# Patient Record
Sex: MALE
[De-identification: names, ages, dates, MRNs, and addresses within clinical notes are randomized per-mention and may not be internally consistent; named-entity substitution may affect disease eponyms.]

---

## 2018-02-22 ENCOUNTER — HOSPITAL ENCOUNTER (OUTPATIENT)
Dept: HOSPITAL 90 - WHH | Age: 58
Discharge: HOME | End: 2018-02-22
Attending: FAMILY MEDICINE
Payer: COMMERCIAL

## 2018-02-22 VITALS — DIASTOLIC BLOOD PRESSURE: 95 MMHG | SYSTOLIC BLOOD PRESSURE: 152 MMHG

## 2018-02-22 DIAGNOSIS — E11.621: Primary | ICD-10-CM

## 2018-02-22 DIAGNOSIS — L97.521: ICD-10-CM

## 2018-02-22 DIAGNOSIS — E11.51: ICD-10-CM

## 2018-02-22 DIAGNOSIS — E78.2: ICD-10-CM

## 2018-02-22 PROCEDURE — 99211 OFF/OP EST MAY X REQ PHY/QHP: CPT

## 2018-02-22 PROCEDURE — 93923 UPR/LXTR ART STDY 3+ LVLS: CPT

## 2018-02-26 ENCOUNTER — HOSPITAL ENCOUNTER (OUTPATIENT)
Dept: HOSPITAL 90 - WHH | Age: 58
Discharge: HOME | End: 2018-02-26
Attending: FAMILY MEDICINE
Payer: COMMERCIAL

## 2018-02-26 ENCOUNTER — HOSPITAL ENCOUNTER (OUTPATIENT)
Dept: HOSPITAL 90 - RAH | Age: 58
Discharge: HOME | End: 2018-02-26
Attending: FAMILY MEDICINE
Payer: COMMERCIAL

## 2018-02-26 VITALS — SYSTOLIC BLOOD PRESSURE: 161 MMHG | DIASTOLIC BLOOD PRESSURE: 105 MMHG

## 2018-02-26 DIAGNOSIS — L97.529: ICD-10-CM

## 2018-02-26 DIAGNOSIS — I73.9: ICD-10-CM

## 2018-02-26 DIAGNOSIS — E11.51: ICD-10-CM

## 2018-02-26 DIAGNOSIS — E11.621: Primary | ICD-10-CM

## 2018-02-26 DIAGNOSIS — L97.521: ICD-10-CM

## 2018-02-26 DIAGNOSIS — E78.2: ICD-10-CM

## 2018-02-26 PROCEDURE — 99211 OFF/OP EST MAY X REQ PHY/QHP: CPT

## 2018-02-26 PROCEDURE — 78315 BONE IMAGING 3 PHASE: CPT

## 2018-02-26 PROCEDURE — 93922 UPR/L XTREMITY ART 2 LEVELS: CPT

## 2018-02-27 ENCOUNTER — HOSPITAL ENCOUNTER (OUTPATIENT)
Dept: HOSPITAL 90 - WHH | Age: 58
Discharge: HOME | End: 2018-02-27
Attending: FAMILY MEDICINE
Payer: COMMERCIAL

## 2018-02-27 ENCOUNTER — HOSPITAL ENCOUNTER (OUTPATIENT)
Dept: HOSPITAL 90 - RAH | Age: 58
Discharge: HOME | End: 2018-02-27
Attending: FAMILY MEDICINE
Payer: COMMERCIAL

## 2018-02-27 VITALS — SYSTOLIC BLOOD PRESSURE: 156 MMHG | DIASTOLIC BLOOD PRESSURE: 80 MMHG

## 2018-02-27 DIAGNOSIS — Z86.73: ICD-10-CM

## 2018-02-27 DIAGNOSIS — I10: ICD-10-CM

## 2018-02-27 DIAGNOSIS — Z95.1: ICD-10-CM

## 2018-02-27 DIAGNOSIS — L97.529: ICD-10-CM

## 2018-02-27 DIAGNOSIS — E11.69: ICD-10-CM

## 2018-02-27 DIAGNOSIS — E78.2: ICD-10-CM

## 2018-02-27 DIAGNOSIS — Z85.89: ICD-10-CM

## 2018-02-27 DIAGNOSIS — E11.621: Primary | ICD-10-CM

## 2018-02-27 DIAGNOSIS — I25.10: ICD-10-CM

## 2018-02-27 DIAGNOSIS — M79.89: ICD-10-CM

## 2018-02-27 DIAGNOSIS — F41.9: ICD-10-CM

## 2018-02-27 DIAGNOSIS — E11.40: ICD-10-CM

## 2018-02-27 DIAGNOSIS — E11.51: ICD-10-CM

## 2018-02-27 DIAGNOSIS — M86.8X7: ICD-10-CM

## 2018-02-27 PROCEDURE — 11042 DBRDMT SUBQ TIS 1ST 20SQCM/<: CPT

## 2018-02-27 PROCEDURE — 73630 X-RAY EXAM OF FOOT: CPT

## 2018-03-01 ENCOUNTER — HOSPITAL ENCOUNTER (OUTPATIENT)
Dept: HOSPITAL 90 - WHH | Age: 58
Discharge: HOME | End: 2018-03-01
Attending: FAMILY MEDICINE
Payer: COMMERCIAL

## 2018-03-01 DIAGNOSIS — E11.621: Primary | ICD-10-CM

## 2018-03-01 DIAGNOSIS — Z85.89: ICD-10-CM

## 2018-03-01 DIAGNOSIS — L97.529: ICD-10-CM

## 2018-03-01 DIAGNOSIS — I25.10: ICD-10-CM

## 2018-03-01 DIAGNOSIS — I10: ICD-10-CM

## 2018-03-01 DIAGNOSIS — E11.40: ICD-10-CM

## 2018-03-01 DIAGNOSIS — M79.89: ICD-10-CM

## 2018-03-01 DIAGNOSIS — E78.2: ICD-10-CM

## 2018-03-01 DIAGNOSIS — Z95.1: ICD-10-CM

## 2018-03-01 DIAGNOSIS — F41.9: ICD-10-CM

## 2018-03-01 DIAGNOSIS — M86.8X7: ICD-10-CM

## 2018-03-01 DIAGNOSIS — E11.51: ICD-10-CM

## 2018-03-01 DIAGNOSIS — Z86.73: ICD-10-CM

## 2018-03-01 DIAGNOSIS — E11.69: ICD-10-CM

## 2018-03-01 LAB
BUN SERPL-MCNC: 22 MG/DL (ref 7–18)
CHLORIDE SERPL-SCNC: 103 MMOL/L (ref 101–111)
CO2 SERPL-SCNC: 31 MMOL/L (ref 21–32)
CREAT SERPL-MCNC: 1.1 MG/DL (ref 0.5–1.5)
GFR SERPL CREATININE-BSD FRML MDRD: 74 ML/MIN (ref 60–?)
GLUCOSE SERPL-MCNC: 265 MG/DL (ref 70–105)
POTASSIUM SERPL-SCNC: 5 MMOL/L (ref 3.5–5.1)
SODIUM SERPL-SCNC: 140 MMOL/L (ref 136–145)

## 2018-03-01 PROCEDURE — 80048 BASIC METABOLIC PNL TOTAL CA: CPT

## 2018-03-01 PROCEDURE — 99211 OFF/OP EST MAY X REQ PHY/QHP: CPT

## 2018-03-01 PROCEDURE — 36415 COLL VENOUS BLD VENIPUNCTURE: CPT

## 2018-03-01 PROCEDURE — 93923 UPR/LXTR ART STDY 3+ LVLS: CPT

## 2018-03-06 ENCOUNTER — HOSPITAL ENCOUNTER (OUTPATIENT)
Dept: HOSPITAL 90 - WHH | Age: 58
Discharge: HOME | End: 2018-03-06
Attending: FAMILY MEDICINE
Payer: COMMERCIAL

## 2018-03-06 VITALS — DIASTOLIC BLOOD PRESSURE: 89 MMHG | SYSTOLIC BLOOD PRESSURE: 157 MMHG

## 2018-03-06 DIAGNOSIS — Z86.73: ICD-10-CM

## 2018-03-06 DIAGNOSIS — E11.621: Primary | ICD-10-CM

## 2018-03-06 DIAGNOSIS — E11.69: ICD-10-CM

## 2018-03-06 DIAGNOSIS — Z95.1: ICD-10-CM

## 2018-03-06 DIAGNOSIS — M79.89: ICD-10-CM

## 2018-03-06 DIAGNOSIS — Z85.89: ICD-10-CM

## 2018-03-06 DIAGNOSIS — I10: ICD-10-CM

## 2018-03-06 DIAGNOSIS — E11.51: ICD-10-CM

## 2018-03-06 DIAGNOSIS — I25.10: ICD-10-CM

## 2018-03-06 DIAGNOSIS — E11.40: ICD-10-CM

## 2018-03-06 DIAGNOSIS — L97.521: ICD-10-CM

## 2018-03-06 DIAGNOSIS — E78.2: ICD-10-CM

## 2018-03-06 DIAGNOSIS — F41.9: ICD-10-CM

## 2018-03-06 DIAGNOSIS — M86.8X7: ICD-10-CM

## 2018-03-06 LAB
BUN SERPL-MCNC: 28 MG/DL (ref 7–18)
CHLORIDE SERPL-SCNC: 103 MMOL/L (ref 101–111)
CO2 SERPL-SCNC: 29 MMOL/L (ref 21–32)
CREAT SERPL-MCNC: 1.2 MG/DL (ref 0.5–1.5)
GFR SERPL CREATININE-BSD FRML MDRD: 67 ML/MIN (ref 60–?)
GLUCOSE SERPL-MCNC: 248 MG/DL (ref 70–105)
POTASSIUM SERPL-SCNC: 4.7 MMOL/L (ref 3.5–5.1)
SODIUM SERPL-SCNC: 138 MMOL/L (ref 136–145)

## 2018-03-06 PROCEDURE — 80048 BASIC METABOLIC PNL TOTAL CA: CPT

## 2018-03-06 PROCEDURE — 11042 DBRDMT SUBQ TIS 1ST 20SQCM/<: CPT

## 2018-03-06 PROCEDURE — 36415 COLL VENOUS BLD VENIPUNCTURE: CPT

## 2018-03-06 PROCEDURE — 82948 REAGENT STRIP/BLOOD GLUCOSE: CPT

## 2018-03-12 ENCOUNTER — HOSPITAL ENCOUNTER (OUTPATIENT)
Dept: HOSPITAL 90 - WHH | Age: 58
Discharge: HOME | End: 2018-03-12
Attending: FAMILY MEDICINE
Payer: COMMERCIAL

## 2018-03-12 VITALS — SYSTOLIC BLOOD PRESSURE: 170 MMHG | DIASTOLIC BLOOD PRESSURE: 97 MMHG

## 2018-03-12 DIAGNOSIS — E11.621: Primary | ICD-10-CM

## 2018-03-12 DIAGNOSIS — L97.521: ICD-10-CM

## 2018-03-12 DIAGNOSIS — E11.51: ICD-10-CM

## 2018-03-12 DIAGNOSIS — I10: ICD-10-CM

## 2018-03-12 DIAGNOSIS — E11.40: ICD-10-CM

## 2018-03-12 DIAGNOSIS — M86.672: ICD-10-CM

## 2018-03-12 DIAGNOSIS — F41.9: ICD-10-CM

## 2018-03-12 DIAGNOSIS — Z86.73: ICD-10-CM

## 2018-03-12 DIAGNOSIS — I25.10: ICD-10-CM

## 2018-03-12 DIAGNOSIS — E78.2: ICD-10-CM

## 2018-03-12 DIAGNOSIS — Z95.1: ICD-10-CM

## 2018-03-12 DIAGNOSIS — E11.69: ICD-10-CM

## 2018-03-12 PROCEDURE — 82948 REAGENT STRIP/BLOOD GLUCOSE: CPT

## 2018-03-13 ENCOUNTER — HOSPITAL ENCOUNTER (OUTPATIENT)
Dept: HOSPITAL 90 - WHH | Age: 58
Discharge: HOME | End: 2018-03-13
Attending: FAMILY MEDICINE
Payer: COMMERCIAL

## 2018-03-13 VITALS — SYSTOLIC BLOOD PRESSURE: 141 MMHG | DIASTOLIC BLOOD PRESSURE: 85 MMHG

## 2018-03-13 VITALS — SYSTOLIC BLOOD PRESSURE: 152 MMHG | DIASTOLIC BLOOD PRESSURE: 91 MMHG

## 2018-03-13 DIAGNOSIS — I10: ICD-10-CM

## 2018-03-13 DIAGNOSIS — E11.51: ICD-10-CM

## 2018-03-13 DIAGNOSIS — F41.9: ICD-10-CM

## 2018-03-13 DIAGNOSIS — Z86.73: ICD-10-CM

## 2018-03-13 DIAGNOSIS — E11.621: Primary | ICD-10-CM

## 2018-03-13 DIAGNOSIS — L97.521: ICD-10-CM

## 2018-03-13 DIAGNOSIS — Z95.1: ICD-10-CM

## 2018-03-13 DIAGNOSIS — E11.69: ICD-10-CM

## 2018-03-13 DIAGNOSIS — E11.40: ICD-10-CM

## 2018-03-13 DIAGNOSIS — I25.10: ICD-10-CM

## 2018-03-13 DIAGNOSIS — M86.672: ICD-10-CM

## 2018-03-13 DIAGNOSIS — E78.2: ICD-10-CM

## 2018-03-13 LAB
BUN SERPL-MCNC: 30 MG/DL (ref 7–18)
CHLORIDE SERPL-SCNC: 103 MMOL/L (ref 101–111)
CO2 SERPL-SCNC: 31 MMOL/L (ref 21–32)
CREAT SERPL-MCNC: 1.5 MG/DL (ref 0.5–1.5)
GFR SERPL CREATININE-BSD FRML MDRD: 51 ML/MIN (ref 60–?)
GLUCOSE SERPL-MCNC: 228 MG/DL (ref 70–105)
POTASSIUM SERPL-SCNC: 5.7 MMOL/L (ref 3.5–5.1)
SODIUM SERPL-SCNC: 140 MMOL/L (ref 136–145)

## 2018-03-13 PROCEDURE — 11042 DBRDMT SUBQ TIS 1ST 20SQCM/<: CPT

## 2018-03-13 PROCEDURE — 36415 COLL VENOUS BLD VENIPUNCTURE: CPT

## 2018-03-13 PROCEDURE — 82948 REAGENT STRIP/BLOOD GLUCOSE: CPT

## 2018-03-13 PROCEDURE — 80048 BASIC METABOLIC PNL TOTAL CA: CPT

## 2018-03-14 ENCOUNTER — HOSPITAL ENCOUNTER (OUTPATIENT)
Dept: HOSPITAL 90 - WHH | Age: 58
Discharge: HOME | End: 2018-03-14
Attending: FAMILY MEDICINE
Payer: COMMERCIAL

## 2018-03-14 VITALS — DIASTOLIC BLOOD PRESSURE: 87 MMHG | SYSTOLIC BLOOD PRESSURE: 153 MMHG

## 2018-03-14 DIAGNOSIS — M86.8X7: ICD-10-CM

## 2018-03-14 DIAGNOSIS — E11.621: Primary | ICD-10-CM

## 2018-03-14 DIAGNOSIS — E11.40: ICD-10-CM

## 2018-03-14 DIAGNOSIS — E11.51: ICD-10-CM

## 2018-03-14 DIAGNOSIS — M86.672: ICD-10-CM

## 2018-03-14 DIAGNOSIS — L97.521: ICD-10-CM

## 2018-03-14 DIAGNOSIS — Z95.1: ICD-10-CM

## 2018-03-14 DIAGNOSIS — I25.10: ICD-10-CM

## 2018-03-14 DIAGNOSIS — F41.9: ICD-10-CM

## 2018-03-14 DIAGNOSIS — E11.69: ICD-10-CM

## 2018-03-14 DIAGNOSIS — E78.2: ICD-10-CM

## 2018-03-14 DIAGNOSIS — Z86.73: ICD-10-CM

## 2018-03-14 PROCEDURE — 82948 REAGENT STRIP/BLOOD GLUCOSE: CPT

## 2018-03-15 ENCOUNTER — HOSPITAL ENCOUNTER (OUTPATIENT)
Dept: HOSPITAL 90 - WHH | Age: 58
Discharge: HOME | End: 2018-03-15
Attending: FAMILY MEDICINE
Payer: COMMERCIAL

## 2018-03-15 VITALS — DIASTOLIC BLOOD PRESSURE: 77 MMHG | SYSTOLIC BLOOD PRESSURE: 132 MMHG

## 2018-03-15 VITALS — SYSTOLIC BLOOD PRESSURE: 137 MMHG | DIASTOLIC BLOOD PRESSURE: 77 MMHG

## 2018-03-15 DIAGNOSIS — Z95.1: ICD-10-CM

## 2018-03-15 DIAGNOSIS — E11.621: Primary | ICD-10-CM

## 2018-03-15 DIAGNOSIS — E11.40: ICD-10-CM

## 2018-03-15 DIAGNOSIS — Z86.73: ICD-10-CM

## 2018-03-15 DIAGNOSIS — L97.521: ICD-10-CM

## 2018-03-15 DIAGNOSIS — E78.2: ICD-10-CM

## 2018-03-15 DIAGNOSIS — F41.9: ICD-10-CM

## 2018-03-15 DIAGNOSIS — E11.69: ICD-10-CM

## 2018-03-15 DIAGNOSIS — I25.10: ICD-10-CM

## 2018-03-15 DIAGNOSIS — M86.672: ICD-10-CM

## 2018-03-15 DIAGNOSIS — E11.51: ICD-10-CM

## 2018-03-15 DIAGNOSIS — I10: ICD-10-CM

## 2018-03-15 PROCEDURE — 82948 REAGENT STRIP/BLOOD GLUCOSE: CPT

## 2018-03-20 ENCOUNTER — HOSPITAL ENCOUNTER (OUTPATIENT)
Dept: HOSPITAL 90 - WHH | Age: 58
Discharge: HOME | End: 2018-03-20
Attending: FAMILY MEDICINE
Payer: COMMERCIAL

## 2018-03-20 VITALS — SYSTOLIC BLOOD PRESSURE: 136 MMHG | DIASTOLIC BLOOD PRESSURE: 77 MMHG

## 2018-03-20 VITALS — SYSTOLIC BLOOD PRESSURE: 90 MMHG | DIASTOLIC BLOOD PRESSURE: 52 MMHG

## 2018-03-20 DIAGNOSIS — I25.10: ICD-10-CM

## 2018-03-20 DIAGNOSIS — F41.9: ICD-10-CM

## 2018-03-20 DIAGNOSIS — E11.69: ICD-10-CM

## 2018-03-20 DIAGNOSIS — I10: ICD-10-CM

## 2018-03-20 DIAGNOSIS — Z95.1: ICD-10-CM

## 2018-03-20 DIAGNOSIS — E11.40: ICD-10-CM

## 2018-03-20 DIAGNOSIS — Z86.73: ICD-10-CM

## 2018-03-20 DIAGNOSIS — E78.2: ICD-10-CM

## 2018-03-20 DIAGNOSIS — M86.672: ICD-10-CM

## 2018-03-20 DIAGNOSIS — L97.521: ICD-10-CM

## 2018-03-20 DIAGNOSIS — E11.51: ICD-10-CM

## 2018-03-20 DIAGNOSIS — E11.621: Primary | ICD-10-CM

## 2018-03-20 LAB
BUN SERPL-MCNC: 27 MG/DL (ref 7–18)
CHLORIDE SERPL-SCNC: 103 MMOL/L (ref 101–111)
CO2 SERPL-SCNC: 31 MMOL/L (ref 21–32)
CREAT SERPL-MCNC: 1.8 MG/DL (ref 0.5–1.5)
GFR SERPL CREATININE-BSD FRML MDRD: 42 ML/MIN (ref 60–?)
GLUCOSE SERPL-MCNC: 152 MG/DL (ref 70–105)
POTASSIUM SERPL-SCNC: 4.3 MMOL/L (ref 3.5–5.1)
SODIUM SERPL-SCNC: 138 MMOL/L (ref 136–145)

## 2018-03-20 PROCEDURE — 36415 COLL VENOUS BLD VENIPUNCTURE: CPT

## 2018-03-20 PROCEDURE — 11042 DBRDMT SUBQ TIS 1ST 20SQCM/<: CPT

## 2018-03-20 PROCEDURE — 80048 BASIC METABOLIC PNL TOTAL CA: CPT

## 2018-03-20 PROCEDURE — 82948 REAGENT STRIP/BLOOD GLUCOSE: CPT

## 2018-03-21 ENCOUNTER — HOSPITAL ENCOUNTER (OUTPATIENT)
Dept: HOSPITAL 90 - WHH | Age: 58
Discharge: HOME | End: 2018-03-21
Attending: FAMILY MEDICINE
Payer: COMMERCIAL

## 2018-03-21 VITALS — DIASTOLIC BLOOD PRESSURE: 88 MMHG | SYSTOLIC BLOOD PRESSURE: 140 MMHG

## 2018-03-21 VITALS — SYSTOLIC BLOOD PRESSURE: 141 MMHG | DIASTOLIC BLOOD PRESSURE: 84 MMHG

## 2018-03-21 DIAGNOSIS — Z86.73: ICD-10-CM

## 2018-03-21 DIAGNOSIS — E78.2: ICD-10-CM

## 2018-03-21 DIAGNOSIS — Z85.89: ICD-10-CM

## 2018-03-21 DIAGNOSIS — Z95.1: ICD-10-CM

## 2018-03-21 DIAGNOSIS — E11.51: ICD-10-CM

## 2018-03-21 DIAGNOSIS — F41.9: ICD-10-CM

## 2018-03-21 DIAGNOSIS — E11.40: ICD-10-CM

## 2018-03-21 DIAGNOSIS — L97.521: ICD-10-CM

## 2018-03-21 DIAGNOSIS — I25.10: ICD-10-CM

## 2018-03-21 DIAGNOSIS — E11.69: ICD-10-CM

## 2018-03-21 DIAGNOSIS — E11.621: Primary | ICD-10-CM

## 2018-03-21 DIAGNOSIS — M86.672: ICD-10-CM

## 2018-03-21 DIAGNOSIS — I10: ICD-10-CM

## 2018-03-21 PROCEDURE — 82948 REAGENT STRIP/BLOOD GLUCOSE: CPT

## 2018-03-22 ENCOUNTER — HOSPITAL ENCOUNTER (OUTPATIENT)
Dept: HOSPITAL 90 - WHH | Age: 58
Discharge: HOME | End: 2018-03-22
Attending: FAMILY MEDICINE
Payer: COMMERCIAL

## 2018-03-22 VITALS — SYSTOLIC BLOOD PRESSURE: 142 MMHG | DIASTOLIC BLOOD PRESSURE: 91 MMHG

## 2018-03-22 VITALS — DIASTOLIC BLOOD PRESSURE: 87 MMHG | SYSTOLIC BLOOD PRESSURE: 159 MMHG

## 2018-03-22 DIAGNOSIS — L97.521: ICD-10-CM

## 2018-03-22 DIAGNOSIS — Z86.73: ICD-10-CM

## 2018-03-22 DIAGNOSIS — I10: ICD-10-CM

## 2018-03-22 DIAGNOSIS — F41.9: ICD-10-CM

## 2018-03-22 DIAGNOSIS — I25.10: ICD-10-CM

## 2018-03-22 DIAGNOSIS — E11.621: Primary | ICD-10-CM

## 2018-03-22 DIAGNOSIS — Z95.1: ICD-10-CM

## 2018-03-22 DIAGNOSIS — E11.69: ICD-10-CM

## 2018-03-22 DIAGNOSIS — E11.40: ICD-10-CM

## 2018-03-22 DIAGNOSIS — E11.51: ICD-10-CM

## 2018-03-22 DIAGNOSIS — E78.2: ICD-10-CM

## 2018-03-22 DIAGNOSIS — M86.672: ICD-10-CM

## 2018-03-22 DIAGNOSIS — Z85.89: ICD-10-CM

## 2018-03-22 PROCEDURE — 82948 REAGENT STRIP/BLOOD GLUCOSE: CPT

## 2018-03-23 ENCOUNTER — HOSPITAL ENCOUNTER (OUTPATIENT)
Dept: HOSPITAL 90 - WHH | Age: 58
Discharge: HOME | End: 2018-03-23
Attending: FAMILY MEDICINE
Payer: COMMERCIAL

## 2018-03-23 VITALS — SYSTOLIC BLOOD PRESSURE: 133 MMHG | DIASTOLIC BLOOD PRESSURE: 82 MMHG

## 2018-03-23 VITALS — SYSTOLIC BLOOD PRESSURE: 156 MMHG | DIASTOLIC BLOOD PRESSURE: 92 MMHG

## 2018-03-23 DIAGNOSIS — M86.672: ICD-10-CM

## 2018-03-23 DIAGNOSIS — I10: ICD-10-CM

## 2018-03-23 DIAGNOSIS — E11.40: ICD-10-CM

## 2018-03-23 DIAGNOSIS — I25.10: ICD-10-CM

## 2018-03-23 DIAGNOSIS — Z85.89: ICD-10-CM

## 2018-03-23 DIAGNOSIS — E78.2: ICD-10-CM

## 2018-03-23 DIAGNOSIS — E11.51: ICD-10-CM

## 2018-03-23 DIAGNOSIS — F41.9: ICD-10-CM

## 2018-03-23 DIAGNOSIS — Z95.1: ICD-10-CM

## 2018-03-23 DIAGNOSIS — E11.621: Primary | ICD-10-CM

## 2018-03-23 DIAGNOSIS — L97.521: ICD-10-CM

## 2018-03-23 DIAGNOSIS — E11.69: ICD-10-CM

## 2018-03-23 DIAGNOSIS — Z86.73: ICD-10-CM

## 2018-03-23 PROCEDURE — 82948 REAGENT STRIP/BLOOD GLUCOSE: CPT

## 2018-03-26 ENCOUNTER — HOSPITAL ENCOUNTER (OUTPATIENT)
Dept: HOSPITAL 90 - WHH | Age: 58
Discharge: HOME | End: 2018-03-26
Attending: FAMILY MEDICINE
Payer: COMMERCIAL

## 2018-03-26 VITALS — DIASTOLIC BLOOD PRESSURE: 99 MMHG | SYSTOLIC BLOOD PRESSURE: 158 MMHG

## 2018-03-26 VITALS — DIASTOLIC BLOOD PRESSURE: 83 MMHG | SYSTOLIC BLOOD PRESSURE: 153 MMHG

## 2018-03-26 DIAGNOSIS — I10: ICD-10-CM

## 2018-03-26 DIAGNOSIS — Z86.73: ICD-10-CM

## 2018-03-26 DIAGNOSIS — E11.69: ICD-10-CM

## 2018-03-26 DIAGNOSIS — L97.521: ICD-10-CM

## 2018-03-26 DIAGNOSIS — I25.10: ICD-10-CM

## 2018-03-26 DIAGNOSIS — M86.672: ICD-10-CM

## 2018-03-26 DIAGNOSIS — E11.621: Primary | ICD-10-CM

## 2018-03-26 DIAGNOSIS — Z85.89: ICD-10-CM

## 2018-03-26 DIAGNOSIS — Z95.1: ICD-10-CM

## 2018-03-26 DIAGNOSIS — E11.51: ICD-10-CM

## 2018-03-26 DIAGNOSIS — E78.2: ICD-10-CM

## 2018-03-26 DIAGNOSIS — F41.9: ICD-10-CM

## 2018-03-26 DIAGNOSIS — E11.40: ICD-10-CM

## 2018-03-26 PROCEDURE — 82948 REAGENT STRIP/BLOOD GLUCOSE: CPT

## 2018-03-27 ENCOUNTER — HOSPITAL ENCOUNTER (OUTPATIENT)
Dept: HOSPITAL 90 - WHH | Age: 58
Discharge: HOME | End: 2018-03-27
Attending: FAMILY MEDICINE
Payer: COMMERCIAL

## 2018-03-27 VITALS — SYSTOLIC BLOOD PRESSURE: 142 MMHG | DIASTOLIC BLOOD PRESSURE: 84 MMHG

## 2018-03-27 VITALS — SYSTOLIC BLOOD PRESSURE: 131 MMHG | DIASTOLIC BLOOD PRESSURE: 69 MMHG

## 2018-03-27 DIAGNOSIS — I25.10: ICD-10-CM

## 2018-03-27 DIAGNOSIS — E11.40: ICD-10-CM

## 2018-03-27 DIAGNOSIS — L97.521: ICD-10-CM

## 2018-03-27 DIAGNOSIS — E11.621: Primary | ICD-10-CM

## 2018-03-27 DIAGNOSIS — Z85.89: ICD-10-CM

## 2018-03-27 DIAGNOSIS — Z86.73: ICD-10-CM

## 2018-03-27 DIAGNOSIS — E11.69: ICD-10-CM

## 2018-03-27 DIAGNOSIS — E11.51: ICD-10-CM

## 2018-03-27 DIAGNOSIS — M86.672: ICD-10-CM

## 2018-03-27 DIAGNOSIS — F41.9: ICD-10-CM

## 2018-03-27 DIAGNOSIS — Z95.1: ICD-10-CM

## 2018-03-27 DIAGNOSIS — L84: ICD-10-CM

## 2018-03-27 DIAGNOSIS — I10: ICD-10-CM

## 2018-03-27 DIAGNOSIS — E78.2: ICD-10-CM

## 2018-03-27 LAB
BUN SERPL-MCNC: 28 MG/DL (ref 7–18)
CHLORIDE SERPL-SCNC: 100 MMOL/L (ref 101–111)
CO2 SERPL-SCNC: 30 MMOL/L (ref 21–32)
CREAT SERPL-MCNC: 1.4 MG/DL (ref 0.5–1.5)
GFR SERPL CREATININE-BSD FRML MDRD: 56 ML/MIN (ref 60–?)
GLUCOSE SERPL-MCNC: 309 MG/DL (ref 70–105)
POTASSIUM SERPL-SCNC: 4.6 MMOL/L (ref 3.5–5.1)
SODIUM SERPL-SCNC: 136 MMOL/L (ref 136–145)

## 2018-03-27 PROCEDURE — 80048 BASIC METABOLIC PNL TOTAL CA: CPT

## 2018-03-27 PROCEDURE — 36415 COLL VENOUS BLD VENIPUNCTURE: CPT

## 2018-03-27 PROCEDURE — 82948 REAGENT STRIP/BLOOD GLUCOSE: CPT

## 2018-03-27 PROCEDURE — 11055 PARING/CUTG B9 HYPRKER LES 1: CPT

## 2018-03-28 ENCOUNTER — HOSPITAL ENCOUNTER (OUTPATIENT)
Dept: HOSPITAL 90 - WHH | Age: 58
Discharge: HOME | End: 2018-03-28
Attending: FAMILY MEDICINE
Payer: COMMERCIAL

## 2018-03-28 VITALS — DIASTOLIC BLOOD PRESSURE: 75 MMHG | SYSTOLIC BLOOD PRESSURE: 130 MMHG

## 2018-03-28 VITALS — DIASTOLIC BLOOD PRESSURE: 62 MMHG | SYSTOLIC BLOOD PRESSURE: 116 MMHG

## 2018-03-28 DIAGNOSIS — E11.51: ICD-10-CM

## 2018-03-28 DIAGNOSIS — F41.9: ICD-10-CM

## 2018-03-28 DIAGNOSIS — E78.2: ICD-10-CM

## 2018-03-28 DIAGNOSIS — I10: ICD-10-CM

## 2018-03-28 DIAGNOSIS — L97.521: ICD-10-CM

## 2018-03-28 DIAGNOSIS — Z95.1: ICD-10-CM

## 2018-03-28 DIAGNOSIS — Z85.89: ICD-10-CM

## 2018-03-28 DIAGNOSIS — Z86.73: ICD-10-CM

## 2018-03-28 DIAGNOSIS — E11.40: ICD-10-CM

## 2018-03-28 DIAGNOSIS — E11.69: ICD-10-CM

## 2018-03-28 DIAGNOSIS — E11.621: Primary | ICD-10-CM

## 2018-03-28 DIAGNOSIS — I25.10: ICD-10-CM

## 2018-03-28 DIAGNOSIS — M86.672: ICD-10-CM

## 2018-03-28 PROCEDURE — 82948 REAGENT STRIP/BLOOD GLUCOSE: CPT

## 2018-03-29 ENCOUNTER — HOSPITAL ENCOUNTER (OUTPATIENT)
Dept: HOSPITAL 90 - WHH | Age: 58
Discharge: HOME | End: 2018-03-29
Attending: FAMILY MEDICINE
Payer: COMMERCIAL

## 2018-03-29 VITALS — DIASTOLIC BLOOD PRESSURE: 80 MMHG | SYSTOLIC BLOOD PRESSURE: 157 MMHG

## 2018-03-29 DIAGNOSIS — E11.40: ICD-10-CM

## 2018-03-29 DIAGNOSIS — E11.621: Primary | ICD-10-CM

## 2018-03-29 DIAGNOSIS — Z85.89: ICD-10-CM

## 2018-03-29 DIAGNOSIS — I10: ICD-10-CM

## 2018-03-29 DIAGNOSIS — I25.10: ICD-10-CM

## 2018-03-29 DIAGNOSIS — Z95.1: ICD-10-CM

## 2018-03-29 DIAGNOSIS — L97.521: ICD-10-CM

## 2018-03-29 DIAGNOSIS — E11.69: ICD-10-CM

## 2018-03-29 DIAGNOSIS — E11.51: ICD-10-CM

## 2018-03-29 DIAGNOSIS — Z86.73: ICD-10-CM

## 2018-03-29 DIAGNOSIS — M86.672: ICD-10-CM

## 2018-03-29 DIAGNOSIS — E78.2: ICD-10-CM

## 2018-03-29 DIAGNOSIS — F41.9: ICD-10-CM

## 2018-03-29 PROCEDURE — 82948 REAGENT STRIP/BLOOD GLUCOSE: CPT

## 2018-04-04 ENCOUNTER — HOSPITAL ENCOUNTER (OUTPATIENT)
Dept: HOSPITAL 90 - WHH | Age: 58
Discharge: HOME | End: 2018-04-04
Attending: FAMILY MEDICINE
Payer: COMMERCIAL

## 2018-04-04 VITALS — DIASTOLIC BLOOD PRESSURE: 78 MMHG | SYSTOLIC BLOOD PRESSURE: 137 MMHG

## 2018-04-04 VITALS — SYSTOLIC BLOOD PRESSURE: 136 MMHG | DIASTOLIC BLOOD PRESSURE: 68 MMHG

## 2018-04-04 DIAGNOSIS — I25.10: ICD-10-CM

## 2018-04-04 DIAGNOSIS — Z95.1: ICD-10-CM

## 2018-04-04 DIAGNOSIS — E11.69: ICD-10-CM

## 2018-04-04 DIAGNOSIS — F41.9: ICD-10-CM

## 2018-04-04 DIAGNOSIS — M86.672: ICD-10-CM

## 2018-04-04 DIAGNOSIS — E11.621: Primary | ICD-10-CM

## 2018-04-04 DIAGNOSIS — E78.2: ICD-10-CM

## 2018-04-04 DIAGNOSIS — E11.40: ICD-10-CM

## 2018-04-04 DIAGNOSIS — L97.521: ICD-10-CM

## 2018-04-04 DIAGNOSIS — Z86.73: ICD-10-CM

## 2018-04-04 DIAGNOSIS — I10: ICD-10-CM

## 2018-04-04 DIAGNOSIS — E11.51: ICD-10-CM

## 2018-04-04 PROCEDURE — 82948 REAGENT STRIP/BLOOD GLUCOSE: CPT

## 2018-04-05 ENCOUNTER — HOSPITAL ENCOUNTER (OUTPATIENT)
Dept: HOSPITAL 90 - WHH | Age: 58
Discharge: HOME | End: 2018-04-05
Attending: FAMILY MEDICINE
Payer: COMMERCIAL

## 2018-04-05 VITALS — SYSTOLIC BLOOD PRESSURE: 122 MMHG | DIASTOLIC BLOOD PRESSURE: 68 MMHG

## 2018-04-05 VITALS — SYSTOLIC BLOOD PRESSURE: 117 MMHG | DIASTOLIC BLOOD PRESSURE: 78 MMHG

## 2018-04-05 DIAGNOSIS — E78.2: ICD-10-CM

## 2018-04-05 DIAGNOSIS — E11.51: ICD-10-CM

## 2018-04-05 DIAGNOSIS — Z95.1: ICD-10-CM

## 2018-04-05 DIAGNOSIS — Z86.73: ICD-10-CM

## 2018-04-05 DIAGNOSIS — E11.69: ICD-10-CM

## 2018-04-05 DIAGNOSIS — E11.40: ICD-10-CM

## 2018-04-05 DIAGNOSIS — I10: ICD-10-CM

## 2018-04-05 DIAGNOSIS — L97.521: ICD-10-CM

## 2018-04-05 DIAGNOSIS — I25.10: ICD-10-CM

## 2018-04-05 DIAGNOSIS — F41.9: ICD-10-CM

## 2018-04-05 DIAGNOSIS — M86.672: ICD-10-CM

## 2018-04-05 DIAGNOSIS — E11.621: Primary | ICD-10-CM

## 2018-04-05 PROCEDURE — 82948 REAGENT STRIP/BLOOD GLUCOSE: CPT

## 2018-04-06 ENCOUNTER — HOSPITAL ENCOUNTER (OUTPATIENT)
Dept: HOSPITAL 90 - WHH | Age: 58
Discharge: HOME | End: 2018-04-06
Attending: FAMILY MEDICINE
Payer: COMMERCIAL

## 2018-04-06 VITALS — DIASTOLIC BLOOD PRESSURE: 69 MMHG | SYSTOLIC BLOOD PRESSURE: 137 MMHG

## 2018-04-06 VITALS — DIASTOLIC BLOOD PRESSURE: 86 MMHG | SYSTOLIC BLOOD PRESSURE: 163 MMHG

## 2018-04-06 DIAGNOSIS — E78.2: ICD-10-CM

## 2018-04-06 DIAGNOSIS — E11.51: ICD-10-CM

## 2018-04-06 DIAGNOSIS — Z86.73: ICD-10-CM

## 2018-04-06 DIAGNOSIS — M86.672: ICD-10-CM

## 2018-04-06 DIAGNOSIS — E11.621: Primary | ICD-10-CM

## 2018-04-06 DIAGNOSIS — E11.40: ICD-10-CM

## 2018-04-06 DIAGNOSIS — L84: ICD-10-CM

## 2018-04-06 DIAGNOSIS — I10: ICD-10-CM

## 2018-04-06 DIAGNOSIS — I25.10: ICD-10-CM

## 2018-04-06 DIAGNOSIS — Z85.89: ICD-10-CM

## 2018-04-06 DIAGNOSIS — E11.69: ICD-10-CM

## 2018-04-06 DIAGNOSIS — F41.9: ICD-10-CM

## 2018-04-06 DIAGNOSIS — Z95.1: ICD-10-CM

## 2018-04-06 DIAGNOSIS — L97.521: ICD-10-CM

## 2018-04-06 PROCEDURE — 82948 REAGENT STRIP/BLOOD GLUCOSE: CPT

## 2018-04-09 ENCOUNTER — HOSPITAL ENCOUNTER (OUTPATIENT)
Dept: HOSPITAL 90 - WHH | Age: 58
Discharge: HOME | End: 2018-04-09
Attending: FAMILY MEDICINE
Payer: COMMERCIAL

## 2018-04-09 VITALS — SYSTOLIC BLOOD PRESSURE: 112 MMHG | DIASTOLIC BLOOD PRESSURE: 67 MMHG

## 2018-04-09 VITALS — DIASTOLIC BLOOD PRESSURE: 70 MMHG | SYSTOLIC BLOOD PRESSURE: 131 MMHG

## 2018-04-09 DIAGNOSIS — E78.2: ICD-10-CM

## 2018-04-09 DIAGNOSIS — I10: ICD-10-CM

## 2018-04-09 DIAGNOSIS — L97.521: ICD-10-CM

## 2018-04-09 DIAGNOSIS — E11.69: ICD-10-CM

## 2018-04-09 DIAGNOSIS — Z95.1: ICD-10-CM

## 2018-04-09 DIAGNOSIS — E11.621: Primary | ICD-10-CM

## 2018-04-09 DIAGNOSIS — I25.10: ICD-10-CM

## 2018-04-09 DIAGNOSIS — E11.40: ICD-10-CM

## 2018-04-09 DIAGNOSIS — Z85.89: ICD-10-CM

## 2018-04-09 DIAGNOSIS — Z86.73: ICD-10-CM

## 2018-04-09 DIAGNOSIS — F41.9: ICD-10-CM

## 2018-04-09 DIAGNOSIS — M86.672: ICD-10-CM

## 2018-04-09 DIAGNOSIS — L84: ICD-10-CM

## 2018-04-09 DIAGNOSIS — E11.51: ICD-10-CM

## 2018-04-09 PROCEDURE — 82948 REAGENT STRIP/BLOOD GLUCOSE: CPT

## 2018-04-10 ENCOUNTER — HOSPITAL ENCOUNTER (OUTPATIENT)
Dept: HOSPITAL 90 - WHH | Age: 58
Discharge: HOME | End: 2018-04-10
Attending: FAMILY MEDICINE
Payer: COMMERCIAL

## 2018-04-10 VITALS — DIASTOLIC BLOOD PRESSURE: 81 MMHG | SYSTOLIC BLOOD PRESSURE: 137 MMHG

## 2018-04-10 VITALS — DIASTOLIC BLOOD PRESSURE: 78 MMHG | SYSTOLIC BLOOD PRESSURE: 124 MMHG

## 2018-04-10 DIAGNOSIS — I10: ICD-10-CM

## 2018-04-10 DIAGNOSIS — L97.521: ICD-10-CM

## 2018-04-10 DIAGNOSIS — E11.51: ICD-10-CM

## 2018-04-10 DIAGNOSIS — E78.2: ICD-10-CM

## 2018-04-10 DIAGNOSIS — E11.621: Primary | ICD-10-CM

## 2018-04-10 DIAGNOSIS — Z85.89: ICD-10-CM

## 2018-04-10 DIAGNOSIS — I25.10: ICD-10-CM

## 2018-04-10 DIAGNOSIS — Z86.73: ICD-10-CM

## 2018-04-10 DIAGNOSIS — Z95.1: ICD-10-CM

## 2018-04-10 DIAGNOSIS — M86.672: ICD-10-CM

## 2018-04-10 DIAGNOSIS — F41.9: ICD-10-CM

## 2018-04-10 DIAGNOSIS — L84: ICD-10-CM

## 2018-04-10 DIAGNOSIS — E11.40: ICD-10-CM

## 2018-04-10 DIAGNOSIS — E11.69: ICD-10-CM

## 2018-04-10 PROCEDURE — 11042 DBRDMT SUBQ TIS 1ST 20SQCM/<: CPT

## 2018-04-10 PROCEDURE — 82948 REAGENT STRIP/BLOOD GLUCOSE: CPT

## 2018-04-11 ENCOUNTER — HOSPITAL ENCOUNTER (OUTPATIENT)
Dept: HOSPITAL 90 - WHH | Age: 58
Discharge: HOME | End: 2018-04-11
Attending: FAMILY MEDICINE
Payer: COMMERCIAL

## 2018-04-11 VITALS — DIASTOLIC BLOOD PRESSURE: 78 MMHG | SYSTOLIC BLOOD PRESSURE: 128 MMHG

## 2018-04-11 VITALS — SYSTOLIC BLOOD PRESSURE: 135 MMHG | DIASTOLIC BLOOD PRESSURE: 84 MMHG

## 2018-04-11 DIAGNOSIS — E78.2: ICD-10-CM

## 2018-04-11 DIAGNOSIS — I10: ICD-10-CM

## 2018-04-11 DIAGNOSIS — F41.9: ICD-10-CM

## 2018-04-11 DIAGNOSIS — Z86.73: ICD-10-CM

## 2018-04-11 DIAGNOSIS — Z95.1: ICD-10-CM

## 2018-04-11 DIAGNOSIS — Z85.89: ICD-10-CM

## 2018-04-11 DIAGNOSIS — I25.10: ICD-10-CM

## 2018-04-11 DIAGNOSIS — E11.40: ICD-10-CM

## 2018-04-11 DIAGNOSIS — L97.521: ICD-10-CM

## 2018-04-11 DIAGNOSIS — L84: ICD-10-CM

## 2018-04-11 DIAGNOSIS — M86.672: ICD-10-CM

## 2018-04-11 DIAGNOSIS — E11.621: Primary | ICD-10-CM

## 2018-04-11 DIAGNOSIS — E11.51: ICD-10-CM

## 2018-04-11 DIAGNOSIS — E11.69: ICD-10-CM

## 2018-04-11 PROCEDURE — 82948 REAGENT STRIP/BLOOD GLUCOSE: CPT

## 2018-04-12 ENCOUNTER — HOSPITAL ENCOUNTER (OUTPATIENT)
Dept: HOSPITAL 90 - WHH | Age: 58
Discharge: HOME | End: 2018-04-12
Attending: FAMILY MEDICINE
Payer: COMMERCIAL

## 2018-04-12 VITALS — SYSTOLIC BLOOD PRESSURE: 138 MMHG | DIASTOLIC BLOOD PRESSURE: 85 MMHG

## 2018-04-12 VITALS — SYSTOLIC BLOOD PRESSURE: 139 MMHG | DIASTOLIC BLOOD PRESSURE: 91 MMHG

## 2018-04-12 DIAGNOSIS — E78.2: ICD-10-CM

## 2018-04-12 DIAGNOSIS — M86.672: ICD-10-CM

## 2018-04-12 DIAGNOSIS — I10: ICD-10-CM

## 2018-04-12 DIAGNOSIS — I25.10: ICD-10-CM

## 2018-04-12 DIAGNOSIS — Z95.1: ICD-10-CM

## 2018-04-12 DIAGNOSIS — E11.40: ICD-10-CM

## 2018-04-12 DIAGNOSIS — E11.69: ICD-10-CM

## 2018-04-12 DIAGNOSIS — L97.521: ICD-10-CM

## 2018-04-12 DIAGNOSIS — E11.51: ICD-10-CM

## 2018-04-12 DIAGNOSIS — Z86.73: ICD-10-CM

## 2018-04-12 DIAGNOSIS — E11.621: Primary | ICD-10-CM

## 2018-04-12 DIAGNOSIS — F41.9: ICD-10-CM

## 2018-04-12 PROCEDURE — 82948 REAGENT STRIP/BLOOD GLUCOSE: CPT

## 2018-04-16 ENCOUNTER — HOSPITAL ENCOUNTER (OUTPATIENT)
Dept: HOSPITAL 90 - WHH | Age: 58
Discharge: HOME | End: 2018-04-16
Attending: FAMILY MEDICINE
Payer: COMMERCIAL

## 2018-04-16 VITALS — DIASTOLIC BLOOD PRESSURE: 80 MMHG | SYSTOLIC BLOOD PRESSURE: 141 MMHG

## 2018-04-16 VITALS — SYSTOLIC BLOOD PRESSURE: 133 MMHG | DIASTOLIC BLOOD PRESSURE: 73 MMHG

## 2018-04-16 DIAGNOSIS — L97.521: ICD-10-CM

## 2018-04-16 DIAGNOSIS — Z95.1: ICD-10-CM

## 2018-04-16 DIAGNOSIS — I10: ICD-10-CM

## 2018-04-16 DIAGNOSIS — E11.69: ICD-10-CM

## 2018-04-16 DIAGNOSIS — M86.672: ICD-10-CM

## 2018-04-16 DIAGNOSIS — E78.2: ICD-10-CM

## 2018-04-16 DIAGNOSIS — I25.10: ICD-10-CM

## 2018-04-16 DIAGNOSIS — E11.40: ICD-10-CM

## 2018-04-16 DIAGNOSIS — E11.621: Primary | ICD-10-CM

## 2018-04-16 DIAGNOSIS — E11.51: ICD-10-CM

## 2018-04-16 DIAGNOSIS — F41.9: ICD-10-CM

## 2018-04-16 DIAGNOSIS — Z86.73: ICD-10-CM

## 2018-04-16 DIAGNOSIS — L84: ICD-10-CM

## 2018-04-16 DIAGNOSIS — Z85.89: ICD-10-CM

## 2018-04-16 PROCEDURE — 82948 REAGENT STRIP/BLOOD GLUCOSE: CPT

## 2018-04-17 ENCOUNTER — HOSPITAL ENCOUNTER (OUTPATIENT)
Dept: HOSPITAL 90 - WHH | Age: 58
Discharge: HOME | End: 2018-04-17
Attending: FAMILY MEDICINE
Payer: COMMERCIAL

## 2018-04-17 VITALS — DIASTOLIC BLOOD PRESSURE: 75 MMHG | SYSTOLIC BLOOD PRESSURE: 131 MMHG

## 2018-04-17 VITALS — DIASTOLIC BLOOD PRESSURE: 82 MMHG | SYSTOLIC BLOOD PRESSURE: 123 MMHG

## 2018-04-17 DIAGNOSIS — F41.9: ICD-10-CM

## 2018-04-17 DIAGNOSIS — I10: ICD-10-CM

## 2018-04-17 DIAGNOSIS — E78.2: ICD-10-CM

## 2018-04-17 DIAGNOSIS — E11.40: ICD-10-CM

## 2018-04-17 DIAGNOSIS — M86.672: ICD-10-CM

## 2018-04-17 DIAGNOSIS — L84: ICD-10-CM

## 2018-04-17 DIAGNOSIS — I25.10: ICD-10-CM

## 2018-04-17 DIAGNOSIS — E11.51: ICD-10-CM

## 2018-04-17 DIAGNOSIS — Z86.73: ICD-10-CM

## 2018-04-17 DIAGNOSIS — Z95.1: ICD-10-CM

## 2018-04-17 DIAGNOSIS — L97.521: ICD-10-CM

## 2018-04-17 DIAGNOSIS — Z85.89: ICD-10-CM

## 2018-04-17 DIAGNOSIS — E11.621: Primary | ICD-10-CM

## 2018-04-17 DIAGNOSIS — E11.69: ICD-10-CM

## 2018-04-17 PROCEDURE — 82948 REAGENT STRIP/BLOOD GLUCOSE: CPT

## 2018-04-18 ENCOUNTER — HOSPITAL ENCOUNTER (OUTPATIENT)
Dept: HOSPITAL 90 - WHH | Age: 58
Discharge: HOME | End: 2018-04-18
Attending: FAMILY MEDICINE
Payer: COMMERCIAL

## 2018-04-18 VITALS — SYSTOLIC BLOOD PRESSURE: 135 MMHG | DIASTOLIC BLOOD PRESSURE: 78 MMHG

## 2018-04-18 VITALS — SYSTOLIC BLOOD PRESSURE: 162 MMHG | DIASTOLIC BLOOD PRESSURE: 83 MMHG

## 2018-04-18 DIAGNOSIS — L97.521: ICD-10-CM

## 2018-04-18 DIAGNOSIS — I10: ICD-10-CM

## 2018-04-18 DIAGNOSIS — E11.621: Primary | ICD-10-CM

## 2018-04-18 DIAGNOSIS — E11.40: ICD-10-CM

## 2018-04-18 DIAGNOSIS — L84: ICD-10-CM

## 2018-04-18 DIAGNOSIS — M86.672: ICD-10-CM

## 2018-04-18 DIAGNOSIS — Z95.1: ICD-10-CM

## 2018-04-18 DIAGNOSIS — E11.69: ICD-10-CM

## 2018-04-18 DIAGNOSIS — F41.9: ICD-10-CM

## 2018-04-18 DIAGNOSIS — E11.51: ICD-10-CM

## 2018-04-18 DIAGNOSIS — I25.10: ICD-10-CM

## 2018-04-18 DIAGNOSIS — E78.2: ICD-10-CM

## 2018-04-18 DIAGNOSIS — Z86.73: ICD-10-CM

## 2018-04-18 DIAGNOSIS — Z85.89: ICD-10-CM

## 2018-04-18 PROCEDURE — 82948 REAGENT STRIP/BLOOD GLUCOSE: CPT

## 2018-04-19 ENCOUNTER — HOSPITAL ENCOUNTER (OUTPATIENT)
Dept: HOSPITAL 90 - WHH | Age: 58
Discharge: HOME | End: 2018-04-19
Attending: FAMILY MEDICINE
Payer: COMMERCIAL

## 2018-04-19 VITALS — SYSTOLIC BLOOD PRESSURE: 130 MMHG | DIASTOLIC BLOOD PRESSURE: 77 MMHG

## 2018-04-19 VITALS — SYSTOLIC BLOOD PRESSURE: 156 MMHG | DIASTOLIC BLOOD PRESSURE: 82 MMHG

## 2018-04-19 DIAGNOSIS — E11.621: Primary | ICD-10-CM

## 2018-04-19 DIAGNOSIS — E11.51: ICD-10-CM

## 2018-04-19 DIAGNOSIS — E11.40: ICD-10-CM

## 2018-04-19 DIAGNOSIS — E78.2: ICD-10-CM

## 2018-04-19 DIAGNOSIS — M86.672: ICD-10-CM

## 2018-04-19 DIAGNOSIS — L97.521: ICD-10-CM

## 2018-04-19 DIAGNOSIS — Z85.89: ICD-10-CM

## 2018-04-19 DIAGNOSIS — F41.9: ICD-10-CM

## 2018-04-19 DIAGNOSIS — Z86.73: ICD-10-CM

## 2018-04-19 DIAGNOSIS — Z95.1: ICD-10-CM

## 2018-04-19 DIAGNOSIS — I10: ICD-10-CM

## 2018-04-19 DIAGNOSIS — L84: ICD-10-CM

## 2018-04-19 DIAGNOSIS — I25.10: ICD-10-CM

## 2018-04-19 DIAGNOSIS — E11.69: ICD-10-CM

## 2018-04-19 PROCEDURE — 82948 REAGENT STRIP/BLOOD GLUCOSE: CPT

## 2018-04-20 ENCOUNTER — HOSPITAL ENCOUNTER (OUTPATIENT)
Dept: HOSPITAL 90 - WHH | Age: 58
Discharge: HOME | End: 2018-04-20
Attending: FAMILY MEDICINE
Payer: COMMERCIAL

## 2018-04-20 VITALS — SYSTOLIC BLOOD PRESSURE: 142 MMHG | DIASTOLIC BLOOD PRESSURE: 74 MMHG

## 2018-04-20 VITALS — SYSTOLIC BLOOD PRESSURE: 154 MMHG | DIASTOLIC BLOOD PRESSURE: 94 MMHG

## 2018-04-20 DIAGNOSIS — F41.9: ICD-10-CM

## 2018-04-20 DIAGNOSIS — E11.621: Primary | ICD-10-CM

## 2018-04-20 DIAGNOSIS — M86.672: ICD-10-CM

## 2018-04-20 DIAGNOSIS — E11.51: ICD-10-CM

## 2018-04-20 DIAGNOSIS — Z95.1: ICD-10-CM

## 2018-04-20 DIAGNOSIS — E78.2: ICD-10-CM

## 2018-04-20 DIAGNOSIS — I25.10: ICD-10-CM

## 2018-04-20 DIAGNOSIS — E11.40: ICD-10-CM

## 2018-04-20 DIAGNOSIS — L84: ICD-10-CM

## 2018-04-20 DIAGNOSIS — I10: ICD-10-CM

## 2018-04-20 DIAGNOSIS — L97.521: ICD-10-CM

## 2018-04-20 DIAGNOSIS — Z85.89: ICD-10-CM

## 2018-04-20 DIAGNOSIS — Z86.73: ICD-10-CM

## 2018-04-20 PROCEDURE — 82948 REAGENT STRIP/BLOOD GLUCOSE: CPT

## 2018-04-23 ENCOUNTER — HOSPITAL ENCOUNTER (OUTPATIENT)
Dept: HOSPITAL 90 - WHH | Age: 58
Discharge: HOME | End: 2018-04-23
Attending: FAMILY MEDICINE
Payer: COMMERCIAL

## 2018-04-23 VITALS — DIASTOLIC BLOOD PRESSURE: 72 MMHG | SYSTOLIC BLOOD PRESSURE: 108 MMHG

## 2018-04-23 VITALS — SYSTOLIC BLOOD PRESSURE: 122 MMHG | DIASTOLIC BLOOD PRESSURE: 69 MMHG

## 2018-04-23 DIAGNOSIS — E11.40: ICD-10-CM

## 2018-04-23 DIAGNOSIS — E11.621: Primary | ICD-10-CM

## 2018-04-23 DIAGNOSIS — M86.672: ICD-10-CM

## 2018-04-23 DIAGNOSIS — I25.10: ICD-10-CM

## 2018-04-23 DIAGNOSIS — Z95.1: ICD-10-CM

## 2018-04-23 DIAGNOSIS — Z85.89: ICD-10-CM

## 2018-04-23 DIAGNOSIS — F41.9: ICD-10-CM

## 2018-04-23 DIAGNOSIS — I10: ICD-10-CM

## 2018-04-23 DIAGNOSIS — E11.51: ICD-10-CM

## 2018-04-23 DIAGNOSIS — L84: ICD-10-CM

## 2018-04-23 DIAGNOSIS — L97.521: ICD-10-CM

## 2018-04-23 DIAGNOSIS — Z86.73: ICD-10-CM

## 2018-04-23 DIAGNOSIS — E78.2: ICD-10-CM

## 2018-04-23 PROCEDURE — 82948 REAGENT STRIP/BLOOD GLUCOSE: CPT

## 2018-04-24 ENCOUNTER — HOSPITAL ENCOUNTER (OUTPATIENT)
Dept: HOSPITAL 90 - WHH | Age: 58
Discharge: HOME | End: 2018-04-24
Attending: FAMILY MEDICINE
Payer: COMMERCIAL

## 2018-04-24 VITALS — SYSTOLIC BLOOD PRESSURE: 140 MMHG | DIASTOLIC BLOOD PRESSURE: 91 MMHG

## 2018-04-24 DIAGNOSIS — M86.672: ICD-10-CM

## 2018-04-24 DIAGNOSIS — E11.40: ICD-10-CM

## 2018-04-24 DIAGNOSIS — E11.621: Primary | ICD-10-CM

## 2018-04-24 DIAGNOSIS — F41.9: ICD-10-CM

## 2018-04-24 DIAGNOSIS — Z95.1: ICD-10-CM

## 2018-04-24 DIAGNOSIS — I25.10: ICD-10-CM

## 2018-04-24 DIAGNOSIS — L97.521: ICD-10-CM

## 2018-04-24 DIAGNOSIS — Z86.73: ICD-10-CM

## 2018-04-24 DIAGNOSIS — I10: ICD-10-CM

## 2018-04-24 DIAGNOSIS — E11.69: ICD-10-CM

## 2018-04-24 DIAGNOSIS — E11.51: ICD-10-CM

## 2018-04-24 DIAGNOSIS — E78.2: ICD-10-CM

## 2018-04-24 LAB
BUN SERPL-MCNC: 24 MG/DL (ref 7–18)
CHLORIDE SERPL-SCNC: 102 MMOL/L (ref 101–111)
CO2 SERPL-SCNC: 29 MMOL/L (ref 21–32)
CREAT SERPL-MCNC: 1.2 MG/DL (ref 0.5–1.5)
CRP SERPL HS-MCNC: < 2 MG/L (ref 0–9)
GFR SERPL CREATININE-BSD FRML MDRD: 67 ML/MIN (ref 60–?)
GLUCOSE SERPL-MCNC: 278 MG/DL (ref 70–105)
POTASSIUM SERPL-SCNC: 4.8 MMOL/L (ref 3.5–5.1)
SODIUM SERPL-SCNC: 137 MMOL/L (ref 136–145)

## 2018-04-24 PROCEDURE — 82948 REAGENT STRIP/BLOOD GLUCOSE: CPT

## 2018-04-24 PROCEDURE — 36415 COLL VENOUS BLD VENIPUNCTURE: CPT

## 2018-04-24 PROCEDURE — 80048 BASIC METABOLIC PNL TOTAL CA: CPT

## 2018-04-24 PROCEDURE — 85651 RBC SED RATE NONAUTOMATED: CPT

## 2018-04-24 PROCEDURE — 86140 C-REACTIVE PROTEIN: CPT

## 2018-04-25 ENCOUNTER — HOSPITAL ENCOUNTER (OUTPATIENT)
Dept: HOSPITAL 90 - WHH | Age: 58
Discharge: HOME | End: 2018-04-25
Attending: FAMILY MEDICINE
Payer: COMMERCIAL

## 2018-04-25 VITALS — DIASTOLIC BLOOD PRESSURE: 82 MMHG | SYSTOLIC BLOOD PRESSURE: 148 MMHG

## 2018-04-25 VITALS — SYSTOLIC BLOOD PRESSURE: 145 MMHG | DIASTOLIC BLOOD PRESSURE: 85 MMHG

## 2018-04-25 DIAGNOSIS — L97.521: ICD-10-CM

## 2018-04-25 DIAGNOSIS — Z95.1: ICD-10-CM

## 2018-04-25 DIAGNOSIS — E11.69: ICD-10-CM

## 2018-04-25 DIAGNOSIS — E78.2: ICD-10-CM

## 2018-04-25 DIAGNOSIS — I10: ICD-10-CM

## 2018-04-25 DIAGNOSIS — E11.51: ICD-10-CM

## 2018-04-25 DIAGNOSIS — F41.9: ICD-10-CM

## 2018-04-25 DIAGNOSIS — E11.40: ICD-10-CM

## 2018-04-25 DIAGNOSIS — M86.672: ICD-10-CM

## 2018-04-25 DIAGNOSIS — Z86.73: ICD-10-CM

## 2018-04-25 DIAGNOSIS — I25.10: ICD-10-CM

## 2018-04-25 DIAGNOSIS — E11.621: Primary | ICD-10-CM

## 2018-04-25 PROCEDURE — 82948 REAGENT STRIP/BLOOD GLUCOSE: CPT

## 2018-04-26 ENCOUNTER — HOSPITAL ENCOUNTER (OUTPATIENT)
Dept: HOSPITAL 90 - WHH | Age: 58
Discharge: HOME | End: 2018-04-26
Attending: FAMILY MEDICINE
Payer: COMMERCIAL

## 2018-04-26 VITALS — SYSTOLIC BLOOD PRESSURE: 150 MMHG | DIASTOLIC BLOOD PRESSURE: 82 MMHG

## 2018-04-26 DIAGNOSIS — E11.69: ICD-10-CM

## 2018-04-26 DIAGNOSIS — I25.10: ICD-10-CM

## 2018-04-26 DIAGNOSIS — Z86.73: ICD-10-CM

## 2018-04-26 DIAGNOSIS — E78.2: ICD-10-CM

## 2018-04-26 DIAGNOSIS — E11.621: Primary | ICD-10-CM

## 2018-04-26 DIAGNOSIS — M86.672: ICD-10-CM

## 2018-04-26 DIAGNOSIS — F41.9: ICD-10-CM

## 2018-04-26 DIAGNOSIS — E11.40: ICD-10-CM

## 2018-04-26 DIAGNOSIS — I10: ICD-10-CM

## 2018-04-26 DIAGNOSIS — L97.521: ICD-10-CM

## 2018-04-26 DIAGNOSIS — E11.51: ICD-10-CM

## 2018-04-26 DIAGNOSIS — Z95.1: ICD-10-CM

## 2018-04-26 PROCEDURE — 82948 REAGENT STRIP/BLOOD GLUCOSE: CPT

## 2018-04-27 ENCOUNTER — HOSPITAL ENCOUNTER (OUTPATIENT)
Dept: HOSPITAL 90 - WHH | Age: 58
Discharge: HOME | End: 2018-04-27
Attending: FAMILY MEDICINE
Payer: COMMERCIAL

## 2018-04-27 VITALS — DIASTOLIC BLOOD PRESSURE: 89 MMHG | SYSTOLIC BLOOD PRESSURE: 165 MMHG

## 2018-04-27 VITALS — SYSTOLIC BLOOD PRESSURE: 121 MMHG | DIASTOLIC BLOOD PRESSURE: 80 MMHG

## 2018-04-27 DIAGNOSIS — Z95.1: ICD-10-CM

## 2018-04-27 DIAGNOSIS — E11.40: ICD-10-CM

## 2018-04-27 DIAGNOSIS — E78.2: ICD-10-CM

## 2018-04-27 DIAGNOSIS — L97.521: ICD-10-CM

## 2018-04-27 DIAGNOSIS — I10: ICD-10-CM

## 2018-04-27 DIAGNOSIS — M86.672: ICD-10-CM

## 2018-04-27 DIAGNOSIS — I25.10: ICD-10-CM

## 2018-04-27 DIAGNOSIS — E11.621: Primary | ICD-10-CM

## 2018-04-27 DIAGNOSIS — E11.51: ICD-10-CM

## 2018-04-27 DIAGNOSIS — Z86.73: ICD-10-CM

## 2018-04-27 DIAGNOSIS — F41.9: ICD-10-CM

## 2018-04-27 DIAGNOSIS — E11.69: ICD-10-CM

## 2018-04-27 PROCEDURE — 82948 REAGENT STRIP/BLOOD GLUCOSE: CPT

## 2018-04-30 ENCOUNTER — HOSPITAL ENCOUNTER (OUTPATIENT)
Dept: HOSPITAL 90 - WHH | Age: 58
Discharge: HOME | End: 2018-04-30
Attending: FAMILY MEDICINE
Payer: COMMERCIAL

## 2018-04-30 VITALS — DIASTOLIC BLOOD PRESSURE: 85 MMHG | SYSTOLIC BLOOD PRESSURE: 137 MMHG

## 2018-04-30 VITALS — DIASTOLIC BLOOD PRESSURE: 76 MMHG | SYSTOLIC BLOOD PRESSURE: 149 MMHG

## 2018-04-30 DIAGNOSIS — F41.9: ICD-10-CM

## 2018-04-30 DIAGNOSIS — E78.2: ICD-10-CM

## 2018-04-30 DIAGNOSIS — M86.672: ICD-10-CM

## 2018-04-30 DIAGNOSIS — E11.69: ICD-10-CM

## 2018-04-30 DIAGNOSIS — Z95.1: ICD-10-CM

## 2018-04-30 DIAGNOSIS — E11.621: Primary | ICD-10-CM

## 2018-04-30 DIAGNOSIS — E11.40: ICD-10-CM

## 2018-04-30 DIAGNOSIS — L97.521: ICD-10-CM

## 2018-04-30 DIAGNOSIS — E11.51: ICD-10-CM

## 2018-04-30 DIAGNOSIS — I10: ICD-10-CM

## 2018-04-30 DIAGNOSIS — Z86.73: ICD-10-CM

## 2018-04-30 DIAGNOSIS — I25.10: ICD-10-CM

## 2018-04-30 PROCEDURE — 82948 REAGENT STRIP/BLOOD GLUCOSE: CPT

## 2018-05-01 ENCOUNTER — HOSPITAL ENCOUNTER (OUTPATIENT)
Dept: HOSPITAL 90 - WHH | Age: 58
Discharge: HOME | End: 2018-05-01
Attending: FAMILY MEDICINE
Payer: COMMERCIAL

## 2018-05-01 VITALS — SYSTOLIC BLOOD PRESSURE: 148 MMHG | DIASTOLIC BLOOD PRESSURE: 84 MMHG

## 2018-05-01 VITALS — SYSTOLIC BLOOD PRESSURE: 127 MMHG | DIASTOLIC BLOOD PRESSURE: 69 MMHG

## 2018-05-01 DIAGNOSIS — I25.10: ICD-10-CM

## 2018-05-01 DIAGNOSIS — M86.672: ICD-10-CM

## 2018-05-01 DIAGNOSIS — Z95.1: ICD-10-CM

## 2018-05-01 DIAGNOSIS — Z86.73: ICD-10-CM

## 2018-05-01 DIAGNOSIS — L97.521: ICD-10-CM

## 2018-05-01 DIAGNOSIS — F41.9: ICD-10-CM

## 2018-05-01 DIAGNOSIS — E78.2: ICD-10-CM

## 2018-05-01 DIAGNOSIS — E11.40: ICD-10-CM

## 2018-05-01 DIAGNOSIS — E11.69: ICD-10-CM

## 2018-05-01 DIAGNOSIS — E11.621: Primary | ICD-10-CM

## 2018-05-01 DIAGNOSIS — L84: ICD-10-CM

## 2018-05-01 DIAGNOSIS — E11.51: ICD-10-CM

## 2018-05-01 DIAGNOSIS — I10: ICD-10-CM

## 2018-05-01 PROCEDURE — 11055 PARING/CUTG B9 HYPRKER LES 1: CPT

## 2018-05-01 PROCEDURE — 82948 REAGENT STRIP/BLOOD GLUCOSE: CPT

## 2018-05-02 ENCOUNTER — HOSPITAL ENCOUNTER (OUTPATIENT)
Dept: HOSPITAL 90 - WHH | Age: 58
Discharge: HOME | End: 2018-05-02
Attending: FAMILY MEDICINE
Payer: COMMERCIAL

## 2018-05-02 VITALS — SYSTOLIC BLOOD PRESSURE: 150 MMHG | DIASTOLIC BLOOD PRESSURE: 86 MMHG

## 2018-05-02 VITALS — SYSTOLIC BLOOD PRESSURE: 143 MMHG | DIASTOLIC BLOOD PRESSURE: 88 MMHG

## 2018-05-02 DIAGNOSIS — E11.40: ICD-10-CM

## 2018-05-02 DIAGNOSIS — Z95.1: ICD-10-CM

## 2018-05-02 DIAGNOSIS — Z86.73: ICD-10-CM

## 2018-05-02 DIAGNOSIS — E11.69: ICD-10-CM

## 2018-05-02 DIAGNOSIS — E11.621: Primary | ICD-10-CM

## 2018-05-02 DIAGNOSIS — I10: ICD-10-CM

## 2018-05-02 DIAGNOSIS — E11.51: ICD-10-CM

## 2018-05-02 DIAGNOSIS — L84: ICD-10-CM

## 2018-05-02 DIAGNOSIS — F41.9: ICD-10-CM

## 2018-05-02 DIAGNOSIS — I25.10: ICD-10-CM

## 2018-05-02 DIAGNOSIS — L97.521: ICD-10-CM

## 2018-05-02 DIAGNOSIS — M86.672: ICD-10-CM

## 2018-05-02 DIAGNOSIS — E78.2: ICD-10-CM

## 2018-05-02 PROCEDURE — 82948 REAGENT STRIP/BLOOD GLUCOSE: CPT

## 2018-05-03 ENCOUNTER — HOSPITAL ENCOUNTER (OUTPATIENT)
Dept: HOSPITAL 90 - WHH | Age: 58
Discharge: HOME | End: 2018-05-03
Attending: FAMILY MEDICINE
Payer: COMMERCIAL

## 2018-05-03 VITALS — SYSTOLIC BLOOD PRESSURE: 125 MMHG | DIASTOLIC BLOOD PRESSURE: 73 MMHG

## 2018-05-03 VITALS — DIASTOLIC BLOOD PRESSURE: 69 MMHG | SYSTOLIC BLOOD PRESSURE: 122 MMHG

## 2018-05-03 DIAGNOSIS — M86.672: ICD-10-CM

## 2018-05-03 DIAGNOSIS — E11.40: ICD-10-CM

## 2018-05-03 DIAGNOSIS — E11.22: ICD-10-CM

## 2018-05-03 DIAGNOSIS — E11.621: Primary | ICD-10-CM

## 2018-05-03 DIAGNOSIS — E11.51: ICD-10-CM

## 2018-05-03 DIAGNOSIS — Z86.73: ICD-10-CM

## 2018-05-03 DIAGNOSIS — F41.9: ICD-10-CM

## 2018-05-03 DIAGNOSIS — E11.69: ICD-10-CM

## 2018-05-03 DIAGNOSIS — E78.2: ICD-10-CM

## 2018-05-03 DIAGNOSIS — Z95.1: ICD-10-CM

## 2018-05-03 DIAGNOSIS — N18.9: ICD-10-CM

## 2018-05-03 DIAGNOSIS — I25.10: ICD-10-CM

## 2018-05-03 DIAGNOSIS — I12.9: ICD-10-CM

## 2018-05-03 DIAGNOSIS — L97.521: ICD-10-CM

## 2018-05-03 PROCEDURE — 82948 REAGENT STRIP/BLOOD GLUCOSE: CPT

## 2018-05-04 ENCOUNTER — HOSPITAL ENCOUNTER (OUTPATIENT)
Dept: HOSPITAL 90 - WHH | Age: 58
Discharge: HOME | End: 2018-05-04
Attending: FAMILY MEDICINE
Payer: COMMERCIAL

## 2018-05-04 VITALS — SYSTOLIC BLOOD PRESSURE: 124 MMHG | DIASTOLIC BLOOD PRESSURE: 78 MMHG

## 2018-05-04 DIAGNOSIS — E11.51: ICD-10-CM

## 2018-05-04 DIAGNOSIS — E78.2: ICD-10-CM

## 2018-05-04 DIAGNOSIS — Z95.1: ICD-10-CM

## 2018-05-04 DIAGNOSIS — E11.621: Primary | ICD-10-CM

## 2018-05-04 DIAGNOSIS — Z85.89: ICD-10-CM

## 2018-05-04 DIAGNOSIS — Z86.73: ICD-10-CM

## 2018-05-04 DIAGNOSIS — M86.672: ICD-10-CM

## 2018-05-04 DIAGNOSIS — N18.9: ICD-10-CM

## 2018-05-04 DIAGNOSIS — I25.10: ICD-10-CM

## 2018-05-04 DIAGNOSIS — E11.40: ICD-10-CM

## 2018-05-04 DIAGNOSIS — L97.521: ICD-10-CM

## 2018-05-04 DIAGNOSIS — E11.69: ICD-10-CM

## 2018-05-04 DIAGNOSIS — E11.22: ICD-10-CM

## 2018-05-04 DIAGNOSIS — I12.9: ICD-10-CM

## 2018-05-04 DIAGNOSIS — F41.9: ICD-10-CM

## 2018-05-04 PROCEDURE — 82948 REAGENT STRIP/BLOOD GLUCOSE: CPT

## 2018-05-07 ENCOUNTER — HOSPITAL ENCOUNTER (OUTPATIENT)
Dept: HOSPITAL 90 - WHH | Age: 58
Discharge: HOME | End: 2018-05-07
Attending: FAMILY MEDICINE
Payer: COMMERCIAL

## 2018-05-07 VITALS — SYSTOLIC BLOOD PRESSURE: 153 MMHG | DIASTOLIC BLOOD PRESSURE: 84 MMHG

## 2018-05-07 VITALS — SYSTOLIC BLOOD PRESSURE: 161 MMHG | DIASTOLIC BLOOD PRESSURE: 87 MMHG

## 2018-05-07 DIAGNOSIS — Z95.1: ICD-10-CM

## 2018-05-07 DIAGNOSIS — E11.51: ICD-10-CM

## 2018-05-07 DIAGNOSIS — I10: ICD-10-CM

## 2018-05-07 DIAGNOSIS — Z86.73: ICD-10-CM

## 2018-05-07 DIAGNOSIS — M86.672: ICD-10-CM

## 2018-05-07 DIAGNOSIS — E78.2: ICD-10-CM

## 2018-05-07 DIAGNOSIS — I25.10: ICD-10-CM

## 2018-05-07 DIAGNOSIS — L97.521: ICD-10-CM

## 2018-05-07 DIAGNOSIS — E11.69: ICD-10-CM

## 2018-05-07 DIAGNOSIS — E11.621: Primary | ICD-10-CM

## 2018-05-07 DIAGNOSIS — L84: ICD-10-CM

## 2018-05-07 DIAGNOSIS — E11.40: ICD-10-CM

## 2018-05-07 DIAGNOSIS — F41.9: ICD-10-CM

## 2018-05-07 PROCEDURE — 82948 REAGENT STRIP/BLOOD GLUCOSE: CPT

## 2018-05-08 ENCOUNTER — HOSPITAL ENCOUNTER (OUTPATIENT)
Dept: HOSPITAL 90 - WHH | Age: 58
Discharge: HOME | End: 2018-05-08
Attending: FAMILY MEDICINE
Payer: COMMERCIAL

## 2018-05-08 VITALS — SYSTOLIC BLOOD PRESSURE: 150 MMHG | DIASTOLIC BLOOD PRESSURE: 81 MMHG

## 2018-05-08 VITALS — DIASTOLIC BLOOD PRESSURE: 84 MMHG | SYSTOLIC BLOOD PRESSURE: 146 MMHG

## 2018-05-08 DIAGNOSIS — E78.2: ICD-10-CM

## 2018-05-08 DIAGNOSIS — E11.22: ICD-10-CM

## 2018-05-08 DIAGNOSIS — M86.672: ICD-10-CM

## 2018-05-08 DIAGNOSIS — E11.621: Primary | ICD-10-CM

## 2018-05-08 DIAGNOSIS — I25.10: ICD-10-CM

## 2018-05-08 DIAGNOSIS — E11.40: ICD-10-CM

## 2018-05-08 DIAGNOSIS — N18.9: ICD-10-CM

## 2018-05-08 DIAGNOSIS — I12.9: ICD-10-CM

## 2018-05-08 DIAGNOSIS — E11.51: ICD-10-CM

## 2018-05-08 DIAGNOSIS — L84: ICD-10-CM

## 2018-05-08 DIAGNOSIS — Z86.73: ICD-10-CM

## 2018-05-08 DIAGNOSIS — E11.69: ICD-10-CM

## 2018-05-08 DIAGNOSIS — F41.9: ICD-10-CM

## 2018-05-08 DIAGNOSIS — L97.521: ICD-10-CM

## 2018-05-08 DIAGNOSIS — Z95.1: ICD-10-CM

## 2018-05-08 PROCEDURE — 82948 REAGENT STRIP/BLOOD GLUCOSE: CPT

## 2018-05-09 ENCOUNTER — HOSPITAL ENCOUNTER (OUTPATIENT)
Dept: HOSPITAL 90 - WHH | Age: 58
Discharge: HOME | End: 2018-05-09
Attending: FAMILY MEDICINE
Payer: COMMERCIAL

## 2018-05-09 VITALS — SYSTOLIC BLOOD PRESSURE: 141 MMHG | DIASTOLIC BLOOD PRESSURE: 78 MMHG

## 2018-05-09 VITALS — DIASTOLIC BLOOD PRESSURE: 86 MMHG | SYSTOLIC BLOOD PRESSURE: 148 MMHG

## 2018-05-09 DIAGNOSIS — I12.9: ICD-10-CM

## 2018-05-09 DIAGNOSIS — M86.672: ICD-10-CM

## 2018-05-09 DIAGNOSIS — L84: ICD-10-CM

## 2018-05-09 DIAGNOSIS — L97.521: ICD-10-CM

## 2018-05-09 DIAGNOSIS — E11.51: ICD-10-CM

## 2018-05-09 DIAGNOSIS — E11.69: ICD-10-CM

## 2018-05-09 DIAGNOSIS — E11.22: ICD-10-CM

## 2018-05-09 DIAGNOSIS — E11.40: ICD-10-CM

## 2018-05-09 DIAGNOSIS — E78.2: ICD-10-CM

## 2018-05-09 DIAGNOSIS — I25.10: ICD-10-CM

## 2018-05-09 DIAGNOSIS — E11.621: Primary | ICD-10-CM

## 2018-05-09 DIAGNOSIS — N18.9: ICD-10-CM

## 2018-05-09 DIAGNOSIS — F41.9: ICD-10-CM

## 2018-05-09 DIAGNOSIS — Z95.1: ICD-10-CM

## 2018-05-09 DIAGNOSIS — Z86.73: ICD-10-CM

## 2018-05-09 PROCEDURE — 82948 REAGENT STRIP/BLOOD GLUCOSE: CPT

## 2018-05-10 ENCOUNTER — HOSPITAL ENCOUNTER (OUTPATIENT)
Dept: HOSPITAL 90 - WHH | Age: 58
Discharge: HOME | End: 2018-05-10
Attending: FAMILY MEDICINE
Payer: COMMERCIAL

## 2018-05-10 VITALS — DIASTOLIC BLOOD PRESSURE: 69 MMHG | SYSTOLIC BLOOD PRESSURE: 122 MMHG

## 2018-05-10 VITALS — SYSTOLIC BLOOD PRESSURE: 140 MMHG | DIASTOLIC BLOOD PRESSURE: 72 MMHG

## 2018-05-10 DIAGNOSIS — I25.10: ICD-10-CM

## 2018-05-10 DIAGNOSIS — F41.9: ICD-10-CM

## 2018-05-10 DIAGNOSIS — Z86.73: ICD-10-CM

## 2018-05-10 DIAGNOSIS — Z95.1: ICD-10-CM

## 2018-05-10 DIAGNOSIS — L84: ICD-10-CM

## 2018-05-10 DIAGNOSIS — L97.521: ICD-10-CM

## 2018-05-10 DIAGNOSIS — E78.2: ICD-10-CM

## 2018-05-10 DIAGNOSIS — I12.9: ICD-10-CM

## 2018-05-10 DIAGNOSIS — E11.51: ICD-10-CM

## 2018-05-10 DIAGNOSIS — E11.621: Primary | ICD-10-CM

## 2018-05-10 DIAGNOSIS — E11.40: ICD-10-CM

## 2018-05-10 DIAGNOSIS — E11.69: ICD-10-CM

## 2018-05-10 DIAGNOSIS — N18.9: ICD-10-CM

## 2018-05-10 DIAGNOSIS — E11.22: ICD-10-CM

## 2018-05-10 DIAGNOSIS — M86.672: ICD-10-CM

## 2018-05-10 PROCEDURE — 82948 REAGENT STRIP/BLOOD GLUCOSE: CPT

## 2018-05-11 ENCOUNTER — HOSPITAL ENCOUNTER (OUTPATIENT)
Dept: HOSPITAL 90 - WHH | Age: 58
Discharge: HOME | End: 2018-05-11
Attending: FAMILY MEDICINE
Payer: COMMERCIAL

## 2018-05-11 VITALS — SYSTOLIC BLOOD PRESSURE: 141 MMHG | DIASTOLIC BLOOD PRESSURE: 88 MMHG

## 2018-05-11 VITALS — SYSTOLIC BLOOD PRESSURE: 132 MMHG | DIASTOLIC BLOOD PRESSURE: 79 MMHG

## 2018-05-11 DIAGNOSIS — F41.9: ICD-10-CM

## 2018-05-11 DIAGNOSIS — Z86.73: ICD-10-CM

## 2018-05-11 DIAGNOSIS — E78.2: ICD-10-CM

## 2018-05-11 DIAGNOSIS — E11.22: ICD-10-CM

## 2018-05-11 DIAGNOSIS — E11.621: Primary | ICD-10-CM

## 2018-05-11 DIAGNOSIS — M86.672: ICD-10-CM

## 2018-05-11 DIAGNOSIS — E11.69: ICD-10-CM

## 2018-05-11 DIAGNOSIS — E11.51: ICD-10-CM

## 2018-05-11 DIAGNOSIS — Z85.89: ICD-10-CM

## 2018-05-11 DIAGNOSIS — N18.9: ICD-10-CM

## 2018-05-11 DIAGNOSIS — Z95.1: ICD-10-CM

## 2018-05-11 DIAGNOSIS — I25.10: ICD-10-CM

## 2018-05-11 DIAGNOSIS — I12.9: ICD-10-CM

## 2018-05-11 DIAGNOSIS — L97.521: ICD-10-CM

## 2018-05-11 DIAGNOSIS — E11.40: ICD-10-CM

## 2018-05-11 PROCEDURE — 82948 REAGENT STRIP/BLOOD GLUCOSE: CPT

## 2018-05-14 ENCOUNTER — HOSPITAL ENCOUNTER (OUTPATIENT)
Dept: HOSPITAL 90 - WHH | Age: 58
Discharge: HOME | End: 2018-05-14
Attending: FAMILY MEDICINE
Payer: COMMERCIAL

## 2018-05-14 VITALS — SYSTOLIC BLOOD PRESSURE: 166 MMHG | DIASTOLIC BLOOD PRESSURE: 88 MMHG

## 2018-05-14 VITALS — DIASTOLIC BLOOD PRESSURE: 80 MMHG | SYSTOLIC BLOOD PRESSURE: 146 MMHG

## 2018-05-14 DIAGNOSIS — E11.621: Primary | ICD-10-CM

## 2018-05-14 DIAGNOSIS — E78.2: ICD-10-CM

## 2018-05-14 DIAGNOSIS — Z95.1: ICD-10-CM

## 2018-05-14 DIAGNOSIS — F41.9: ICD-10-CM

## 2018-05-14 DIAGNOSIS — I25.10: ICD-10-CM

## 2018-05-14 DIAGNOSIS — I12.9: ICD-10-CM

## 2018-05-14 DIAGNOSIS — E11.40: ICD-10-CM

## 2018-05-14 DIAGNOSIS — E11.22: ICD-10-CM

## 2018-05-14 DIAGNOSIS — E11.69: ICD-10-CM

## 2018-05-14 DIAGNOSIS — Z85.89: ICD-10-CM

## 2018-05-14 DIAGNOSIS — N18.9: ICD-10-CM

## 2018-05-14 DIAGNOSIS — Z86.73: ICD-10-CM

## 2018-05-14 DIAGNOSIS — M86.672: ICD-10-CM

## 2018-05-14 DIAGNOSIS — E11.51: ICD-10-CM

## 2018-05-14 PROCEDURE — 82948 REAGENT STRIP/BLOOD GLUCOSE: CPT

## 2018-05-15 ENCOUNTER — HOSPITAL ENCOUNTER (OUTPATIENT)
Dept: HOSPITAL 90 - WHH | Age: 58
Discharge: HOME | End: 2018-05-15
Attending: FAMILY MEDICINE
Payer: COMMERCIAL

## 2018-05-15 VITALS — SYSTOLIC BLOOD PRESSURE: 153 MMHG | DIASTOLIC BLOOD PRESSURE: 91 MMHG

## 2018-05-15 DIAGNOSIS — E11.621: Primary | ICD-10-CM

## 2018-05-15 DIAGNOSIS — N18.9: ICD-10-CM

## 2018-05-15 DIAGNOSIS — Z85.89: ICD-10-CM

## 2018-05-15 DIAGNOSIS — I12.9: ICD-10-CM

## 2018-05-15 DIAGNOSIS — F41.9: ICD-10-CM

## 2018-05-15 DIAGNOSIS — L97.521: ICD-10-CM

## 2018-05-15 DIAGNOSIS — E11.51: ICD-10-CM

## 2018-05-15 DIAGNOSIS — M86.672: ICD-10-CM

## 2018-05-15 DIAGNOSIS — Z95.1: ICD-10-CM

## 2018-05-15 DIAGNOSIS — I25.10: ICD-10-CM

## 2018-05-15 DIAGNOSIS — E11.69: ICD-10-CM

## 2018-05-15 DIAGNOSIS — E78.2: ICD-10-CM

## 2018-05-15 DIAGNOSIS — E11.40: ICD-10-CM

## 2018-05-15 DIAGNOSIS — E11.22: ICD-10-CM

## 2018-05-15 DIAGNOSIS — Z86.73: ICD-10-CM

## 2018-05-15 PROCEDURE — 99214 OFFICE O/P EST MOD 30 MIN: CPT

## 2018-05-17 ENCOUNTER — HOSPITAL ENCOUNTER (OUTPATIENT)
Dept: HOSPITAL 90 - LAB | Age: 58
Discharge: HOME | End: 2018-05-17
Attending: INTERNAL MEDICINE
Payer: COMMERCIAL

## 2018-05-17 DIAGNOSIS — E11.21: Primary | ICD-10-CM

## 2018-05-17 LAB — HBA1C MFR BLD: 8.6 % (ref 4–6)

## 2018-05-17 PROCEDURE — 83036 HEMOGLOBIN GLYCOSYLATED A1C: CPT

## 2018-05-22 ENCOUNTER — HOSPITAL ENCOUNTER (OUTPATIENT)
Dept: HOSPITAL 90 - WHH | Age: 58
Discharge: HOME | End: 2018-05-22
Attending: FAMILY MEDICINE
Payer: COMMERCIAL

## 2018-05-22 VITALS — SYSTOLIC BLOOD PRESSURE: 140 MMHG | DIASTOLIC BLOOD PRESSURE: 78 MMHG

## 2018-05-22 DIAGNOSIS — Z95.1: ICD-10-CM

## 2018-05-22 DIAGNOSIS — L97.521: ICD-10-CM

## 2018-05-22 DIAGNOSIS — E11.22: ICD-10-CM

## 2018-05-22 DIAGNOSIS — E78.2: ICD-10-CM

## 2018-05-22 DIAGNOSIS — E11.621: Primary | ICD-10-CM

## 2018-05-22 DIAGNOSIS — E11.51: ICD-10-CM

## 2018-05-22 DIAGNOSIS — E11.40: ICD-10-CM

## 2018-05-22 DIAGNOSIS — Z85.89: ICD-10-CM

## 2018-05-22 DIAGNOSIS — I12.9: ICD-10-CM

## 2018-05-22 DIAGNOSIS — M86.672: ICD-10-CM

## 2018-05-22 DIAGNOSIS — N18.9: ICD-10-CM

## 2018-05-22 DIAGNOSIS — F41.9: ICD-10-CM

## 2018-05-22 DIAGNOSIS — E11.69: ICD-10-CM

## 2018-05-22 DIAGNOSIS — Z86.73: ICD-10-CM

## 2018-05-22 DIAGNOSIS — I25.10: ICD-10-CM

## 2018-05-22 PROCEDURE — 11042 DBRDMT SUBQ TIS 1ST 20SQCM/<: CPT

## 2018-06-05 ENCOUNTER — HOSPITAL ENCOUNTER (OUTPATIENT)
Dept: HOSPITAL 90 - WHH | Age: 58
Discharge: HOME | End: 2018-06-05
Attending: FAMILY MEDICINE
Payer: COMMERCIAL

## 2018-06-05 VITALS — DIASTOLIC BLOOD PRESSURE: 87 MMHG | SYSTOLIC BLOOD PRESSURE: 138 MMHG

## 2018-06-05 DIAGNOSIS — I10: ICD-10-CM

## 2018-06-05 DIAGNOSIS — Z86.73: ICD-10-CM

## 2018-06-05 DIAGNOSIS — Z95.1: ICD-10-CM

## 2018-06-05 DIAGNOSIS — M86.672: ICD-10-CM

## 2018-06-05 DIAGNOSIS — Z85.89: ICD-10-CM

## 2018-06-05 DIAGNOSIS — E11.621: Primary | ICD-10-CM

## 2018-06-05 DIAGNOSIS — E11.40: ICD-10-CM

## 2018-06-05 DIAGNOSIS — F41.9: ICD-10-CM

## 2018-06-05 DIAGNOSIS — L97.521: ICD-10-CM

## 2018-06-05 DIAGNOSIS — E78.2: ICD-10-CM

## 2018-06-05 DIAGNOSIS — I25.10: ICD-10-CM

## 2018-06-05 DIAGNOSIS — E11.21: ICD-10-CM

## 2018-06-05 DIAGNOSIS — E11.51: ICD-10-CM

## 2018-06-05 DIAGNOSIS — E11.69: ICD-10-CM

## 2018-06-05 PROCEDURE — 11042 DBRDMT SUBQ TIS 1ST 20SQCM/<: CPT

## 2018-06-12 ENCOUNTER — HOSPITAL ENCOUNTER (OUTPATIENT)
Dept: HOSPITAL 90 - WHH | Age: 58
Discharge: HOME | End: 2018-06-12
Attending: FAMILY MEDICINE
Payer: COMMERCIAL

## 2018-06-12 VITALS — SYSTOLIC BLOOD PRESSURE: 172 MMHG | DIASTOLIC BLOOD PRESSURE: 90 MMHG

## 2018-06-12 DIAGNOSIS — E11.22: ICD-10-CM

## 2018-06-12 DIAGNOSIS — L97.521: ICD-10-CM

## 2018-06-12 DIAGNOSIS — I25.10: ICD-10-CM

## 2018-06-12 DIAGNOSIS — I10: ICD-10-CM

## 2018-06-12 DIAGNOSIS — M86.672: ICD-10-CM

## 2018-06-12 DIAGNOSIS — Z86.73: ICD-10-CM

## 2018-06-12 DIAGNOSIS — I12.9: ICD-10-CM

## 2018-06-12 DIAGNOSIS — E11.621: Primary | ICD-10-CM

## 2018-06-12 DIAGNOSIS — N18.9: ICD-10-CM

## 2018-06-12 DIAGNOSIS — F41.9: ICD-10-CM

## 2018-06-12 DIAGNOSIS — E11.69: ICD-10-CM

## 2018-06-12 DIAGNOSIS — E11.21: ICD-10-CM

## 2018-06-12 DIAGNOSIS — E11.51: ICD-10-CM

## 2018-06-12 DIAGNOSIS — E11.40: ICD-10-CM

## 2018-06-12 DIAGNOSIS — E78.2: ICD-10-CM

## 2018-06-12 DIAGNOSIS — Z95.1: ICD-10-CM

## 2018-06-12 PROCEDURE — 99214 OFFICE O/P EST MOD 30 MIN: CPT

## 2018-06-15 ENCOUNTER — HOSPITAL ENCOUNTER (OUTPATIENT)
Dept: HOSPITAL 90 - RAH | Age: 58
Discharge: HOME | End: 2018-06-15
Attending: FAMILY MEDICINE
Payer: COMMERCIAL

## 2018-06-15 DIAGNOSIS — E11.621: Primary | ICD-10-CM

## 2018-06-15 DIAGNOSIS — M79.89: ICD-10-CM

## 2018-06-15 DIAGNOSIS — L97.529: ICD-10-CM

## 2018-06-15 PROCEDURE — 73630 X-RAY EXAM OF FOOT: CPT

## 2018-06-19 ENCOUNTER — HOSPITAL ENCOUNTER (OUTPATIENT)
Dept: HOSPITAL 90 - WHH | Age: 58
Discharge: HOME | End: 2018-06-19
Attending: FAMILY MEDICINE
Payer: COMMERCIAL

## 2018-06-19 VITALS — SYSTOLIC BLOOD PRESSURE: 120 MMHG | DIASTOLIC BLOOD PRESSURE: 79 MMHG

## 2018-06-19 DIAGNOSIS — E11.40: ICD-10-CM

## 2018-06-19 DIAGNOSIS — M86.672: ICD-10-CM

## 2018-06-19 DIAGNOSIS — I12.9: ICD-10-CM

## 2018-06-19 DIAGNOSIS — E78.2: ICD-10-CM

## 2018-06-19 DIAGNOSIS — L97.521: ICD-10-CM

## 2018-06-19 DIAGNOSIS — N18.9: ICD-10-CM

## 2018-06-19 DIAGNOSIS — E11.69: ICD-10-CM

## 2018-06-19 DIAGNOSIS — Z86.73: ICD-10-CM

## 2018-06-19 DIAGNOSIS — I25.10: ICD-10-CM

## 2018-06-19 DIAGNOSIS — Z95.1: ICD-10-CM

## 2018-06-19 DIAGNOSIS — E11.51: ICD-10-CM

## 2018-06-19 DIAGNOSIS — F41.9: ICD-10-CM

## 2018-06-19 DIAGNOSIS — E11.22: ICD-10-CM

## 2018-06-19 DIAGNOSIS — E11.21: ICD-10-CM

## 2018-06-19 DIAGNOSIS — E11.621: Primary | ICD-10-CM

## 2018-06-19 PROCEDURE — 99214 OFFICE O/P EST MOD 30 MIN: CPT

## 2018-06-26 ENCOUNTER — HOSPITAL ENCOUNTER (OUTPATIENT)
Dept: HOSPITAL 90 - WHH | Age: 58
Discharge: HOME | End: 2018-06-26
Attending: FAMILY MEDICINE
Payer: COMMERCIAL

## 2018-06-26 VITALS — SYSTOLIC BLOOD PRESSURE: 167 MMHG | DIASTOLIC BLOOD PRESSURE: 91 MMHG

## 2018-06-26 DIAGNOSIS — N18.9: ICD-10-CM

## 2018-06-26 DIAGNOSIS — Z95.1: ICD-10-CM

## 2018-06-26 DIAGNOSIS — E11.21: ICD-10-CM

## 2018-06-26 DIAGNOSIS — E11.40: ICD-10-CM

## 2018-06-26 DIAGNOSIS — E11.69: ICD-10-CM

## 2018-06-26 DIAGNOSIS — I25.10: ICD-10-CM

## 2018-06-26 DIAGNOSIS — E11.22: ICD-10-CM

## 2018-06-26 DIAGNOSIS — I12.9: ICD-10-CM

## 2018-06-26 DIAGNOSIS — F41.9: ICD-10-CM

## 2018-06-26 DIAGNOSIS — Z86.73: ICD-10-CM

## 2018-06-26 DIAGNOSIS — E11.621: Primary | ICD-10-CM

## 2018-06-26 DIAGNOSIS — E78.2: ICD-10-CM

## 2018-06-26 DIAGNOSIS — L97.521: ICD-10-CM

## 2018-06-26 DIAGNOSIS — M86.672: ICD-10-CM

## 2018-06-26 DIAGNOSIS — E11.51: ICD-10-CM

## 2018-06-26 PROCEDURE — 82948 REAGENT STRIP/BLOOD GLUCOSE: CPT

## 2018-06-26 PROCEDURE — 11042 DBRDMT SUBQ TIS 1ST 20SQCM/<: CPT

## 2018-06-27 ENCOUNTER — HOSPITAL ENCOUNTER (OUTPATIENT)
Dept: HOSPITAL 90 - WHH | Age: 58
Discharge: HOME | End: 2018-06-27
Attending: PODIATRIST
Payer: COMMERCIAL

## 2018-06-27 VITALS — SYSTOLIC BLOOD PRESSURE: 128 MMHG | DIASTOLIC BLOOD PRESSURE: 76 MMHG

## 2018-06-27 DIAGNOSIS — I12.9: ICD-10-CM

## 2018-06-27 DIAGNOSIS — M86.672: ICD-10-CM

## 2018-06-27 DIAGNOSIS — F41.9: ICD-10-CM

## 2018-06-27 DIAGNOSIS — E11.21: ICD-10-CM

## 2018-06-27 DIAGNOSIS — Z86.73: ICD-10-CM

## 2018-06-27 DIAGNOSIS — E11.40: ICD-10-CM

## 2018-06-27 DIAGNOSIS — E11.51: ICD-10-CM

## 2018-06-27 DIAGNOSIS — E11.22: ICD-10-CM

## 2018-06-27 DIAGNOSIS — I25.10: ICD-10-CM

## 2018-06-27 DIAGNOSIS — L97.521: ICD-10-CM

## 2018-06-27 DIAGNOSIS — N18.9: ICD-10-CM

## 2018-06-27 DIAGNOSIS — E78.2: ICD-10-CM

## 2018-06-27 DIAGNOSIS — E11.621: Primary | ICD-10-CM

## 2018-06-27 DIAGNOSIS — E11.69: ICD-10-CM

## 2018-06-27 DIAGNOSIS — Z95.1: ICD-10-CM

## 2018-06-27 PROCEDURE — 99214 OFFICE O/P EST MOD 30 MIN: CPT

## 2018-07-03 ENCOUNTER — HOSPITAL ENCOUNTER (OUTPATIENT)
Dept: HOSPITAL 90 - WHH | Age: 58
Discharge: HOME | End: 2018-07-03
Attending: FAMILY MEDICINE
Payer: COMMERCIAL

## 2018-07-03 VITALS — SYSTOLIC BLOOD PRESSURE: 118 MMHG | DIASTOLIC BLOOD PRESSURE: 69 MMHG

## 2018-07-03 DIAGNOSIS — Z95.1: ICD-10-CM

## 2018-07-03 DIAGNOSIS — E11.51: ICD-10-CM

## 2018-07-03 DIAGNOSIS — E78.2: ICD-10-CM

## 2018-07-03 DIAGNOSIS — E11.22: ICD-10-CM

## 2018-07-03 DIAGNOSIS — N18.9: ICD-10-CM

## 2018-07-03 DIAGNOSIS — E11.69: ICD-10-CM

## 2018-07-03 DIAGNOSIS — E11.621: Primary | ICD-10-CM

## 2018-07-03 DIAGNOSIS — F41.9: ICD-10-CM

## 2018-07-03 DIAGNOSIS — I25.10: ICD-10-CM

## 2018-07-03 DIAGNOSIS — I12.9: ICD-10-CM

## 2018-07-03 DIAGNOSIS — L97.521: ICD-10-CM

## 2018-07-03 DIAGNOSIS — E11.40: ICD-10-CM

## 2018-07-03 DIAGNOSIS — M86.672: ICD-10-CM

## 2018-07-03 DIAGNOSIS — E11.21: ICD-10-CM

## 2018-07-03 DIAGNOSIS — Z86.73: ICD-10-CM

## 2018-07-03 PROCEDURE — 99214 OFFICE O/P EST MOD 30 MIN: CPT

## 2018-09-19 ENCOUNTER — HOSPITAL ENCOUNTER (OUTPATIENT)
Dept: HOSPITAL 90 - WHH | Age: 58
Discharge: HOME | End: 2018-09-19
Attending: PODIATRIST
Payer: COMMERCIAL

## 2018-09-19 VITALS — SYSTOLIC BLOOD PRESSURE: 123 MMHG | DIASTOLIC BLOOD PRESSURE: 84 MMHG

## 2018-09-19 DIAGNOSIS — Z79.82: ICD-10-CM

## 2018-09-19 DIAGNOSIS — E11.22: ICD-10-CM

## 2018-09-19 DIAGNOSIS — Z95.1: ICD-10-CM

## 2018-09-19 DIAGNOSIS — K21.9: ICD-10-CM

## 2018-09-19 DIAGNOSIS — M86.672: ICD-10-CM

## 2018-09-19 DIAGNOSIS — I12.9: ICD-10-CM

## 2018-09-19 DIAGNOSIS — Z85.89: ICD-10-CM

## 2018-09-19 DIAGNOSIS — E11.51: ICD-10-CM

## 2018-09-19 DIAGNOSIS — N18.2: ICD-10-CM

## 2018-09-19 DIAGNOSIS — Z88.6: ICD-10-CM

## 2018-09-19 DIAGNOSIS — Z85.828: ICD-10-CM

## 2018-09-19 DIAGNOSIS — E11.42: ICD-10-CM

## 2018-09-19 DIAGNOSIS — Z86.73: ICD-10-CM

## 2018-09-19 DIAGNOSIS — E11.319: ICD-10-CM

## 2018-09-19 DIAGNOSIS — E11.21: ICD-10-CM

## 2018-09-19 DIAGNOSIS — Z88.5: ICD-10-CM

## 2018-09-19 DIAGNOSIS — E78.2: ICD-10-CM

## 2018-09-19 DIAGNOSIS — F41.9: ICD-10-CM

## 2018-09-19 DIAGNOSIS — Z79.4: ICD-10-CM

## 2018-09-19 DIAGNOSIS — L97.521: ICD-10-CM

## 2018-09-19 DIAGNOSIS — E11.69: ICD-10-CM

## 2018-09-19 DIAGNOSIS — I25.2: ICD-10-CM

## 2018-09-19 DIAGNOSIS — I25.10: ICD-10-CM

## 2018-09-19 DIAGNOSIS — Z79.899: ICD-10-CM

## 2018-09-19 DIAGNOSIS — E11.621: Primary | ICD-10-CM

## 2018-09-19 PROCEDURE — 11042 DBRDMT SUBQ TIS 1ST 20SQCM/<: CPT

## 2018-10-03 ENCOUNTER — HOSPITAL ENCOUNTER (OUTPATIENT)
Dept: HOSPITAL 90 - WHH | Age: 58
Discharge: HOME | End: 2018-10-03
Attending: PODIATRIST
Payer: COMMERCIAL

## 2018-10-03 VITALS — SYSTOLIC BLOOD PRESSURE: 153 MMHG | DIASTOLIC BLOOD PRESSURE: 80 MMHG

## 2018-10-03 DIAGNOSIS — F41.9: ICD-10-CM

## 2018-10-03 DIAGNOSIS — E11.22: ICD-10-CM

## 2018-10-03 DIAGNOSIS — I25.10: ICD-10-CM

## 2018-10-03 DIAGNOSIS — M86.672: ICD-10-CM

## 2018-10-03 DIAGNOSIS — Z88.5: ICD-10-CM

## 2018-10-03 DIAGNOSIS — Z85.828: ICD-10-CM

## 2018-10-03 DIAGNOSIS — E11.69: ICD-10-CM

## 2018-10-03 DIAGNOSIS — E11.621: Primary | ICD-10-CM

## 2018-10-03 DIAGNOSIS — K21.9: ICD-10-CM

## 2018-10-03 DIAGNOSIS — E78.2: ICD-10-CM

## 2018-10-03 DIAGNOSIS — N18.2: ICD-10-CM

## 2018-10-03 DIAGNOSIS — E11.21: ICD-10-CM

## 2018-10-03 DIAGNOSIS — L97.521: ICD-10-CM

## 2018-10-03 DIAGNOSIS — Z79.4: ICD-10-CM

## 2018-10-03 DIAGNOSIS — Z85.89: ICD-10-CM

## 2018-10-03 DIAGNOSIS — E11.51: ICD-10-CM

## 2018-10-03 DIAGNOSIS — Z79.899: ICD-10-CM

## 2018-10-03 DIAGNOSIS — E11.42: ICD-10-CM

## 2018-10-03 DIAGNOSIS — Z88.6: ICD-10-CM

## 2018-10-03 DIAGNOSIS — E11.319: ICD-10-CM

## 2018-10-03 DIAGNOSIS — Z79.82: ICD-10-CM

## 2018-10-03 DIAGNOSIS — Z86.73: ICD-10-CM

## 2018-10-03 DIAGNOSIS — I25.2: ICD-10-CM

## 2018-10-03 DIAGNOSIS — Z95.1: ICD-10-CM

## 2018-10-03 DIAGNOSIS — I12.9: ICD-10-CM

## 2018-10-03 PROCEDURE — 99214 OFFICE O/P EST MOD 30 MIN: CPT

## 2018-10-03 PROCEDURE — 96372 THER/PROPH/DIAG INJ SC/IM: CPT

## 2018-10-10 ENCOUNTER — HOSPITAL ENCOUNTER (OUTPATIENT)
Dept: HOSPITAL 90 - WHH | Age: 58
Discharge: HOME | End: 2018-10-10
Attending: PODIATRIST
Payer: COMMERCIAL

## 2018-10-10 VITALS — DIASTOLIC BLOOD PRESSURE: 78 MMHG | SYSTOLIC BLOOD PRESSURE: 124 MMHG

## 2018-10-10 DIAGNOSIS — E11.69: ICD-10-CM

## 2018-10-10 DIAGNOSIS — I25.10: ICD-10-CM

## 2018-10-10 DIAGNOSIS — Z79.82: ICD-10-CM

## 2018-10-10 DIAGNOSIS — Z86.73: ICD-10-CM

## 2018-10-10 DIAGNOSIS — E11.621: Primary | ICD-10-CM

## 2018-10-10 DIAGNOSIS — Z88.5: ICD-10-CM

## 2018-10-10 DIAGNOSIS — N18.2: ICD-10-CM

## 2018-10-10 DIAGNOSIS — Z79.899: ICD-10-CM

## 2018-10-10 DIAGNOSIS — E11.42: ICD-10-CM

## 2018-10-10 DIAGNOSIS — Z95.1: ICD-10-CM

## 2018-10-10 DIAGNOSIS — L60.1: ICD-10-CM

## 2018-10-10 DIAGNOSIS — L97.521: ICD-10-CM

## 2018-10-10 DIAGNOSIS — I25.2: ICD-10-CM

## 2018-10-10 DIAGNOSIS — Z88.6: ICD-10-CM

## 2018-10-10 DIAGNOSIS — E11.51: ICD-10-CM

## 2018-10-10 DIAGNOSIS — E11.319: ICD-10-CM

## 2018-10-10 DIAGNOSIS — K21.9: ICD-10-CM

## 2018-10-10 DIAGNOSIS — I12.9: ICD-10-CM

## 2018-10-10 DIAGNOSIS — Z79.4: ICD-10-CM

## 2018-10-10 DIAGNOSIS — E11.22: ICD-10-CM

## 2018-10-10 DIAGNOSIS — Z85.89: ICD-10-CM

## 2018-10-10 DIAGNOSIS — M86.672: ICD-10-CM

## 2018-10-10 DIAGNOSIS — E78.2: ICD-10-CM

## 2018-10-10 DIAGNOSIS — F41.9: ICD-10-CM

## 2018-10-10 DIAGNOSIS — E11.21: ICD-10-CM

## 2018-10-12 ENCOUNTER — HOSPITAL ENCOUNTER (OUTPATIENT)
Dept: HOSPITAL 90 - RAH | Age: 58
Discharge: HOME | End: 2018-10-12
Attending: PODIATRIST
Payer: COMMERCIAL

## 2018-10-12 DIAGNOSIS — M77.32: ICD-10-CM

## 2018-10-12 DIAGNOSIS — E11.621: Primary | ICD-10-CM

## 2018-10-12 DIAGNOSIS — M19.072: ICD-10-CM

## 2018-10-12 PROCEDURE — 73630 X-RAY EXAM OF FOOT: CPT

## 2018-10-17 ENCOUNTER — HOSPITAL ENCOUNTER (OUTPATIENT)
Dept: HOSPITAL 90 - WHH | Age: 58
Discharge: HOME | End: 2018-10-17
Attending: PODIATRIST
Payer: COMMERCIAL

## 2018-10-17 VITALS — SYSTOLIC BLOOD PRESSURE: 148 MMHG | DIASTOLIC BLOOD PRESSURE: 89 MMHG

## 2018-10-17 DIAGNOSIS — E11.51: ICD-10-CM

## 2018-10-17 DIAGNOSIS — Z79.899: ICD-10-CM

## 2018-10-17 DIAGNOSIS — F41.9: ICD-10-CM

## 2018-10-17 DIAGNOSIS — I25.10: ICD-10-CM

## 2018-10-17 DIAGNOSIS — E11.621: Primary | ICD-10-CM

## 2018-10-17 DIAGNOSIS — I12.9: ICD-10-CM

## 2018-10-17 DIAGNOSIS — E11.21: ICD-10-CM

## 2018-10-17 DIAGNOSIS — E11.22: ICD-10-CM

## 2018-10-17 DIAGNOSIS — Z85.89: ICD-10-CM

## 2018-10-17 DIAGNOSIS — Z86.73: ICD-10-CM

## 2018-10-17 DIAGNOSIS — Z88.6: ICD-10-CM

## 2018-10-17 DIAGNOSIS — E11.69: ICD-10-CM

## 2018-10-17 DIAGNOSIS — Z85.828: ICD-10-CM

## 2018-10-17 DIAGNOSIS — Z88.5: ICD-10-CM

## 2018-10-17 DIAGNOSIS — E11.319: ICD-10-CM

## 2018-10-17 DIAGNOSIS — I25.2: ICD-10-CM

## 2018-10-17 DIAGNOSIS — Z79.82: ICD-10-CM

## 2018-10-17 DIAGNOSIS — N18.2: ICD-10-CM

## 2018-10-17 DIAGNOSIS — K21.9: ICD-10-CM

## 2018-10-17 DIAGNOSIS — Z79.4: ICD-10-CM

## 2018-10-17 DIAGNOSIS — L97.528: ICD-10-CM

## 2018-10-17 DIAGNOSIS — Z95.1: ICD-10-CM

## 2018-10-17 DIAGNOSIS — E11.42: ICD-10-CM

## 2018-10-17 DIAGNOSIS — M86.672: ICD-10-CM

## 2018-10-17 DIAGNOSIS — E78.2: ICD-10-CM

## 2018-10-17 PROCEDURE — 99214 OFFICE O/P EST MOD 30 MIN: CPT

## 2018-10-30 ENCOUNTER — HOSPITAL ENCOUNTER (OUTPATIENT)
Dept: HOSPITAL 90 - WHH | Age: 58
Discharge: HOME | End: 2018-10-30
Attending: FAMILY MEDICINE
Payer: COMMERCIAL

## 2018-10-30 ENCOUNTER — HOSPITAL ENCOUNTER (INPATIENT)
Dept: HOSPITAL 90 - EDH | Age: 58
LOS: 25 days | Discharge: HOME | DRG: 622 | End: 2018-11-24
Attending: INTERNAL MEDICINE | Admitting: INTERNAL MEDICINE
Payer: COMMERCIAL

## 2018-10-30 VITALS — SYSTOLIC BLOOD PRESSURE: 106 MMHG | DIASTOLIC BLOOD PRESSURE: 67 MMHG

## 2018-10-30 VITALS — HEIGHT: 69 IN | WEIGHT: 174.4 LBS | BODY MASS INDEX: 25.83 KG/M2

## 2018-10-30 VITALS — DIASTOLIC BLOOD PRESSURE: 64 MMHG | SYSTOLIC BLOOD PRESSURE: 114 MMHG

## 2018-10-30 VITALS — DIASTOLIC BLOOD PRESSURE: 57 MMHG | SYSTOLIC BLOOD PRESSURE: 105 MMHG

## 2018-10-30 DIAGNOSIS — J84.10: ICD-10-CM

## 2018-10-30 DIAGNOSIS — E78.5: ICD-10-CM

## 2018-10-30 DIAGNOSIS — I25.10: ICD-10-CM

## 2018-10-30 DIAGNOSIS — I11.0: ICD-10-CM

## 2018-10-30 DIAGNOSIS — M19.072: ICD-10-CM

## 2018-10-30 DIAGNOSIS — E11.51: ICD-10-CM

## 2018-10-30 DIAGNOSIS — Z79.82: ICD-10-CM

## 2018-10-30 DIAGNOSIS — E11.65: ICD-10-CM

## 2018-10-30 DIAGNOSIS — K21.9: ICD-10-CM

## 2018-10-30 DIAGNOSIS — Z95.1: ICD-10-CM

## 2018-10-30 DIAGNOSIS — I25.2: ICD-10-CM

## 2018-10-30 DIAGNOSIS — Z79.4: ICD-10-CM

## 2018-10-30 DIAGNOSIS — Z88.5: ICD-10-CM

## 2018-10-30 DIAGNOSIS — E11.319: ICD-10-CM

## 2018-10-30 DIAGNOSIS — Z86.73: ICD-10-CM

## 2018-10-30 DIAGNOSIS — J47.9: ICD-10-CM

## 2018-10-30 DIAGNOSIS — E11.42: ICD-10-CM

## 2018-10-30 DIAGNOSIS — E11.69: ICD-10-CM

## 2018-10-30 DIAGNOSIS — E87.2: ICD-10-CM

## 2018-10-30 DIAGNOSIS — G89.29: ICD-10-CM

## 2018-10-30 DIAGNOSIS — L97.521: ICD-10-CM

## 2018-10-30 DIAGNOSIS — L02.612: ICD-10-CM

## 2018-10-30 DIAGNOSIS — Z98.49: ICD-10-CM

## 2018-10-30 DIAGNOSIS — I12.9: ICD-10-CM

## 2018-10-30 DIAGNOSIS — L97.529: ICD-10-CM

## 2018-10-30 DIAGNOSIS — Z93.0: ICD-10-CM

## 2018-10-30 DIAGNOSIS — L03.032: ICD-10-CM

## 2018-10-30 DIAGNOSIS — E78.2: ICD-10-CM

## 2018-10-30 DIAGNOSIS — Z88.8: ICD-10-CM

## 2018-10-30 DIAGNOSIS — Z85.828: ICD-10-CM

## 2018-10-30 DIAGNOSIS — Z83.3: ICD-10-CM

## 2018-10-30 DIAGNOSIS — A04.72: ICD-10-CM

## 2018-10-30 DIAGNOSIS — E66.9: ICD-10-CM

## 2018-10-30 DIAGNOSIS — L03.116: ICD-10-CM

## 2018-10-30 DIAGNOSIS — F41.9: ICD-10-CM

## 2018-10-30 DIAGNOSIS — Z85.89: ICD-10-CM

## 2018-10-30 DIAGNOSIS — E87.6: ICD-10-CM

## 2018-10-30 DIAGNOSIS — Z79.899: ICD-10-CM

## 2018-10-30 DIAGNOSIS — M86.8X7: ICD-10-CM

## 2018-10-30 DIAGNOSIS — Z88.6: ICD-10-CM

## 2018-10-30 DIAGNOSIS — Z82.5: ICD-10-CM

## 2018-10-30 DIAGNOSIS — N18.2: ICD-10-CM

## 2018-10-30 DIAGNOSIS — E11.21: ICD-10-CM

## 2018-10-30 DIAGNOSIS — E11.621: Primary | ICD-10-CM

## 2018-10-30 DIAGNOSIS — K80.20: ICD-10-CM

## 2018-10-30 DIAGNOSIS — Z82.49: ICD-10-CM

## 2018-10-30 DIAGNOSIS — M86.672: ICD-10-CM

## 2018-10-30 DIAGNOSIS — E11.22: ICD-10-CM

## 2018-10-30 DIAGNOSIS — I50.31: ICD-10-CM

## 2018-10-30 DIAGNOSIS — Z85.830: ICD-10-CM

## 2018-10-30 DIAGNOSIS — I35.0: ICD-10-CM

## 2018-10-30 LAB
ALBUMIN SERPL-MCNC: 3.2 G/DL (ref 3.5–5)
ALT SERPL-CCNC: 23 U/L (ref 12–78)
AST SERPL-CCNC: 15 U/L (ref 10–37)
BASOPHILS NFR BLD AUTO: 0.6 % (ref 0–5)
BILIRUB SERPL-MCNC: 0.7 MG/DL (ref 0.2–1)
BUN SERPL-MCNC: 23 MG/DL (ref 7–18)
CHLORIDE SERPL-SCNC: 99 MMOL/L (ref 101–111)
CO2 SERPL-SCNC: 27 MMOL/L (ref 21–32)
CREAT SERPL-MCNC: 1.1 MG/DL (ref 0.5–1.5)
EOSINOPHIL NFR BLD AUTO: 2 % (ref 0–8)
ERYTHROCYTE [DISTWIDTH] IN BLOOD BY AUTOMATED COUNT: 15.1 % (ref 11–15.5)
ERYTHROCYTE [SEDIMENTATION RATE] IN BLOOD BY WESTERGREN METHOD: 58 MM/HR (ref 0–20)
GFR SERPL CREATININE-BSD FRML MDRD: 73 ML/MIN (ref 60–?)
GLUCOSE SERPL-MCNC: 301 MG/DL (ref 70–105)
HBA1C MFR BLD: 9.4 % (ref 4–6)
HCT VFR BLD AUTO: 35.3 % (ref 42–54)
LYMPHOCYTES NFR SPEC AUTO: 24.3 % (ref 21–51)
MCH RBC QN AUTO: 32.4 PG (ref 27–33)
MCHC RBC AUTO-ENTMCNC: 33 G/DL (ref 32–36)
MCV RBC AUTO: 98.2 FL (ref 79–99)
MONOCYTES NFR BLD AUTO: 14.2 % (ref 3–13)
NEUTROPHILS NFR BLD AUTO: 58.9 % (ref 40–77)
NRBC BLD MANUAL-RTO: 0 % (ref 0–0.19)
PLATELET # BLD AUTO: 125 K/UL (ref 130–400)
POTASSIUM SERPL-SCNC: 4.6 MMOL/L (ref 3.5–5.1)
PROT SERPL-MCNC: 7.4 G/DL (ref 6–8.3)
RBC # BLD AUTO: 3.6 MIL/UL (ref 4.5–6.2)
SODIUM SERPL-SCNC: 137 MMOL/L (ref 136–145)
WBC # BLD AUTO: 4.7 K/UL (ref 4.8–10.8)

## 2018-10-30 PROCEDURE — 83880 ASSAY OF NATRIURETIC PEPTIDE: CPT

## 2018-10-30 PROCEDURE — 99156 MOD SED OTH PHYS/QHP 5/>YRS: CPT

## 2018-10-30 PROCEDURE — 85027 COMPLETE CBC AUTOMATED: CPT

## 2018-10-30 PROCEDURE — 80202 ASSAY OF VANCOMYCIN: CPT

## 2018-10-30 PROCEDURE — 73718 MRI LOWER EXTREMITY W/O DYE: CPT

## 2018-10-30 PROCEDURE — 71250 CT THORAX DX C-: CPT

## 2018-10-30 PROCEDURE — 87070 CULTURE OTHR SPECIMN AEROBIC: CPT

## 2018-10-30 PROCEDURE — 87040 BLOOD CULTURE FOR BACTERIA: CPT

## 2018-10-30 PROCEDURE — 83735 ASSAY OF MAGNESIUM: CPT

## 2018-10-30 PROCEDURE — 94640 AIRWAY INHALATION TREATMENT: CPT

## 2018-10-30 PROCEDURE — 84550 ASSAY OF BLOOD/URIC ACID: CPT

## 2018-10-30 PROCEDURE — 85025 COMPLETE CBC W/AUTO DIFF WBC: CPT

## 2018-10-30 PROCEDURE — 99157 MOD SED OTHER PHYS/QHP EA: CPT

## 2018-10-30 PROCEDURE — 84132 ASSAY OF SERUM POTASSIUM: CPT

## 2018-10-30 PROCEDURE — 75635 CT ANGIO ABDOMINAL ARTERIES: CPT

## 2018-10-30 PROCEDURE — 75710 ARTERY X-RAYS ARM/LEG: CPT

## 2018-10-30 PROCEDURE — 86140 C-REACTIVE PROTEIN: CPT

## 2018-10-30 PROCEDURE — 83036 HEMOGLOBIN GLYCOSYLATED A1C: CPT

## 2018-10-30 PROCEDURE — 80061 LIPID PANEL: CPT

## 2018-10-30 PROCEDURE — 99213 OFFICE O/P EST LOW 20 MIN: CPT

## 2018-10-30 PROCEDURE — 87076 CULTURE ANAEROBE IDENT EACH: CPT

## 2018-10-30 PROCEDURE — 83605 ASSAY OF LACTIC ACID: CPT

## 2018-10-30 PROCEDURE — 87493 C DIFF AMPLIFIED PROBE: CPT

## 2018-10-30 PROCEDURE — 36415 COLL VENOUS BLD VENIPUNCTURE: CPT

## 2018-10-30 PROCEDURE — 94664 DEMO&/EVAL PT USE INHALER: CPT

## 2018-10-30 PROCEDURE — 37230: CPT

## 2018-10-30 PROCEDURE — 85651 RBC SED RATE NONAUTOMATED: CPT

## 2018-10-30 PROCEDURE — 82948 REAGENT STRIP/BLOOD GLUCOSE: CPT

## 2018-10-30 PROCEDURE — 80053 COMPREHEN METABOLIC PANEL: CPT

## 2018-10-30 PROCEDURE — 80048 BASIC METABOLIC PNL TOTAL CA: CPT

## 2018-10-30 PROCEDURE — 87186 SC STD MICRODIL/AGAR DIL: CPT

## 2018-10-30 PROCEDURE — 93925 LOWER EXTREMITY STUDY: CPT

## 2018-10-30 PROCEDURE — 11042 DBRDMT SUBQ TIS 1ST 20SQCM/<: CPT

## 2018-10-30 PROCEDURE — 85730 THROMBOPLASTIN TIME PARTIAL: CPT

## 2018-10-30 PROCEDURE — 85610 PROTHROMBIN TIME: CPT

## 2018-10-30 PROCEDURE — 71045 X-RAY EXAM CHEST 1 VIEW: CPT

## 2018-10-30 PROCEDURE — 87077 CULTURE AEROBIC IDENTIFY: CPT

## 2018-10-30 RX ADMIN — PIPERACILLIN SODIUM AND TAZOBACTAM SODIUM SCH MLS/HR: .375; 3 INJECTION, POWDER, LYOPHILIZED, FOR SOLUTION INTRAVENOUS at 23:50

## 2018-10-30 RX ADMIN — SODIUM CHLORIDE SCH MLS/HR: 0.9 INJECTION, SOLUTION INTRAVENOUS at 19:10

## 2018-10-30 RX ADMIN — SODIUM CHLORIDE SCH UNIT: 9 INJECTION, SOLUTION INTRAVENOUS at 20:43

## 2018-10-31 VITALS — SYSTOLIC BLOOD PRESSURE: 145 MMHG | DIASTOLIC BLOOD PRESSURE: 84 MMHG

## 2018-10-31 VITALS — SYSTOLIC BLOOD PRESSURE: 118 MMHG | DIASTOLIC BLOOD PRESSURE: 77 MMHG

## 2018-10-31 VITALS — SYSTOLIC BLOOD PRESSURE: 119 MMHG | DIASTOLIC BLOOD PRESSURE: 68 MMHG

## 2018-10-31 VITALS — SYSTOLIC BLOOD PRESSURE: 156 MMHG | DIASTOLIC BLOOD PRESSURE: 82 MMHG

## 2018-10-31 VITALS — DIASTOLIC BLOOD PRESSURE: 77 MMHG | SYSTOLIC BLOOD PRESSURE: 120 MMHG

## 2018-10-31 LAB
ALBUMIN SERPL-MCNC: 2.9 G/DL (ref 3.5–5)
ALT SERPL-CCNC: 18 U/L (ref 12–78)
AST SERPL-CCNC: 15 U/L (ref 10–37)
BASOPHILS NFR BLD AUTO: 0.5 % (ref 0–5)
BILIRUB SERPL-MCNC: 0.5 MG/DL (ref 0.2–1)
BUN SERPL-MCNC: 19 MG/DL (ref 7–18)
CHLORIDE SERPL-SCNC: 104 MMOL/L (ref 101–111)
CO2 SERPL-SCNC: 27 MMOL/L (ref 21–32)
CREAT SERPL-MCNC: 0.9 MG/DL (ref 0.5–1.5)
EOSINOPHIL NFR BLD AUTO: 2.1 % (ref 0–8)
ERYTHROCYTE [DISTWIDTH] IN BLOOD BY AUTOMATED COUNT: 15.1 % (ref 11–15.5)
GFR SERPL CREATININE-BSD FRML MDRD: 92 ML/MIN (ref 60–?)
GLUCOSE SERPL-MCNC: 156 MG/DL (ref 70–105)
HCT VFR BLD AUTO: 31.9 % (ref 42–54)
LYMPHOCYTES NFR SPEC AUTO: 20.3 % (ref 21–51)
MCH RBC QN AUTO: 33.7 PG (ref 27–33)
MCHC RBC AUTO-ENTMCNC: 34.6 G/DL (ref 32–36)
MCV RBC AUTO: 97.2 FL (ref 79–99)
MONOCYTES NFR BLD AUTO: 12.7 % (ref 3–13)
NEUTROPHILS NFR BLD AUTO: 64.4 % (ref 40–77)
NRBC BLD MANUAL-RTO: 0 % (ref 0–0.19)
PLATELET # BLD AUTO: 129 K/UL (ref 130–400)
POTASSIUM SERPL-SCNC: 4.2 MMOL/L (ref 3.5–5.1)
PROT SERPL-MCNC: 6.7 G/DL (ref 6–8.3)
RBC # BLD AUTO: 3.29 MIL/UL (ref 4.5–6.2)
SODIUM SERPL-SCNC: 138 MMOL/L (ref 136–145)
WBC # BLD AUTO: 4.4 K/UL (ref 4.8–10.8)

## 2018-10-31 RX ADMIN — ATENOLOL SCH MG: 25 TABLET ORAL at 21:05

## 2018-10-31 RX ADMIN — VANCOMYCIN HYDROCHLORIDE SCH MLS/HR: 1 INJECTION, POWDER, LYOPHILIZED, FOR SOLUTION INTRAVENOUS at 09:42

## 2018-10-31 RX ADMIN — SODIUM CHLORIDE SCH UNIT: 9 INJECTION, SOLUTION INTRAVENOUS at 07:30

## 2018-10-31 RX ADMIN — PIPERACILLIN SODIUM AND TAZOBACTAM SODIUM SCH MLS/HR: .375; 3 INJECTION, POWDER, LYOPHILIZED, FOR SOLUTION INTRAVENOUS at 14:55

## 2018-10-31 RX ADMIN — SODIUM CHLORIDE SCH UNIT: 9 INJECTION, SOLUTION INTRAVENOUS at 11:30

## 2018-10-31 RX ADMIN — SODIUM CHLORIDE SCH MLS/HR: 0.9 INJECTION, SOLUTION INTRAVENOUS at 09:43

## 2018-10-31 RX ADMIN — SODIUM CHLORIDE SCH UNIT: 9 INJECTION, SOLUTION INTRAVENOUS at 17:34

## 2018-10-31 RX ADMIN — ENOXAPARIN SODIUM SCH MG: 30 INJECTION SUBCUTANEOUS at 09:43

## 2018-10-31 RX ADMIN — PANTOPRAZOLE SODIUM SCH MG: 40 TABLET, DELAYED RELEASE ORAL at 09:42

## 2018-10-31 RX ADMIN — PIPERACILLIN SODIUM AND TAZOBACTAM SODIUM SCH MLS/HR: .375; 3 INJECTION, POWDER, LYOPHILIZED, FOR SOLUTION INTRAVENOUS at 05:56

## 2018-10-31 RX ADMIN — SODIUM CHLORIDE SCH MLS/HR: 0.9 INJECTION, SOLUTION INTRAVENOUS at 21:27

## 2018-10-31 RX ADMIN — PIPERACILLIN SODIUM AND TAZOBACTAM SODIUM SCH MLS/HR: .375; 3 INJECTION, POWDER, LYOPHILIZED, FOR SOLUTION INTRAVENOUS at 21:12

## 2018-10-31 RX ADMIN — SODIUM CHLORIDE SCH UNIT: 9 INJECTION, SOLUTION INTRAVENOUS at 21:26

## 2018-10-31 RX ADMIN — VANCOMYCIN HYDROCHLORIDE SCH MLS/HR: 1 INJECTION, POWDER, LYOPHILIZED, FOR SOLUTION INTRAVENOUS at 23:24

## 2018-10-31 RX ADMIN — ATORVASTATIN CALCIUM SCH MG: 40 TABLET, FILM COATED ORAL at 21:05

## 2018-11-01 VITALS — DIASTOLIC BLOOD PRESSURE: 79 MMHG | SYSTOLIC BLOOD PRESSURE: 135 MMHG

## 2018-11-01 VITALS — SYSTOLIC BLOOD PRESSURE: 115 MMHG | DIASTOLIC BLOOD PRESSURE: 71 MMHG

## 2018-11-01 VITALS — DIASTOLIC BLOOD PRESSURE: 91 MMHG | SYSTOLIC BLOOD PRESSURE: 156 MMHG

## 2018-11-01 VITALS — DIASTOLIC BLOOD PRESSURE: 83 MMHG | SYSTOLIC BLOOD PRESSURE: 141 MMHG

## 2018-11-01 VITALS — SYSTOLIC BLOOD PRESSURE: 139 MMHG | DIASTOLIC BLOOD PRESSURE: 82 MMHG

## 2018-11-01 VITALS — DIASTOLIC BLOOD PRESSURE: 91 MMHG | SYSTOLIC BLOOD PRESSURE: 170 MMHG

## 2018-11-01 LAB
BASOPHILS NFR BLD AUTO: 0.5 % (ref 0–5)
BUN SERPL-MCNC: 13 MG/DL (ref 7–18)
CHLORIDE SERPL-SCNC: 105 MMOL/L (ref 101–111)
CO2 SERPL-SCNC: 29 MMOL/L (ref 21–32)
CREAT SERPL-MCNC: 0.9 MG/DL (ref 0.5–1.5)
EOSINOPHIL NFR BLD AUTO: 2.2 % (ref 0–8)
ERYTHROCYTE [DISTWIDTH] IN BLOOD BY AUTOMATED COUNT: 15.4 % (ref 11–15.5)
GFR SERPL CREATININE-BSD FRML MDRD: 92 ML/MIN (ref 60–?)
GLUCOSE SERPL-MCNC: 99 MG/DL (ref 70–105)
HCT VFR BLD AUTO: 31.7 % (ref 42–54)
LYMPHOCYTES NFR SPEC AUTO: 26.3 % (ref 21–51)
MCH RBC QN AUTO: 33.6 PG (ref 27–33)
MCHC RBC AUTO-ENTMCNC: 34.8 G/DL (ref 32–36)
MCV RBC AUTO: 96.6 FL (ref 79–99)
MONOCYTES NFR BLD AUTO: 12.7 % (ref 3–13)
NEUTROPHILS NFR BLD AUTO: 58.3 % (ref 40–77)
NRBC BLD MANUAL-RTO: 0 % (ref 0–0.19)
PLATELET # BLD AUTO: 132 K/UL (ref 130–400)
POTASSIUM SERPL-SCNC: 3.9 MMOL/L (ref 3.5–5.1)
RBC # BLD AUTO: 3.28 MIL/UL (ref 4.5–6.2)
SODIUM SERPL-SCNC: 139 MMOL/L (ref 136–145)
WBC # BLD AUTO: 4.6 K/UL (ref 4.8–10.8)

## 2018-11-01 RX ADMIN — SODIUM CHLORIDE SCH MLS/HR: 0.9 INJECTION, SOLUTION INTRAVENOUS at 12:05

## 2018-11-01 RX ADMIN — LISINOPRIL SCH MG: 5 TABLET ORAL at 09:11

## 2018-11-01 RX ADMIN — SODIUM CHLORIDE SCH MLS/HR: 9 INJECTION, SOLUTION INTRAVENOUS at 18:16

## 2018-11-01 RX ADMIN — ASPIRIN TAB DELAYED RELEASE 81 MG SCH MG: 81 TABLET DELAYED RESPONSE at 09:11

## 2018-11-01 RX ADMIN — ATORVASTATIN CALCIUM SCH MG: 40 TABLET, FILM COATED ORAL at 22:02

## 2018-11-01 RX ADMIN — SODIUM CHLORIDE SCH UNIT: 9 INJECTION, SOLUTION INTRAVENOUS at 13:31

## 2018-11-01 RX ADMIN — ATENOLOL SCH MG: 25 TABLET ORAL at 09:10

## 2018-11-01 RX ADMIN — PIPERACILLIN SODIUM AND TAZOBACTAM SODIUM SCH MLS/HR: .375; 3 INJECTION, POWDER, LYOPHILIZED, FOR SOLUTION INTRAVENOUS at 04:34

## 2018-11-01 RX ADMIN — Medication SCH MG: at 09:00

## 2018-11-01 RX ADMIN — SODIUM CHLORIDE SCH UNIT: 9 INJECTION, SOLUTION INTRAVENOUS at 18:31

## 2018-11-01 RX ADMIN — ENOXAPARIN SODIUM SCH MG: 30 INJECTION SUBCUTANEOUS at 09:12

## 2018-11-01 RX ADMIN — VANCOMYCIN HYDROCHLORIDE SCH MLS/HR: 1 INJECTION, POWDER, LYOPHILIZED, FOR SOLUTION INTRAVENOUS at 09:10

## 2018-11-01 RX ADMIN — PIPERACILLIN SODIUM AND TAZOBACTAM SODIUM SCH MLS/HR: .375; 3 INJECTION, POWDER, LYOPHILIZED, FOR SOLUTION INTRAVENOUS at 15:46

## 2018-11-01 RX ADMIN — CLOPIDOGREL BISULFATE SCH MG: 75 TABLET, FILM COATED ORAL at 09:11

## 2018-11-01 RX ADMIN — SODIUM CHLORIDE SCH UNIT: 9 INJECTION, SOLUTION INTRAVENOUS at 22:08

## 2018-11-01 RX ADMIN — SODIUM CHLORIDE SCH MLS/HR: 0.9 INJECTION, SOLUTION INTRAVENOUS at 06:27

## 2018-11-01 RX ADMIN — PIPERACILLIN SODIUM AND TAZOBACTAM SODIUM SCH MLS/HR: .375; 3 INJECTION, POWDER, LYOPHILIZED, FOR SOLUTION INTRAVENOUS at 22:02

## 2018-11-01 RX ADMIN — SODIUM CHLORIDE SCH UNIT: 9 INJECTION, SOLUTION INTRAVENOUS at 06:20

## 2018-11-01 RX ADMIN — CHLORTHALIDONE SCH EACH: 50 TABLET ORAL at 09:00

## 2018-11-01 RX ADMIN — PANTOPRAZOLE SODIUM SCH MG: 40 TABLET, DELAYED RELEASE ORAL at 09:11

## 2018-11-01 RX ADMIN — ATENOLOL SCH MG: 25 TABLET ORAL at 22:03

## 2018-11-02 VITALS — DIASTOLIC BLOOD PRESSURE: 82 MMHG | SYSTOLIC BLOOD PRESSURE: 133 MMHG

## 2018-11-02 VITALS — SYSTOLIC BLOOD PRESSURE: 133 MMHG | DIASTOLIC BLOOD PRESSURE: 87 MMHG

## 2018-11-02 VITALS — DIASTOLIC BLOOD PRESSURE: 72 MMHG | SYSTOLIC BLOOD PRESSURE: 130 MMHG

## 2018-11-02 VITALS — DIASTOLIC BLOOD PRESSURE: 85 MMHG | SYSTOLIC BLOOD PRESSURE: 151 MMHG

## 2018-11-02 VITALS — DIASTOLIC BLOOD PRESSURE: 83 MMHG | SYSTOLIC BLOOD PRESSURE: 138 MMHG

## 2018-11-02 VITALS — DIASTOLIC BLOOD PRESSURE: 84 MMHG | SYSTOLIC BLOOD PRESSURE: 151 MMHG

## 2018-11-02 LAB
ALBUMIN SERPL-MCNC: 2.9 G/DL (ref 3.5–5)
ALT SERPL-CCNC: 20 U/L (ref 12–78)
AST SERPL-CCNC: 19 U/L (ref 10–37)
BILIRUB SERPL-MCNC: 1 MG/DL (ref 0.2–1)
BUN SERPL-MCNC: 13 MG/DL (ref 7–18)
CHLORIDE SERPL-SCNC: 104 MMOL/L (ref 101–111)
CO2 SERPL-SCNC: 28 MMOL/L (ref 21–32)
CREAT SERPL-MCNC: 0.9 MG/DL (ref 0.5–1.5)
ERYTHROCYTE [DISTWIDTH] IN BLOOD BY AUTOMATED COUNT: 14.9 % (ref 11–15.5)
GFR SERPL CREATININE-BSD FRML MDRD: 92 ML/MIN (ref 60–?)
GLUCOSE SERPL-MCNC: 103 MG/DL (ref 70–105)
HCT VFR BLD AUTO: 34.2 % (ref 42–54)
MCH RBC QN AUTO: 33.2 PG (ref 27–33)
MCHC RBC AUTO-ENTMCNC: 34.1 G/DL (ref 32–36)
MCV RBC AUTO: 97.2 FL (ref 79–99)
NRBC BLD MANUAL-RTO: 0 % (ref 0–0.19)
PLATELET # BLD AUTO: 129 K/UL (ref 130–400)
POTASSIUM SERPL-SCNC: 3.8 MMOL/L (ref 3.5–5.1)
PROT SERPL-MCNC: 7 G/DL (ref 6–8.3)
RBC # BLD AUTO: 3.51 MIL/UL (ref 4.5–6.2)
SODIUM SERPL-SCNC: 139 MMOL/L (ref 136–145)
WBC # BLD AUTO: 5.2 K/UL (ref 4.8–10.8)

## 2018-11-02 RX ADMIN — Medication SCH MG: at 09:11

## 2018-11-02 RX ADMIN — CLOPIDOGREL BISULFATE SCH MG: 75 TABLET, FILM COATED ORAL at 09:10

## 2018-11-02 RX ADMIN — PANTOPRAZOLE SODIUM SCH MG: 40 TABLET, DELAYED RELEASE ORAL at 09:10

## 2018-11-02 RX ADMIN — ATENOLOL SCH MG: 25 TABLET ORAL at 20:23

## 2018-11-02 RX ADMIN — SODIUM CHLORIDE SCH UNIT: 9 INJECTION, SOLUTION INTRAVENOUS at 06:31

## 2018-11-02 RX ADMIN — ASPIRIN TAB DELAYED RELEASE 81 MG SCH MG: 81 TABLET DELAYED RESPONSE at 09:10

## 2018-11-02 RX ADMIN — ENOXAPARIN SODIUM SCH MG: 30 INJECTION SUBCUTANEOUS at 09:11

## 2018-11-02 RX ADMIN — SODIUM CHLORIDE SCH MLS/HR: 9 INJECTION, SOLUTION INTRAVENOUS at 02:35

## 2018-11-02 RX ADMIN — SODIUM CHLORIDE SCH MLS/HR: 9 INJECTION, SOLUTION INTRAVENOUS at 12:02

## 2018-11-02 RX ADMIN — SODIUM CHLORIDE SCH UNIT: 9 INJECTION, SOLUTION INTRAVENOUS at 12:12

## 2018-11-02 RX ADMIN — ATORVASTATIN CALCIUM SCH MG: 40 TABLET, FILM COATED ORAL at 20:24

## 2018-11-02 RX ADMIN — PIPERACILLIN SODIUM AND TAZOBACTAM SODIUM SCH MLS/HR: .375; 3 INJECTION, POWDER, LYOPHILIZED, FOR SOLUTION INTRAVENOUS at 21:22

## 2018-11-02 RX ADMIN — PIPERACILLIN SODIUM AND TAZOBACTAM SODIUM SCH MLS/HR: .375; 3 INJECTION, POWDER, LYOPHILIZED, FOR SOLUTION INTRAVENOUS at 04:59

## 2018-11-02 RX ADMIN — ATENOLOL SCH MG: 25 TABLET ORAL at 09:10

## 2018-11-02 RX ADMIN — SODIUM CHLORIDE SCH MLS/HR: 0.9 INJECTION, SOLUTION INTRAVENOUS at 03:23

## 2018-11-02 RX ADMIN — LISINOPRIL SCH MG: 5 TABLET ORAL at 09:09

## 2018-11-02 RX ADMIN — SODIUM CHLORIDE SCH UNIT: 9 INJECTION, SOLUTION INTRAVENOUS at 17:33

## 2018-11-02 RX ADMIN — SODIUM CHLORIDE SCH MLS/HR: 9 INJECTION, SOLUTION INTRAVENOUS at 18:18

## 2018-11-02 RX ADMIN — SODIUM CHLORIDE SCH MLS/HR: 0.9 INJECTION, SOLUTION INTRAVENOUS at 09:14

## 2018-11-02 RX ADMIN — CHLORTHALIDONE SCH EACH: 50 TABLET ORAL at 09:00

## 2018-11-02 RX ADMIN — SODIUM CHLORIDE SCH UNIT: 9 INJECTION, SOLUTION INTRAVENOUS at 21:23

## 2018-11-02 RX ADMIN — PIPERACILLIN SODIUM AND TAZOBACTAM SODIUM SCH MLS/HR: .375; 3 INJECTION, POWDER, LYOPHILIZED, FOR SOLUTION INTRAVENOUS at 14:28

## 2018-11-03 VITALS — DIASTOLIC BLOOD PRESSURE: 87 MMHG | SYSTOLIC BLOOD PRESSURE: 141 MMHG

## 2018-11-03 VITALS — DIASTOLIC BLOOD PRESSURE: 63 MMHG | SYSTOLIC BLOOD PRESSURE: 173 MMHG

## 2018-11-03 VITALS — SYSTOLIC BLOOD PRESSURE: 157 MMHG | DIASTOLIC BLOOD PRESSURE: 71 MMHG

## 2018-11-03 VITALS — SYSTOLIC BLOOD PRESSURE: 137 MMHG | DIASTOLIC BLOOD PRESSURE: 77 MMHG

## 2018-11-03 VITALS — DIASTOLIC BLOOD PRESSURE: 84 MMHG | SYSTOLIC BLOOD PRESSURE: 137 MMHG

## 2018-11-03 VITALS — DIASTOLIC BLOOD PRESSURE: 88 MMHG | SYSTOLIC BLOOD PRESSURE: 153 MMHG

## 2018-11-03 VITALS — SYSTOLIC BLOOD PRESSURE: 129 MMHG | DIASTOLIC BLOOD PRESSURE: 78 MMHG

## 2018-11-03 LAB
BASOPHILS NFR BLD AUTO: 0.6 % (ref 0–5)
BUN SERPL-MCNC: 14 MG/DL (ref 7–18)
CHLORIDE SERPL-SCNC: 103 MMOL/L (ref 101–111)
CO2 SERPL-SCNC: 27 MMOL/L (ref 21–32)
CREAT SERPL-MCNC: 1 MG/DL (ref 0.5–1.5)
EOSINOPHIL NFR BLD AUTO: 1.8 % (ref 0–8)
ERYTHROCYTE [DISTWIDTH] IN BLOOD BY AUTOMATED COUNT: 14.8 % (ref 11–15.5)
GFR SERPL CREATININE-BSD FRML MDRD: 82 ML/MIN (ref 60–?)
GLUCOSE SERPL-MCNC: 194 MG/DL (ref 70–105)
HCT VFR BLD AUTO: 35.2 % (ref 42–54)
INR PPP: 1.02 (ref 0.85–1.15)
LYMPHOCYTES NFR SPEC AUTO: 30.3 % (ref 21–51)
MCH RBC QN AUTO: 33.5 PG (ref 27–33)
MCHC RBC AUTO-ENTMCNC: 34.6 G/DL (ref 32–36)
MCV RBC AUTO: 96.9 FL (ref 79–99)
MONOCYTES NFR BLD AUTO: 11.8 % (ref 3–13)
NEUTROPHILS NFR BLD AUTO: 55.5 % (ref 40–77)
NRBC BLD MANUAL-RTO: 0 % (ref 0–0.19)
PLATELET # BLD AUTO: 135 K/UL (ref 130–400)
POTASSIUM SERPL-SCNC: 3.8 MMOL/L (ref 3.5–5.1)
PROTHROMBIN TIME: 10.7 SEC (ref 9.6–11.6)
RBC # BLD AUTO: 3.63 MIL/UL (ref 4.5–6.2)
SODIUM SERPL-SCNC: 139 MMOL/L (ref 136–145)
WBC # BLD AUTO: 5.2 K/UL (ref 4.8–10.8)

## 2018-11-03 PROCEDURE — 02HV33Z INSERTION OF INFUSION DEVICE INTO SUPERIOR VENA CAVA, PERCUTANEOUS APPROACH: ICD-10-PCS | Performed by: INTERNAL MEDICINE

## 2018-11-03 RX ADMIN — Medication SCH MG: at 10:31

## 2018-11-03 RX ADMIN — CHLORTHALIDONE SCH EACH: 50 TABLET ORAL at 09:00

## 2018-11-03 RX ADMIN — ASPIRIN TAB DELAYED RELEASE 81 MG SCH MG: 81 TABLET DELAYED RESPONSE at 10:31

## 2018-11-03 RX ADMIN — CLOPIDOGREL BISULFATE SCH MG: 75 TABLET, FILM COATED ORAL at 09:00

## 2018-11-03 RX ADMIN — SODIUM CHLORIDE SCH MLS/HR: 9 INJECTION, SOLUTION INTRAVENOUS at 10:32

## 2018-11-03 RX ADMIN — PIPERACILLIN SODIUM AND TAZOBACTAM SODIUM SCH MLS/HR: .375; 3 INJECTION, POWDER, LYOPHILIZED, FOR SOLUTION INTRAVENOUS at 04:35

## 2018-11-03 RX ADMIN — SODIUM CHLORIDE SCH MLS/HR: 0.9 INJECTION, SOLUTION INTRAVENOUS at 14:05

## 2018-11-03 RX ADMIN — SODIUM CHLORIDE SCH MLS/HR: 9 INJECTION, SOLUTION INTRAVENOUS at 17:57

## 2018-11-03 RX ADMIN — SODIUM CHLORIDE SCH UNIT: 9 INJECTION, SOLUTION INTRAVENOUS at 06:22

## 2018-11-03 RX ADMIN — ATORVASTATIN CALCIUM SCH MG: 40 TABLET, FILM COATED ORAL at 21:55

## 2018-11-03 RX ADMIN — LISINOPRIL SCH MG: 5 TABLET ORAL at 10:30

## 2018-11-03 RX ADMIN — PIPERACILLIN SODIUM AND TAZOBACTAM SODIUM SCH MLS/HR: .375; 3 INJECTION, POWDER, LYOPHILIZED, FOR SOLUTION INTRAVENOUS at 22:05

## 2018-11-03 RX ADMIN — SODIUM CHLORIDE SCH MLS/HR: 0.9 INJECTION, SOLUTION INTRAVENOUS at 01:31

## 2018-11-03 RX ADMIN — SODIUM CHLORIDE SCH UNIT: 9 INJECTION, SOLUTION INTRAVENOUS at 21:57

## 2018-11-03 RX ADMIN — SODIUM CHLORIDE SCH UNIT: 9 INJECTION, SOLUTION INTRAVENOUS at 11:30

## 2018-11-03 RX ADMIN — ENOXAPARIN SODIUM SCH MG: 30 INJECTION SUBCUTANEOUS at 09:00

## 2018-11-03 RX ADMIN — PANTOPRAZOLE SODIUM SCH MG: 40 TABLET, DELAYED RELEASE ORAL at 10:31

## 2018-11-03 RX ADMIN — SODIUM CHLORIDE SCH UNIT: 9 INJECTION, SOLUTION INTRAVENOUS at 16:52

## 2018-11-03 RX ADMIN — PIPERACILLIN SODIUM AND TAZOBACTAM SODIUM SCH MLS/HR: .375; 3 INJECTION, POWDER, LYOPHILIZED, FOR SOLUTION INTRAVENOUS at 14:36

## 2018-11-03 RX ADMIN — SODIUM CHLORIDE SCH MLS/HR: 9 INJECTION, SOLUTION INTRAVENOUS at 00:28

## 2018-11-03 RX ADMIN — ATENOLOL SCH MG: 25 TABLET ORAL at 21:55

## 2018-11-03 RX ADMIN — ATENOLOL SCH MG: 25 TABLET ORAL at 10:31

## 2018-11-04 VITALS — SYSTOLIC BLOOD PRESSURE: 172 MMHG | DIASTOLIC BLOOD PRESSURE: 100 MMHG

## 2018-11-04 VITALS — SYSTOLIC BLOOD PRESSURE: 153 MMHG | DIASTOLIC BLOOD PRESSURE: 98 MMHG

## 2018-11-04 VITALS — SYSTOLIC BLOOD PRESSURE: 143 MMHG | DIASTOLIC BLOOD PRESSURE: 70 MMHG

## 2018-11-04 VITALS — DIASTOLIC BLOOD PRESSURE: 92 MMHG | SYSTOLIC BLOOD PRESSURE: 148 MMHG

## 2018-11-04 VITALS — DIASTOLIC BLOOD PRESSURE: 100 MMHG | SYSTOLIC BLOOD PRESSURE: 171 MMHG

## 2018-11-04 VITALS — SYSTOLIC BLOOD PRESSURE: 144 MMHG | DIASTOLIC BLOOD PRESSURE: 95 MMHG

## 2018-11-04 VITALS — DIASTOLIC BLOOD PRESSURE: 83 MMHG | SYSTOLIC BLOOD PRESSURE: 130 MMHG

## 2018-11-04 LAB
APTT PPP: 24.1 SEC (ref 26.3–35.5)
BUN SERPL-MCNC: 12 MG/DL (ref 7–18)
BUN SERPL-MCNC: 14 MG/DL (ref 7–18)
CHLORIDE SERPL-SCNC: 103 MMOL/L (ref 101–111)
CHLORIDE SERPL-SCNC: 106 MMOL/L (ref 101–111)
CO2 SERPL-SCNC: 24 MMOL/L (ref 21–32)
CO2 SERPL-SCNC: 28 MMOL/L (ref 21–32)
CREAT SERPL-MCNC: 0.8 MG/DL (ref 0.5–1.5)
CREAT SERPL-MCNC: 1 MG/DL (ref 0.5–1.5)
ERYTHROCYTE [DISTWIDTH] IN BLOOD BY AUTOMATED COUNT: 15.2 % (ref 11–15.5)
ERYTHROCYTE [DISTWIDTH] IN BLOOD BY AUTOMATED COUNT: 15.5 % (ref 11–15.5)
GFR SERPL CREATININE-BSD FRML MDRD: 106 ML/MIN (ref 60–?)
GFR SERPL CREATININE-BSD FRML MDRD: 82 ML/MIN (ref 60–?)
GLUCOSE SERPL-MCNC: 105 MG/DL (ref 70–105)
GLUCOSE SERPL-MCNC: 323 MG/DL (ref 70–105)
HCT VFR BLD AUTO: 31.4 % (ref 42–54)
HCT VFR BLD AUTO: 34.6 % (ref 42–54)
INR PPP: 0.99 (ref 0.85–1.15)
MCH RBC QN AUTO: 33.5 PG (ref 27–33)
MCH RBC QN AUTO: 33.5 PG (ref 27–33)
MCHC RBC AUTO-ENTMCNC: 34.4 G/DL (ref 32–36)
MCHC RBC AUTO-ENTMCNC: 34.5 G/DL (ref 32–36)
MCV RBC AUTO: 97.2 FL (ref 79–99)
MCV RBC AUTO: 97.6 FL (ref 79–99)
NRBC BLD MANUAL-RTO: 0 % (ref 0–0.19)
NRBC BLD MANUAL-RTO: 0.1 % (ref 0–0.19)
PLATELET # BLD AUTO: 115 K/UL (ref 130–400)
PLATELET # BLD AUTO: 122 K/UL (ref 130–400)
POTASSIUM SERPL-SCNC: 3.5 MMOL/L (ref 3.5–5.1)
POTASSIUM SERPL-SCNC: 4.2 MMOL/L (ref 3.5–5.1)
PROTHROMBIN TIME: 10.4 SEC (ref 9.6–11.6)
RBC # BLD AUTO: 3.23 MIL/UL (ref 4.5–6.2)
RBC # BLD AUTO: 3.55 MIL/UL (ref 4.5–6.2)
SODIUM SERPL-SCNC: 138 MMOL/L (ref 136–145)
SODIUM SERPL-SCNC: 142 MMOL/L (ref 136–145)
WBC # BLD AUTO: 5.2 K/UL (ref 4.8–10.8)
WBC # BLD AUTO: 5.2 K/UL (ref 4.8–10.8)

## 2018-11-04 RX ADMIN — ATORVASTATIN CALCIUM SCH MG: 40 TABLET, FILM COATED ORAL at 20:03

## 2018-11-04 RX ADMIN — HYDRALAZINE HYDROCHLORIDE PRN MG: 20 INJECTION, SOLUTION INTRAMUSCULAR; INTRAVENOUS at 13:35

## 2018-11-04 RX ADMIN — SODIUM CHLORIDE SCH UNIT: 9 INJECTION, SOLUTION INTRAVENOUS at 13:37

## 2018-11-04 RX ADMIN — ASPIRIN TAB DELAYED RELEASE 81 MG SCH MG: 81 TABLET DELAYED RESPONSE at 09:02

## 2018-11-04 RX ADMIN — LISINOPRIL SCH MG: 5 TABLET ORAL at 09:02

## 2018-11-04 RX ADMIN — SODIUM CHLORIDE SCH MLS/HR: 0.9 INJECTION, SOLUTION INTRAVENOUS at 06:07

## 2018-11-04 RX ADMIN — SODIUM CHLORIDE SCH MLS/HR: 0.9 INJECTION, SOLUTION INTRAVENOUS at 15:02

## 2018-11-04 RX ADMIN — SODIUM CHLORIDE SCH UNIT: 9 INJECTION, SOLUTION INTRAVENOUS at 22:47

## 2018-11-04 RX ADMIN — SODIUM CHLORIDE SCH MLS/HR: 9 INJECTION, SOLUTION INTRAVENOUS at 16:56

## 2018-11-04 RX ADMIN — ATENOLOL SCH MG: 25 TABLET ORAL at 09:02

## 2018-11-04 RX ADMIN — ATENOLOL SCH MG: 25 TABLET ORAL at 20:03

## 2018-11-04 RX ADMIN — PIPERACILLIN SODIUM AND TAZOBACTAM SODIUM SCH MLS/HR: .375; 3 INJECTION, POWDER, LYOPHILIZED, FOR SOLUTION INTRAVENOUS at 22:46

## 2018-11-04 RX ADMIN — SODIUM CHLORIDE SCH MLS/HR: 0.9 INJECTION, SOLUTION INTRAVENOUS at 23:25

## 2018-11-04 RX ADMIN — CLOPIDOGREL BISULFATE SCH MG: 75 TABLET, FILM COATED ORAL at 09:03

## 2018-11-04 RX ADMIN — SODIUM CHLORIDE SCH UNIT: 9 INJECTION, SOLUTION INTRAVENOUS at 16:56

## 2018-11-04 RX ADMIN — ENOXAPARIN SODIUM SCH MG: 30 INJECTION SUBCUTANEOUS at 09:03

## 2018-11-04 RX ADMIN — PIPERACILLIN SODIUM AND TAZOBACTAM SODIUM SCH MLS/HR: .375; 3 INJECTION, POWDER, LYOPHILIZED, FOR SOLUTION INTRAVENOUS at 06:07

## 2018-11-04 RX ADMIN — CHLORTHALIDONE SCH EACH: 50 TABLET ORAL at 09:00

## 2018-11-04 RX ADMIN — SODIUM CHLORIDE SCH UNIT: 9 INJECTION, SOLUTION INTRAVENOUS at 06:11

## 2018-11-04 RX ADMIN — PANTOPRAZOLE SODIUM SCH MG: 40 TABLET, DELAYED RELEASE ORAL at 09:02

## 2018-11-04 RX ADMIN — SODIUM CHLORIDE SCH MLS/HR: 9 INJECTION, SOLUTION INTRAVENOUS at 02:24

## 2018-11-04 RX ADMIN — PIPERACILLIN SODIUM AND TAZOBACTAM SODIUM SCH MLS/HR: .375; 3 INJECTION, POWDER, LYOPHILIZED, FOR SOLUTION INTRAVENOUS at 13:35

## 2018-11-04 RX ADMIN — SODIUM CHLORIDE SCH MLS/HR: 9 INJECTION, SOLUTION INTRAVENOUS at 09:02

## 2018-11-04 RX ADMIN — Medication SCH MG: at 09:03

## 2018-11-05 VITALS — SYSTOLIC BLOOD PRESSURE: 180 MMHG | DIASTOLIC BLOOD PRESSURE: 85 MMHG

## 2018-11-05 VITALS — SYSTOLIC BLOOD PRESSURE: 170 MMHG | DIASTOLIC BLOOD PRESSURE: 95 MMHG

## 2018-11-05 VITALS — SYSTOLIC BLOOD PRESSURE: 169 MMHG | DIASTOLIC BLOOD PRESSURE: 85 MMHG

## 2018-11-05 VITALS — DIASTOLIC BLOOD PRESSURE: 86 MMHG | SYSTOLIC BLOOD PRESSURE: 168 MMHG

## 2018-11-05 VITALS — SYSTOLIC BLOOD PRESSURE: 120 MMHG | DIASTOLIC BLOOD PRESSURE: 76 MMHG

## 2018-11-05 VITALS — DIASTOLIC BLOOD PRESSURE: 86 MMHG | SYSTOLIC BLOOD PRESSURE: 145 MMHG

## 2018-11-05 VITALS — DIASTOLIC BLOOD PRESSURE: 91 MMHG | SYSTOLIC BLOOD PRESSURE: 156 MMHG

## 2018-11-05 VITALS — DIASTOLIC BLOOD PRESSURE: 76 MMHG | SYSTOLIC BLOOD PRESSURE: 158 MMHG

## 2018-11-05 VITALS — DIASTOLIC BLOOD PRESSURE: 86 MMHG | SYSTOLIC BLOOD PRESSURE: 140 MMHG

## 2018-11-05 VITALS — SYSTOLIC BLOOD PRESSURE: 161 MMHG | DIASTOLIC BLOOD PRESSURE: 89 MMHG

## 2018-11-05 VITALS — DIASTOLIC BLOOD PRESSURE: 86 MMHG | SYSTOLIC BLOOD PRESSURE: 165 MMHG

## 2018-11-05 LAB
BUN SERPL-MCNC: 12 MG/DL (ref 7–18)
CHLORIDE SERPL-SCNC: 107 MMOL/L (ref 101–111)
CHOLEST SERPL-MCNC: 93 MG/DL (ref ?–200)
CO2 SERPL-SCNC: 24 MMOL/L (ref 21–32)
CREAT SERPL-MCNC: 0.9 MG/DL (ref 0.5–1.5)
ERYTHROCYTE [DISTWIDTH] IN BLOOD BY AUTOMATED COUNT: 15 % (ref 11–15.5)
GFR SERPL CREATININE-BSD FRML MDRD: 92 ML/MIN (ref 60–?)
GLUCOSE SERPL-MCNC: 74 MG/DL (ref 70–105)
HCT VFR BLD AUTO: 31.7 % (ref 42–54)
HDLC SERPL-MCNC: 35 MG/DL (ref 29–71)
LDLC SERPL CALC-MCNC: 53 MG/DL (ref 0–99)
MCH RBC QN AUTO: 33.3 PG (ref 27–33)
MCHC RBC AUTO-ENTMCNC: 34.6 G/DL (ref 32–36)
MCV RBC AUTO: 96.4 FL (ref 79–99)
NRBC BLD MANUAL-RTO: 0 % (ref 0–0.19)
PLATELET # BLD AUTO: 113 K/UL (ref 130–400)
POTASSIUM SERPL-SCNC: 3.5 MMOL/L (ref 3.5–5.1)
RBC # BLD AUTO: 3.29 MIL/UL (ref 4.5–6.2)
SODIUM SERPL-SCNC: 141 MMOL/L (ref 136–145)
TRIGL SERPL-MCNC: 43 MG/DL (ref 30–200)
WBC # BLD AUTO: 4.8 K/UL (ref 4.8–10.8)

## 2018-11-05 PROCEDURE — B41G1ZZ FLUOROSCOPY OF LEFT LOWER EXTREMITY ARTERIES USING LOW OSMOLAR CONTRAST: ICD-10-PCS | Performed by: INTERNAL MEDICINE

## 2018-11-05 PROCEDURE — 047S37Z DILATION OF LEFT POSTERIOR TIBIAL ARTERY WITH FOUR OR MORE DRUG-ELUTING INTRALUMINAL DEVICES, PERCUTANEOUS APPROACH: ICD-10-PCS | Performed by: INTERNAL MEDICINE

## 2018-11-05 RX ADMIN — SODIUM CHLORIDE SCH UNIT: 9 INJECTION, SOLUTION INTRAVENOUS at 05:14

## 2018-11-05 RX ADMIN — SODIUM CHLORIDE SCH MLS/HR: 0.9 INJECTION, SOLUTION INTRAVENOUS at 22:25

## 2018-11-05 RX ADMIN — CHLORTHALIDONE SCH EACH: 50 TABLET ORAL at 09:00

## 2018-11-05 RX ADMIN — SODIUM CHLORIDE SCH MLS/HR: 0.9 INJECTION, SOLUTION INTRAVENOUS at 00:09

## 2018-11-05 RX ADMIN — LISINOPRIL SCH MG: 5 TABLET ORAL at 09:00

## 2018-11-05 RX ADMIN — SODIUM CHLORIDE SCH MLS/HR: 9 INJECTION, SOLUTION INTRAVENOUS at 01:55

## 2018-11-05 RX ADMIN — PIPERACILLIN SODIUM AND TAZOBACTAM SODIUM SCH MLS/HR: .375; 3 INJECTION, POWDER, LYOPHILIZED, FOR SOLUTION INTRAVENOUS at 22:25

## 2018-11-05 RX ADMIN — PIPERACILLIN SODIUM AND TAZOBACTAM SODIUM SCH MLS/HR: .375; 3 INJECTION, POWDER, LYOPHILIZED, FOR SOLUTION INTRAVENOUS at 15:24

## 2018-11-05 RX ADMIN — ATORVASTATIN CALCIUM SCH MG: 40 TABLET, FILM COATED ORAL at 22:26

## 2018-11-05 RX ADMIN — SODIUM CHLORIDE SCH MLS/HR: 9 INJECTION, SOLUTION INTRAVENOUS at 17:39

## 2018-11-05 RX ADMIN — ATENOLOL SCH MG: 25 TABLET ORAL at 09:00

## 2018-11-05 RX ADMIN — SODIUM CHLORIDE SCH UNIT: 9 INJECTION, SOLUTION INTRAVENOUS at 22:21

## 2018-11-05 RX ADMIN — SODIUM CHLORIDE SCH UNIT: 9 INJECTION, SOLUTION INTRAVENOUS at 16:30

## 2018-11-05 RX ADMIN — SODIUM CHLORIDE SCH MLS/HR: 9 INJECTION, SOLUTION INTRAVENOUS at 10:00

## 2018-11-05 RX ADMIN — HYDRALAZINE HYDROCHLORIDE PRN MG: 20 INJECTION, SOLUTION INTRAMUSCULAR; INTRAVENOUS at 13:42

## 2018-11-05 RX ADMIN — SODIUM CHLORIDE SCH MLS/HR: 0.9 INJECTION, SOLUTION INTRAVENOUS at 11:02

## 2018-11-05 RX ADMIN — Medication SCH MG: at 13:04

## 2018-11-05 RX ADMIN — ENOXAPARIN SODIUM SCH MG: 30 INJECTION SUBCUTANEOUS at 09:00

## 2018-11-05 RX ADMIN — SODIUM CHLORIDE SCH UNIT: 9 INJECTION, SOLUTION INTRAVENOUS at 11:30

## 2018-11-05 RX ADMIN — PIPERACILLIN SODIUM AND TAZOBACTAM SODIUM SCH MLS/HR: .375; 3 INJECTION, POWDER, LYOPHILIZED, FOR SOLUTION INTRAVENOUS at 05:21

## 2018-11-05 RX ADMIN — SODIUM CHLORIDE SCH MLS/HR: 0.9 INJECTION, SOLUTION INTRAVENOUS at 16:05

## 2018-11-05 RX ADMIN — ATENOLOL SCH MG: 25 TABLET ORAL at 22:26

## 2018-11-06 VITALS — SYSTOLIC BLOOD PRESSURE: 155 MMHG | DIASTOLIC BLOOD PRESSURE: 88 MMHG

## 2018-11-06 VITALS — SYSTOLIC BLOOD PRESSURE: 154 MMHG | DIASTOLIC BLOOD PRESSURE: 91 MMHG

## 2018-11-06 VITALS — SYSTOLIC BLOOD PRESSURE: 113 MMHG | DIASTOLIC BLOOD PRESSURE: 66 MMHG

## 2018-11-06 VITALS — DIASTOLIC BLOOD PRESSURE: 47 MMHG | SYSTOLIC BLOOD PRESSURE: 98 MMHG

## 2018-11-06 VITALS — SYSTOLIC BLOOD PRESSURE: 141 MMHG | DIASTOLIC BLOOD PRESSURE: 79 MMHG

## 2018-11-06 VITALS — DIASTOLIC BLOOD PRESSURE: 69 MMHG | SYSTOLIC BLOOD PRESSURE: 117 MMHG

## 2018-11-06 VITALS — SYSTOLIC BLOOD PRESSURE: 152 MMHG | DIASTOLIC BLOOD PRESSURE: 86 MMHG

## 2018-11-06 LAB
ALBUMIN SERPL-MCNC: 2.5 G/DL (ref 3.5–5)
ALT SERPL-CCNC: 29 U/L (ref 12–78)
AST SERPL-CCNC: 23 U/L (ref 10–37)
BASOPHILS NFR BLD AUTO: 0.8 % (ref 0–5)
BILIRUB SERPL-MCNC: 1.2 MG/DL (ref 0.2–1)
BUN SERPL-MCNC: 13 MG/DL (ref 7–18)
CHLORIDE SERPL-SCNC: 107 MMOL/L (ref 101–111)
CHOLEST SERPL-MCNC: 82 MG/DL (ref ?–200)
CO2 SERPL-SCNC: 24 MMOL/L (ref 21–32)
CREAT SERPL-MCNC: 1 MG/DL (ref 0.5–1.5)
EOSINOPHIL NFR BLD AUTO: 1.9 % (ref 0–8)
ERYTHROCYTE [DISTWIDTH] IN BLOOD BY AUTOMATED COUNT: 15.5 % (ref 11–15.5)
GFR SERPL CREATININE-BSD FRML MDRD: 82 ML/MIN (ref 60–?)
GLUCOSE SERPL-MCNC: 207 MG/DL (ref 70–105)
HCT VFR BLD AUTO: 28.1 % (ref 42–54)
HDLC SERPL-MCNC: 31 MG/DL (ref 29–71)
LDLC SERPL CALC-MCNC: 48 MG/DL (ref 0–99)
LYMPHOCYTES NFR SPEC AUTO: 30.2 % (ref 21–51)
MCH RBC QN AUTO: 33.2 PG (ref 27–33)
MCHC RBC AUTO-ENTMCNC: 34.5 G/DL (ref 32–36)
MCV RBC AUTO: 96.3 FL (ref 79–99)
MONOCYTES NFR BLD AUTO: 15.6 % (ref 3–13)
NEUTROPHILS NFR BLD AUTO: 51.5 % (ref 40–77)
NRBC BLD MANUAL-RTO: 0 % (ref 0–0.19)
PLATELET # BLD AUTO: 107 K/UL (ref 130–400)
POTASSIUM SERPL-SCNC: 3.4 MMOL/L (ref 3.5–5.1)
PROT SERPL-MCNC: 5.9 G/DL (ref 6–8.3)
RBC # BLD AUTO: 2.92 MIL/UL (ref 4.5–6.2)
SODIUM SERPL-SCNC: 140 MMOL/L (ref 136–145)
TRIGL SERPL-MCNC: 61 MG/DL (ref 30–200)
WBC # BLD AUTO: 4.3 K/UL (ref 4.8–10.8)

## 2018-11-06 RX ADMIN — SODIUM CHLORIDE SCH MLS/HR: 9 INJECTION, SOLUTION INTRAVENOUS at 02:27

## 2018-11-06 RX ADMIN — SODIUM CHLORIDE SCH UNIT: 9 INJECTION, SOLUTION INTRAVENOUS at 06:47

## 2018-11-06 RX ADMIN — SODIUM CHLORIDE SCH UNIT: 9 INJECTION, SOLUTION INTRAVENOUS at 16:30

## 2018-11-06 RX ADMIN — ATENOLOL SCH MG: 25 TABLET ORAL at 21:52

## 2018-11-06 RX ADMIN — PIPERACILLIN SODIUM AND TAZOBACTAM SODIUM SCH MLS/HR: .375; 3 INJECTION, POWDER, LYOPHILIZED, FOR SOLUTION INTRAVENOUS at 14:16

## 2018-11-06 RX ADMIN — IPRATROPIUM BROMIDE AND ALBUTEROL SULFATE SCH UDVIAL: .5; 3 SOLUTION RESPIRATORY (INHALATION) at 23:18

## 2018-11-06 RX ADMIN — SODIUM CHLORIDE SCH MLS/HR: 9 INJECTION, SOLUTION INTRAVENOUS at 18:06

## 2018-11-06 RX ADMIN — LEVOFLOXACIN SCH MG: 500 TABLET, FILM COATED ORAL at 12:20

## 2018-11-06 RX ADMIN — Medication SCH MG: at 10:38

## 2018-11-06 RX ADMIN — ATENOLOL SCH MG: 25 TABLET ORAL at 10:38

## 2018-11-06 RX ADMIN — IPRATROPIUM BROMIDE AND ALBUTEROL SULFATE SCH UDVIAL: .5; 3 SOLUTION RESPIRATORY (INHALATION) at 13:27

## 2018-11-06 RX ADMIN — PIPERACILLIN SODIUM AND TAZOBACTAM SODIUM SCH MLS/HR: .375; 3 INJECTION, POWDER, LYOPHILIZED, FOR SOLUTION INTRAVENOUS at 21:51

## 2018-11-06 RX ADMIN — PANTOPRAZOLE SODIUM SCH MG: 40 TABLET, DELAYED RELEASE ORAL at 10:39

## 2018-11-06 RX ADMIN — SODIUM CHLORIDE SCH MLS/HR: 0.9 INJECTION, SOLUTION INTRAVENOUS at 08:45

## 2018-11-06 RX ADMIN — SODIUM CHLORIDE SCH UNIT: 9 INJECTION, SOLUTION INTRAVENOUS at 21:47

## 2018-11-06 RX ADMIN — ENOXAPARIN SODIUM SCH MG: 30 INJECTION SUBCUTANEOUS at 09:00

## 2018-11-06 RX ADMIN — ASPIRIN SCH MG: 81 TABLET, CHEWABLE ORAL at 10:38

## 2018-11-06 RX ADMIN — CLOPIDOGREL BISULFATE SCH MG: 75 TABLET, FILM COATED ORAL at 10:38

## 2018-11-06 RX ADMIN — ATORVASTATIN CALCIUM SCH MG: 40 TABLET, FILM COATED ORAL at 21:51

## 2018-11-06 RX ADMIN — SODIUM CHLORIDE SCH UNIT: 9 INJECTION, SOLUTION INTRAVENOUS at 12:28

## 2018-11-06 RX ADMIN — LISINOPRIL SCH MG: 5 TABLET ORAL at 10:38

## 2018-11-06 RX ADMIN — SODIUM CHLORIDE SCH MLS/HR: 0.9 INJECTION, SOLUTION INTRAVENOUS at 18:06

## 2018-11-06 RX ADMIN — SODIUM CHLORIDE SCH MLS/HR: 9 INJECTION, SOLUTION INTRAVENOUS at 10:40

## 2018-11-06 RX ADMIN — CHLORTHALIDONE SCH EACH: 50 TABLET ORAL at 09:00

## 2018-11-06 RX ADMIN — SODIUM CHLORIDE SCH MLS/HR: 0.9 INJECTION, SOLUTION INTRAVENOUS at 10:39

## 2018-11-06 RX ADMIN — PIPERACILLIN SODIUM AND TAZOBACTAM SODIUM SCH MLS/HR: .375; 3 INJECTION, POWDER, LYOPHILIZED, FOR SOLUTION INTRAVENOUS at 06:47

## 2018-11-06 RX ADMIN — IPRATROPIUM BROMIDE AND ALBUTEROL SULFATE SCH UDVIAL: .5; 3 SOLUTION RESPIRATORY (INHALATION) at 18:03

## 2018-11-07 VITALS — DIASTOLIC BLOOD PRESSURE: 71 MMHG | SYSTOLIC BLOOD PRESSURE: 104 MMHG

## 2018-11-07 VITALS — DIASTOLIC BLOOD PRESSURE: 98 MMHG | SYSTOLIC BLOOD PRESSURE: 166 MMHG

## 2018-11-07 VITALS — SYSTOLIC BLOOD PRESSURE: 133 MMHG | DIASTOLIC BLOOD PRESSURE: 84 MMHG

## 2018-11-07 VITALS — DIASTOLIC BLOOD PRESSURE: 89 MMHG | SYSTOLIC BLOOD PRESSURE: 136 MMHG

## 2018-11-07 VITALS — DIASTOLIC BLOOD PRESSURE: 91 MMHG | SYSTOLIC BLOOD PRESSURE: 162 MMHG

## 2018-11-07 VITALS — DIASTOLIC BLOOD PRESSURE: 76 MMHG | SYSTOLIC BLOOD PRESSURE: 125 MMHG

## 2018-11-07 VITALS — SYSTOLIC BLOOD PRESSURE: 104 MMHG | DIASTOLIC BLOOD PRESSURE: 71 MMHG

## 2018-11-07 VITALS — DIASTOLIC BLOOD PRESSURE: 71 MMHG | SYSTOLIC BLOOD PRESSURE: 164 MMHG

## 2018-11-07 LAB
ALBUMIN SERPL-MCNC: 2.8 G/DL (ref 3.5–5)
ALT SERPL-CCNC: 29 U/L (ref 12–78)
AST SERPL-CCNC: 26 U/L (ref 10–37)
BILIRUB SERPL-MCNC: 0.9 MG/DL (ref 0.2–1)
BUN SERPL-MCNC: 11 MG/DL (ref 7–18)
CHLORIDE SERPL-SCNC: 105 MMOL/L (ref 101–111)
CO2 SERPL-SCNC: 25 MMOL/L (ref 21–32)
CREAT SERPL-MCNC: 0.8 MG/DL (ref 0.5–1.5)
ERYTHROCYTE [DISTWIDTH] IN BLOOD BY AUTOMATED COUNT: 15.3 % (ref 11–15.5)
GFR SERPL CREATININE-BSD FRML MDRD: 106 ML/MIN (ref 60–?)
GLUCOSE SERPL-MCNC: 206 MG/DL (ref 70–105)
HCT VFR BLD AUTO: 28.1 % (ref 42–54)
MCH RBC QN AUTO: 33.3 PG (ref 27–33)
MCHC RBC AUTO-ENTMCNC: 34.4 G/DL (ref 32–36)
MCV RBC AUTO: 97 FL (ref 79–99)
NRBC BLD MANUAL-RTO: 0 % (ref 0–0.19)
PLATELET # BLD AUTO: 102 K/UL (ref 130–400)
POTASSIUM SERPL-SCNC: 3.2 MMOL/L (ref 3.5–5.1)
PROT SERPL-MCNC: 6.4 G/DL (ref 6–8.3)
RBC # BLD AUTO: 2.89 MIL/UL (ref 4.5–6.2)
SODIUM SERPL-SCNC: 139 MMOL/L (ref 136–145)
WBC # BLD AUTO: 4.5 K/UL (ref 4.8–10.8)

## 2018-11-07 RX ADMIN — PANTOPRAZOLE SODIUM SCH MG: 40 TABLET, DELAYED RELEASE ORAL at 11:53

## 2018-11-07 RX ADMIN — ATENOLOL SCH MG: 25 TABLET ORAL at 11:53

## 2018-11-07 RX ADMIN — HYDRALAZINE HYDROCHLORIDE PRN MG: 20 INJECTION, SOLUTION INTRAMUSCULAR; INTRAVENOUS at 04:09

## 2018-11-07 RX ADMIN — PIPERACILLIN SODIUM AND TAZOBACTAM SODIUM SCH MLS/HR: .375; 3 INJECTION, POWDER, LYOPHILIZED, FOR SOLUTION INTRAVENOUS at 14:00

## 2018-11-07 RX ADMIN — IPRATROPIUM BROMIDE AND ALBUTEROL SULFATE SCH UDVIAL: .5; 3 SOLUTION RESPIRATORY (INHALATION) at 18:49

## 2018-11-07 RX ADMIN — ATENOLOL SCH MG: 25 TABLET ORAL at 23:45

## 2018-11-07 RX ADMIN — IPRATROPIUM BROMIDE AND ALBUTEROL SULFATE SCH UDVIAL: .5; 3 SOLUTION RESPIRATORY (INHALATION) at 11:38

## 2018-11-07 RX ADMIN — IPRATROPIUM BROMIDE AND ALBUTEROL SULFATE SCH UDVIAL: .5; 3 SOLUTION RESPIRATORY (INHALATION) at 06:56

## 2018-11-07 RX ADMIN — SODIUM CHLORIDE SCH MLS/HR: 9 INJECTION, SOLUTION INTRAVENOUS at 12:08

## 2018-11-07 RX ADMIN — LEVOFLOXACIN SCH MG: 500 TABLET, FILM COATED ORAL at 11:55

## 2018-11-07 RX ADMIN — CHLORTHALIDONE SCH EACH: 50 TABLET ORAL at 09:00

## 2018-11-07 RX ADMIN — LISINOPRIL SCH MG: 5 TABLET ORAL at 11:52

## 2018-11-07 RX ADMIN — ASPIRIN SCH MG: 81 TABLET, CHEWABLE ORAL at 11:52

## 2018-11-07 RX ADMIN — SODIUM CHLORIDE SCH MLS/HR: 9 INJECTION, SOLUTION INTRAVENOUS at 01:07

## 2018-11-07 RX ADMIN — SODIUM CHLORIDE SCH MLS/HR: 0.9 INJECTION, SOLUTION INTRAVENOUS at 03:17

## 2018-11-07 RX ADMIN — PIPERACILLIN SODIUM AND TAZOBACTAM SODIUM SCH MLS/HR: .375; 3 INJECTION, POWDER, LYOPHILIZED, FOR SOLUTION INTRAVENOUS at 23:35

## 2018-11-07 RX ADMIN — Medication SCH MG: at 09:00

## 2018-11-07 RX ADMIN — IPRATROPIUM BROMIDE AND ALBUTEROL SULFATE SCH UDVIAL: .5; 3 SOLUTION RESPIRATORY (INHALATION) at 23:28

## 2018-11-07 RX ADMIN — PIPERACILLIN SODIUM AND TAZOBACTAM SODIUM SCH MLS/HR: .375; 3 INJECTION, POWDER, LYOPHILIZED, FOR SOLUTION INTRAVENOUS at 06:08

## 2018-11-07 RX ADMIN — ATORVASTATIN CALCIUM SCH MG: 40 TABLET, FILM COATED ORAL at 23:45

## 2018-11-07 RX ADMIN — SODIUM CHLORIDE SCH UNIT: 9 INJECTION, SOLUTION INTRAVENOUS at 18:20

## 2018-11-07 RX ADMIN — SODIUM CHLORIDE SCH UNIT: 9 INJECTION, SOLUTION INTRAVENOUS at 23:32

## 2018-11-07 RX ADMIN — SODIUM CHLORIDE SCH UNIT: 9 INJECTION, SOLUTION INTRAVENOUS at 12:18

## 2018-11-07 RX ADMIN — SODIUM CHLORIDE SCH MLS/HR: 9 INJECTION, SOLUTION INTRAVENOUS at 18:14

## 2018-11-07 RX ADMIN — SODIUM CHLORIDE SCH UNIT: 9 INJECTION, SOLUTION INTRAVENOUS at 07:51

## 2018-11-07 RX ADMIN — CLOPIDOGREL BISULFATE SCH MG: 75 TABLET, FILM COATED ORAL at 11:53

## 2018-11-08 VITALS — SYSTOLIC BLOOD PRESSURE: 161 MMHG | DIASTOLIC BLOOD PRESSURE: 93 MMHG

## 2018-11-08 VITALS — SYSTOLIC BLOOD PRESSURE: 140 MMHG | DIASTOLIC BLOOD PRESSURE: 89 MMHG

## 2018-11-08 VITALS — SYSTOLIC BLOOD PRESSURE: 141 MMHG | DIASTOLIC BLOOD PRESSURE: 82 MMHG

## 2018-11-08 VITALS — DIASTOLIC BLOOD PRESSURE: 80 MMHG | SYSTOLIC BLOOD PRESSURE: 124 MMHG

## 2018-11-08 VITALS — DIASTOLIC BLOOD PRESSURE: 82 MMHG | SYSTOLIC BLOOD PRESSURE: 138 MMHG

## 2018-11-08 VITALS — SYSTOLIC BLOOD PRESSURE: 152 MMHG | DIASTOLIC BLOOD PRESSURE: 87 MMHG

## 2018-11-08 LAB
BASOPHILS NFR BLD AUTO: 0.7 % (ref 0–5)
BUN SERPL-MCNC: 14 MG/DL (ref 7–18)
CHLORIDE SERPL-SCNC: 105 MMOL/L (ref 101–111)
CO2 SERPL-SCNC: 26 MMOL/L (ref 21–32)
CREAT SERPL-MCNC: 1 MG/DL (ref 0.5–1.5)
EOSINOPHIL NFR BLD AUTO: 2 % (ref 0–8)
ERYTHROCYTE [DISTWIDTH] IN BLOOD BY AUTOMATED COUNT: 15.4 % (ref 11–15.5)
GFR SERPL CREATININE-BSD FRML MDRD: 82 ML/MIN (ref 60–?)
GLUCOSE SERPL-MCNC: 128 MG/DL (ref 70–105)
HCT VFR BLD AUTO: 29.6 % (ref 42–54)
LYMPHOCYTES NFR SPEC AUTO: 25.6 % (ref 21–51)
MCH RBC QN AUTO: 32.7 PG (ref 27–33)
MCHC RBC AUTO-ENTMCNC: 34.2 G/DL (ref 32–36)
MCV RBC AUTO: 95.6 FL (ref 79–99)
MONOCYTES NFR BLD AUTO: 16.5 % (ref 3–13)
NEUTROPHILS NFR BLD AUTO: 55.2 % (ref 40–77)
NRBC BLD MANUAL-RTO: 0 % (ref 0–0.19)
PLATELET # BLD AUTO: 95 K/UL (ref 130–400)
POTASSIUM SERPL-SCNC: 3.2 MMOL/L (ref 3.5–5.1)
RBC # BLD AUTO: 3.1 MIL/UL (ref 4.5–6.2)
SODIUM SERPL-SCNC: 140 MMOL/L (ref 136–145)
WBC # BLD AUTO: 4.6 K/UL (ref 4.8–10.8)

## 2018-11-08 RX ADMIN — ATENOLOL SCH MG: 25 TABLET ORAL at 11:02

## 2018-11-08 RX ADMIN — SODIUM CHLORIDE SCH UNIT: 9 INJECTION, SOLUTION INTRAVENOUS at 06:40

## 2018-11-08 RX ADMIN — LISINOPRIL SCH MG: 5 TABLET ORAL at 11:04

## 2018-11-08 RX ADMIN — LEVOFLOXACIN SCH MG: 500 TABLET, FILM COATED ORAL at 11:01

## 2018-11-08 RX ADMIN — SODIUM CHLORIDE SCH UNIT: 9 INJECTION, SOLUTION INTRAVENOUS at 14:03

## 2018-11-08 RX ADMIN — ASPIRIN SCH MG: 81 TABLET, CHEWABLE ORAL at 11:04

## 2018-11-08 RX ADMIN — SODIUM CHLORIDE SCH UNIT: 9 INJECTION, SOLUTION INTRAVENOUS at 18:33

## 2018-11-08 RX ADMIN — SODIUM CHLORIDE SCH MLS/HR: 9 INJECTION, SOLUTION INTRAVENOUS at 11:05

## 2018-11-08 RX ADMIN — SODIUM CHLORIDE SCH UNIT: 9 INJECTION, SOLUTION INTRAVENOUS at 22:30

## 2018-11-08 RX ADMIN — Medication SCH MG: at 09:00

## 2018-11-08 RX ADMIN — PANTOPRAZOLE SODIUM SCH MG: 40 TABLET, DELAYED RELEASE ORAL at 11:04

## 2018-11-08 RX ADMIN — IPRATROPIUM BROMIDE AND ALBUTEROL SULFATE SCH UDVIAL: .5; 3 SOLUTION RESPIRATORY (INHALATION) at 23:34

## 2018-11-08 RX ADMIN — IPRATROPIUM BROMIDE AND ALBUTEROL SULFATE SCH UDVIAL: .5; 3 SOLUTION RESPIRATORY (INHALATION) at 11:42

## 2018-11-08 RX ADMIN — POTASSIUM CHLORIDE PRN MEQ: 1500 TABLET, EXTENDED RELEASE ORAL at 11:04

## 2018-11-08 RX ADMIN — ATORVASTATIN CALCIUM SCH MG: 40 TABLET, FILM COATED ORAL at 22:36

## 2018-11-08 RX ADMIN — CHLORTHALIDONE SCH EACH: 50 TABLET ORAL at 09:00

## 2018-11-08 RX ADMIN — PIPERACILLIN SODIUM AND TAZOBACTAM SODIUM SCH MLS/HR: .375; 3 INJECTION, POWDER, LYOPHILIZED, FOR SOLUTION INTRAVENOUS at 22:37

## 2018-11-08 RX ADMIN — IPRATROPIUM BROMIDE AND ALBUTEROL SULFATE SCH UDVIAL: .5; 3 SOLUTION RESPIRATORY (INHALATION) at 18:50

## 2018-11-08 RX ADMIN — CLOPIDOGREL BISULFATE SCH MG: 75 TABLET, FILM COATED ORAL at 11:01

## 2018-11-08 RX ADMIN — IPRATROPIUM BROMIDE AND ALBUTEROL SULFATE SCH UDVIAL: .5; 3 SOLUTION RESPIRATORY (INHALATION) at 06:39

## 2018-11-08 RX ADMIN — PIPERACILLIN SODIUM AND TAZOBACTAM SODIUM SCH MLS/HR: .375; 3 INJECTION, POWDER, LYOPHILIZED, FOR SOLUTION INTRAVENOUS at 06:49

## 2018-11-08 RX ADMIN — SODIUM CHLORIDE SCH MLS/HR: 9 INJECTION, SOLUTION INTRAVENOUS at 02:26

## 2018-11-08 RX ADMIN — ATENOLOL SCH MG: 25 TABLET ORAL at 22:36

## 2018-11-08 RX ADMIN — PIPERACILLIN SODIUM AND TAZOBACTAM SODIUM SCH MLS/HR: .375; 3 INJECTION, POWDER, LYOPHILIZED, FOR SOLUTION INTRAVENOUS at 14:03

## 2018-11-08 RX ADMIN — VANCOMYCIN HYDROCHLORIDE SCH MLS/HR: 1 INJECTION, POWDER, LYOPHILIZED, FOR SOLUTION INTRAVENOUS at 22:36

## 2018-11-09 VITALS — SYSTOLIC BLOOD PRESSURE: 148 MMHG | DIASTOLIC BLOOD PRESSURE: 83 MMHG

## 2018-11-09 VITALS — SYSTOLIC BLOOD PRESSURE: 135 MMHG | DIASTOLIC BLOOD PRESSURE: 77 MMHG

## 2018-11-09 VITALS — SYSTOLIC BLOOD PRESSURE: 157 MMHG | DIASTOLIC BLOOD PRESSURE: 91 MMHG

## 2018-11-09 VITALS — DIASTOLIC BLOOD PRESSURE: 80 MMHG | SYSTOLIC BLOOD PRESSURE: 128 MMHG

## 2018-11-09 VITALS — DIASTOLIC BLOOD PRESSURE: 91 MMHG | SYSTOLIC BLOOD PRESSURE: 156 MMHG

## 2018-11-09 LAB
BASOPHILS NFR BLD AUTO: 0.6 % (ref 0–5)
BUN SERPL-MCNC: 16 MG/DL (ref 7–18)
CHLORIDE SERPL-SCNC: 106 MMOL/L (ref 101–111)
CO2 SERPL-SCNC: 28 MMOL/L (ref 21–32)
CREAT SERPL-MCNC: 0.8 MG/DL (ref 0.5–1.5)
EOSINOPHIL NFR BLD AUTO: 2.8 % (ref 0–8)
ERYTHROCYTE [DISTWIDTH] IN BLOOD BY AUTOMATED COUNT: 15.8 % (ref 11–15.5)
GFR SERPL CREATININE-BSD FRML MDRD: 106 ML/MIN (ref 60–?)
GLUCOSE SERPL-MCNC: 174 MG/DL (ref 70–105)
HCT VFR BLD AUTO: 31.4 % (ref 42–54)
LYMPHOCYTES NFR SPEC AUTO: 29.4 % (ref 21–51)
MCH RBC QN AUTO: 32.3 PG (ref 27–33)
MCHC RBC AUTO-ENTMCNC: 33.1 G/DL (ref 32–36)
MCV RBC AUTO: 97.7 FL (ref 79–99)
MONOCYTES NFR BLD AUTO: 13.8 % (ref 3–13)
NEUTROPHILS NFR BLD AUTO: 53.4 % (ref 40–77)
NRBC BLD MANUAL-RTO: 0 % (ref 0–0.19)
PLATELET # BLD AUTO: 101 K/UL (ref 130–400)
POTASSIUM SERPL-SCNC: 3.2 MMOL/L (ref 3.5–5.1)
RBC # BLD AUTO: 3.22 MIL/UL (ref 4.5–6.2)
SODIUM SERPL-SCNC: 141 MMOL/L (ref 136–145)
WBC # BLD AUTO: 4.9 K/UL (ref 4.8–10.8)

## 2018-11-09 RX ADMIN — POTASSIUM CHLORIDE PRN MEQ: 1500 TABLET, EXTENDED RELEASE ORAL at 09:54

## 2018-11-09 RX ADMIN — ASPIRIN SCH MG: 81 TABLET, CHEWABLE ORAL at 09:55

## 2018-11-09 RX ADMIN — SODIUM CHLORIDE SCH UNIT: 9 INJECTION, SOLUTION INTRAVENOUS at 21:40

## 2018-11-09 RX ADMIN — IPRATROPIUM BROMIDE AND ALBUTEROL SULFATE SCH UDVIAL: .5; 3 SOLUTION RESPIRATORY (INHALATION) at 11:31

## 2018-11-09 RX ADMIN — ATORVASTATIN CALCIUM SCH MG: 40 TABLET, FILM COATED ORAL at 20:27

## 2018-11-09 RX ADMIN — Medication SCH MG: at 09:55

## 2018-11-09 RX ADMIN — ATENOLOL SCH MG: 25 TABLET ORAL at 20:27

## 2018-11-09 RX ADMIN — PIPERACILLIN SODIUM AND TAZOBACTAM SODIUM SCH MLS/HR: .375; 3 INJECTION, POWDER, LYOPHILIZED, FOR SOLUTION INTRAVENOUS at 06:00

## 2018-11-09 RX ADMIN — POTASSIUM CHLORIDE PRN MEQ: 1500 TABLET, EXTENDED RELEASE ORAL at 15:25

## 2018-11-09 RX ADMIN — CLOPIDOGREL BISULFATE SCH MG: 75 TABLET, FILM COATED ORAL at 09:54

## 2018-11-09 RX ADMIN — IPRATROPIUM BROMIDE AND ALBUTEROL SULFATE SCH UDVIAL: .5; 3 SOLUTION RESPIRATORY (INHALATION) at 06:40

## 2018-11-09 RX ADMIN — POTASSIUM CHLORIDE PRN MEQ: 1500 TABLET, EXTENDED RELEASE ORAL at 17:02

## 2018-11-09 RX ADMIN — ATENOLOL SCH MG: 25 TABLET ORAL at 09:52

## 2018-11-09 RX ADMIN — SODIUM CHLORIDE SCH UNIT: 9 INJECTION, SOLUTION INTRAVENOUS at 16:59

## 2018-11-09 RX ADMIN — SODIUM CHLORIDE SCH UNIT: 9 INJECTION, SOLUTION INTRAVENOUS at 07:30

## 2018-11-09 RX ADMIN — IPRATROPIUM BROMIDE AND ALBUTEROL SULFATE SCH UDVIAL: .5; 3 SOLUTION RESPIRATORY (INHALATION) at 18:23

## 2018-11-09 RX ADMIN — PIPERACILLIN SODIUM AND TAZOBACTAM SODIUM SCH MLS/HR: .375; 3 INJECTION, POWDER, LYOPHILIZED, FOR SOLUTION INTRAVENOUS at 21:41

## 2018-11-09 RX ADMIN — LISINOPRIL SCH MG: 5 TABLET ORAL at 09:51

## 2018-11-09 RX ADMIN — LEVOFLOXACIN SCH MG: 500 TABLET, FILM COATED ORAL at 12:09

## 2018-11-09 RX ADMIN — PANTOPRAZOLE SODIUM SCH MG: 40 TABLET, DELAYED RELEASE ORAL at 09:55

## 2018-11-09 RX ADMIN — SODIUM CHLORIDE SCH UNIT: 9 INJECTION, SOLUTION INTRAVENOUS at 12:07

## 2018-11-09 RX ADMIN — VANCOMYCIN HYDROCHLORIDE SCH MLS/HR: 1 INJECTION, POWDER, LYOPHILIZED, FOR SOLUTION INTRAVENOUS at 05:00

## 2018-11-09 RX ADMIN — PIPERACILLIN SODIUM AND TAZOBACTAM SODIUM SCH MLS/HR: .375; 3 INJECTION, POWDER, LYOPHILIZED, FOR SOLUTION INTRAVENOUS at 15:22

## 2018-11-09 RX ADMIN — CHLORTHALIDONE SCH EACH: 50 TABLET ORAL at 09:00

## 2018-11-10 VITALS — SYSTOLIC BLOOD PRESSURE: 133 MMHG | DIASTOLIC BLOOD PRESSURE: 86 MMHG

## 2018-11-10 VITALS — SYSTOLIC BLOOD PRESSURE: 146 MMHG | DIASTOLIC BLOOD PRESSURE: 85 MMHG

## 2018-11-10 VITALS — DIASTOLIC BLOOD PRESSURE: 80 MMHG | SYSTOLIC BLOOD PRESSURE: 128 MMHG

## 2018-11-10 VITALS — SYSTOLIC BLOOD PRESSURE: 123 MMHG | DIASTOLIC BLOOD PRESSURE: 71 MMHG

## 2018-11-10 VITALS — DIASTOLIC BLOOD PRESSURE: 73 MMHG | SYSTOLIC BLOOD PRESSURE: 117 MMHG

## 2018-11-10 VITALS — DIASTOLIC BLOOD PRESSURE: 85 MMHG | SYSTOLIC BLOOD PRESSURE: 139 MMHG

## 2018-11-10 LAB
BASOPHILS NFR BLD AUTO: 0.2 % (ref 0–5)
BUN SERPL-MCNC: 16 MG/DL (ref 7–18)
CHLORIDE SERPL-SCNC: 106 MMOL/L (ref 101–111)
CO2 SERPL-SCNC: 28 MMOL/L (ref 21–32)
CREAT SERPL-MCNC: 0.9 MG/DL (ref 0.5–1.5)
EOSINOPHIL NFR BLD AUTO: 2.6 % (ref 0–8)
ERYTHROCYTE [DISTWIDTH] IN BLOOD BY AUTOMATED COUNT: 15.4 % (ref 11–15.5)
GFR SERPL CREATININE-BSD FRML MDRD: 92 ML/MIN (ref 60–?)
GLUCOSE SERPL-MCNC: 148 MG/DL (ref 70–105)
HCT VFR BLD AUTO: 28.3 % (ref 42–54)
LYMPHOCYTES NFR SPEC AUTO: 29.2 % (ref 21–51)
MCH RBC QN AUTO: 32.4 PG (ref 27–33)
MCHC RBC AUTO-ENTMCNC: 33.4 G/DL (ref 32–36)
MCV RBC AUTO: 97 FL (ref 79–99)
MONOCYTES NFR BLD AUTO: 13.8 % (ref 3–13)
NEUTROPHILS NFR BLD AUTO: 54.2 % (ref 40–77)
NRBC BLD MANUAL-RTO: 0.1 % (ref 0–0.19)
PLATELET # BLD AUTO: 92 K/UL (ref 130–400)
POTASSIUM SERPL-SCNC: 3.4 MMOL/L (ref 3.5–5.1)
RBC # BLD AUTO: 2.92 MIL/UL (ref 4.5–6.2)
SODIUM SERPL-SCNC: 141 MMOL/L (ref 136–145)
WBC # BLD AUTO: 4.4 K/UL (ref 4.8–10.8)

## 2018-11-10 RX ADMIN — IPRATROPIUM BROMIDE AND ALBUTEROL SULFATE SCH UDVIAL: .5; 3 SOLUTION RESPIRATORY (INHALATION) at 06:22

## 2018-11-10 RX ADMIN — POTASSIUM CHLORIDE PRN MEQ: 1500 TABLET, EXTENDED RELEASE ORAL at 06:01

## 2018-11-10 RX ADMIN — POTASSIUM CHLORIDE PRN MEQ: 1500 TABLET, EXTENDED RELEASE ORAL at 11:55

## 2018-11-10 RX ADMIN — SODIUM CHLORIDE SCH UNIT: 9 INJECTION, SOLUTION INTRAVENOUS at 21:06

## 2018-11-10 RX ADMIN — ASPIRIN SCH MG: 81 TABLET, CHEWABLE ORAL at 10:16

## 2018-11-10 RX ADMIN — LISINOPRIL SCH MG: 5 TABLET ORAL at 10:16

## 2018-11-10 RX ADMIN — SODIUM CHLORIDE SCH UNIT: 9 INJECTION, SOLUTION INTRAVENOUS at 12:18

## 2018-11-10 RX ADMIN — SODIUM CHLORIDE SCH UNIT: 9 INJECTION, SOLUTION INTRAVENOUS at 06:40

## 2018-11-10 RX ADMIN — CLOPIDOGREL BISULFATE SCH MG: 75 TABLET, FILM COATED ORAL at 10:16

## 2018-11-10 RX ADMIN — IPRATROPIUM BROMIDE AND ALBUTEROL SULFATE SCH UDVIAL: .5; 3 SOLUTION RESPIRATORY (INHALATION) at 23:53

## 2018-11-10 RX ADMIN — ATENOLOL SCH MG: 25 TABLET ORAL at 20:05

## 2018-11-10 RX ADMIN — SODIUM CHLORIDE SCH UNIT: 9 INJECTION, SOLUTION INTRAVENOUS at 18:20

## 2018-11-10 RX ADMIN — IPRATROPIUM BROMIDE AND ALBUTEROL SULFATE SCH UDVIAL: .5; 3 SOLUTION RESPIRATORY (INHALATION) at 00:25

## 2018-11-10 RX ADMIN — PIPERACILLIN SODIUM AND TAZOBACTAM SODIUM SCH MLS/HR: .375; 3 INJECTION, POWDER, LYOPHILIZED, FOR SOLUTION INTRAVENOUS at 14:36

## 2018-11-10 RX ADMIN — IPRATROPIUM BROMIDE AND ALBUTEROL SULFATE SCH UDVIAL: .5; 3 SOLUTION RESPIRATORY (INHALATION) at 17:43

## 2018-11-10 RX ADMIN — PIPERACILLIN SODIUM AND TAZOBACTAM SODIUM SCH MLS/HR: .375; 3 INJECTION, POWDER, LYOPHILIZED, FOR SOLUTION INTRAVENOUS at 05:27

## 2018-11-10 RX ADMIN — PIPERACILLIN SODIUM AND TAZOBACTAM SODIUM SCH MLS/HR: .375; 3 INJECTION, POWDER, LYOPHILIZED, FOR SOLUTION INTRAVENOUS at 21:03

## 2018-11-10 RX ADMIN — LEVOFLOXACIN SCH MG: 500 TABLET, FILM COATED ORAL at 11:54

## 2018-11-10 RX ADMIN — ATENOLOL SCH MG: 25 TABLET ORAL at 10:16

## 2018-11-10 RX ADMIN — Medication SCH MG: at 10:17

## 2018-11-10 RX ADMIN — CHLORTHALIDONE SCH EACH: 50 TABLET ORAL at 09:00

## 2018-11-10 RX ADMIN — PANTOPRAZOLE SODIUM SCH MG: 40 TABLET, DELAYED RELEASE ORAL at 10:16

## 2018-11-10 RX ADMIN — IPRATROPIUM BROMIDE AND ALBUTEROL SULFATE SCH UDVIAL: .5; 3 SOLUTION RESPIRATORY (INHALATION) at 11:35

## 2018-11-10 RX ADMIN — ATORVASTATIN CALCIUM SCH MG: 40 TABLET, FILM COATED ORAL at 20:04

## 2018-11-11 VITALS — DIASTOLIC BLOOD PRESSURE: 80 MMHG | SYSTOLIC BLOOD PRESSURE: 135 MMHG

## 2018-11-11 VITALS — SYSTOLIC BLOOD PRESSURE: 126 MMHG | DIASTOLIC BLOOD PRESSURE: 76 MMHG

## 2018-11-11 VITALS — SYSTOLIC BLOOD PRESSURE: 115 MMHG | DIASTOLIC BLOOD PRESSURE: 72 MMHG

## 2018-11-11 VITALS — SYSTOLIC BLOOD PRESSURE: 132 MMHG | DIASTOLIC BLOOD PRESSURE: 86 MMHG

## 2018-11-11 VITALS — DIASTOLIC BLOOD PRESSURE: 92 MMHG | SYSTOLIC BLOOD PRESSURE: 157 MMHG

## 2018-11-11 VITALS — DIASTOLIC BLOOD PRESSURE: 74 MMHG | SYSTOLIC BLOOD PRESSURE: 117 MMHG

## 2018-11-11 LAB
BUN SERPL-MCNC: 13 MG/DL (ref 7–18)
CHLORIDE SERPL-SCNC: 104 MMOL/L (ref 101–111)
CO2 SERPL-SCNC: 28 MMOL/L (ref 21–32)
CREAT SERPL-MCNC: 0.9 MG/DL (ref 0.5–1.5)
EOSINOPHIL NFR BLD MANUAL: 5 % (ref 1–6)
ERYTHROCYTE [DISTWIDTH] IN BLOOD BY AUTOMATED COUNT: 15.4 % (ref 11–15.5)
GFR SERPL CREATININE-BSD FRML MDRD: 92 ML/MIN (ref 60–?)
GLUCOSE SERPL-MCNC: 156 MG/DL (ref 70–105)
HCT VFR BLD AUTO: 28.7 % (ref 42–54)
LYMPHOCYTES NFR BLD MANUAL: 25 % (ref 22–44)
MANUAL DIF COMMENT BLD-IMP: (no result)
MCH RBC QN AUTO: 33.4 PG (ref 27–33)
MCHC RBC AUTO-ENTMCNC: 34.9 G/DL (ref 32–36)
MCV RBC AUTO: 95.8 FL (ref 79–99)
MONOCYTES NFR BLD MANUAL: 7 % (ref 2–9)
NEUTS SEG NFR BLD MANUAL: 63 % (ref 40–70)
NRBC BLD MANUAL-RTO: 0 % (ref 0–0.19)
PLAT MORPH BLD: (no result)
PLATELET # BLD AUTO: 107 K/UL (ref 130–400)
POTASSIUM SERPL-SCNC: 3.4 MMOL/L (ref 3.5–5.1)
RBC # BLD AUTO: 2.99 MIL/UL (ref 4.5–6.2)
RBC MORPH BLD: NORMAL
SODIUM SERPL-SCNC: 140 MMOL/L (ref 136–145)
WBC # BLD AUTO: 4.7 K/UL (ref 4.8–10.8)

## 2018-11-11 RX ADMIN — POTASSIUM CHLORIDE PRN MEQ: 1500 TABLET, EXTENDED RELEASE ORAL at 11:51

## 2018-11-11 RX ADMIN — CLOPIDOGREL BISULFATE SCH MG: 75 TABLET, FILM COATED ORAL at 10:16

## 2018-11-11 RX ADMIN — PIPERACILLIN SODIUM AND TAZOBACTAM SODIUM SCH MLS/HR: .375; 3 INJECTION, POWDER, LYOPHILIZED, FOR SOLUTION INTRAVENOUS at 04:57

## 2018-11-11 RX ADMIN — SODIUM CHLORIDE SCH UNIT: 9 INJECTION, SOLUTION INTRAVENOUS at 11:53

## 2018-11-11 RX ADMIN — IPRATROPIUM BROMIDE AND ALBUTEROL SULFATE SCH UDVIAL: .5; 3 SOLUTION RESPIRATORY (INHALATION) at 18:14

## 2018-11-11 RX ADMIN — ATORVASTATIN CALCIUM SCH MG: 40 TABLET, FILM COATED ORAL at 19:52

## 2018-11-11 RX ADMIN — ASPIRIN SCH MG: 81 TABLET, CHEWABLE ORAL at 10:16

## 2018-11-11 RX ADMIN — IPRATROPIUM BROMIDE AND ALBUTEROL SULFATE SCH UDVIAL: .5; 3 SOLUTION RESPIRATORY (INHALATION) at 11:21

## 2018-11-11 RX ADMIN — LEVOFLOXACIN SCH MG: 500 TABLET, FILM COATED ORAL at 11:50

## 2018-11-11 RX ADMIN — Medication SCH MG: at 10:17

## 2018-11-11 RX ADMIN — POTASSIUM CHLORIDE PRN MEQ: 1500 TABLET, EXTENDED RELEASE ORAL at 06:37

## 2018-11-11 RX ADMIN — CHLORTHALIDONE SCH EACH: 50 TABLET ORAL at 09:00

## 2018-11-11 RX ADMIN — SODIUM CHLORIDE SCH UNIT: 9 INJECTION, SOLUTION INTRAVENOUS at 06:18

## 2018-11-11 RX ADMIN — ATENOLOL SCH MG: 25 TABLET ORAL at 19:52

## 2018-11-11 RX ADMIN — PIPERACILLIN SODIUM AND TAZOBACTAM SODIUM SCH MLS/HR: .375; 3 INJECTION, POWDER, LYOPHILIZED, FOR SOLUTION INTRAVENOUS at 22:39

## 2018-11-11 RX ADMIN — SODIUM CHLORIDE SCH UNIT: 9 INJECTION, SOLUTION INTRAVENOUS at 20:53

## 2018-11-11 RX ADMIN — IPRATROPIUM BROMIDE AND ALBUTEROL SULFATE SCH UDVIAL: .5; 3 SOLUTION RESPIRATORY (INHALATION) at 06:30

## 2018-11-11 RX ADMIN — IPRATROPIUM BROMIDE AND ALBUTEROL SULFATE SCH UDVIAL: .5; 3 SOLUTION RESPIRATORY (INHALATION) at 23:34

## 2018-11-11 RX ADMIN — LISINOPRIL SCH MG: 5 TABLET ORAL at 10:16

## 2018-11-11 RX ADMIN — PANTOPRAZOLE SODIUM SCH MG: 40 TABLET, DELAYED RELEASE ORAL at 10:16

## 2018-11-11 RX ADMIN — SODIUM CHLORIDE SCH UNIT: 9 INJECTION, SOLUTION INTRAVENOUS at 17:17

## 2018-11-11 RX ADMIN — FUROSEMIDE SCH MG: 20 TABLET ORAL at 17:15

## 2018-11-11 RX ADMIN — ATENOLOL SCH MG: 25 TABLET ORAL at 10:17

## 2018-11-11 RX ADMIN — PIPERACILLIN SODIUM AND TAZOBACTAM SODIUM SCH MLS/HR: .375; 3 INJECTION, POWDER, LYOPHILIZED, FOR SOLUTION INTRAVENOUS at 17:22

## 2018-11-12 VITALS — SYSTOLIC BLOOD PRESSURE: 134 MMHG | DIASTOLIC BLOOD PRESSURE: 80 MMHG

## 2018-11-12 VITALS — SYSTOLIC BLOOD PRESSURE: 131 MMHG | DIASTOLIC BLOOD PRESSURE: 74 MMHG

## 2018-11-12 VITALS — DIASTOLIC BLOOD PRESSURE: 78 MMHG | SYSTOLIC BLOOD PRESSURE: 130 MMHG

## 2018-11-12 VITALS — SYSTOLIC BLOOD PRESSURE: 122 MMHG | DIASTOLIC BLOOD PRESSURE: 76 MMHG

## 2018-11-12 VITALS — DIASTOLIC BLOOD PRESSURE: 70 MMHG | SYSTOLIC BLOOD PRESSURE: 133 MMHG

## 2018-11-12 VITALS — DIASTOLIC BLOOD PRESSURE: 86 MMHG | SYSTOLIC BLOOD PRESSURE: 158 MMHG

## 2018-11-12 VITALS — DIASTOLIC BLOOD PRESSURE: 82 MMHG | SYSTOLIC BLOOD PRESSURE: 135 MMHG

## 2018-11-12 LAB
BASOPHILS NFR BLD MANUAL: 1 % (ref 0–2)
BUN SERPL-MCNC: 16 MG/DL (ref 7–18)
CHLORIDE SERPL-SCNC: 104 MMOL/L (ref 101–111)
CO2 SERPL-SCNC: 30 MMOL/L (ref 21–32)
CREAT SERPL-MCNC: 0.9 MG/DL (ref 0.5–1.5)
EOSINOPHIL NFR BLD MANUAL: 5 % (ref 1–6)
ERYTHROCYTE [DISTWIDTH] IN BLOOD BY AUTOMATED COUNT: 15.5 % (ref 11–15.5)
GFR SERPL CREATININE-BSD FRML MDRD: 92 ML/MIN (ref 60–?)
GLUCOSE SERPL-MCNC: 131 MG/DL (ref 70–105)
HCT VFR BLD AUTO: 28.7 % (ref 42–54)
LYMPHOCYTES NFR BLD MANUAL: 37 % (ref 22–44)
MAGNESIUM SERPL-MCNC: 1.8 MG/DL (ref 1.8–2.4)
MANUAL DIF COMMENT BLD-IMP: (no result)
MCH RBC QN AUTO: 33.2 PG (ref 27–33)
MCHC RBC AUTO-ENTMCNC: 34.6 G/DL (ref 32–36)
MCV RBC AUTO: 95.9 FL (ref 79–99)
MONOCYTES NFR BLD MANUAL: 9 % (ref 2–9)
NEUTS SEG NFR BLD MANUAL: 48 % (ref 40–70)
NRBC BLD MANUAL-RTO: 0.1 % (ref 0–0.19)
PLAT MORPH BLD: (no result)
PLATELET # BLD AUTO: 108 K/UL (ref 130–400)
POTASSIUM SERPL-SCNC: 3.6 MMOL/L (ref 3.5–5.1)
RBC # BLD AUTO: 3 MIL/UL (ref 4.5–6.2)
RBC MORPH BLD: (no result)
SODIUM SERPL-SCNC: 139 MMOL/L (ref 136–145)
WBC # BLD AUTO: 4.3 K/UL (ref 4.8–10.8)

## 2018-11-12 RX ADMIN — ATENOLOL SCH MG: 25 TABLET ORAL at 10:01

## 2018-11-12 RX ADMIN — SODIUM CHLORIDE SCH UNIT: 9 INJECTION, SOLUTION INTRAVENOUS at 20:42

## 2018-11-12 RX ADMIN — FUROSEMIDE SCH MG: 20 TABLET ORAL at 10:02

## 2018-11-12 RX ADMIN — IPRATROPIUM BROMIDE AND ALBUTEROL SULFATE SCH UDVIAL: .5; 3 SOLUTION RESPIRATORY (INHALATION) at 11:35

## 2018-11-12 RX ADMIN — IPRATROPIUM BROMIDE AND ALBUTEROL SULFATE SCH UDVIAL: .5; 3 SOLUTION RESPIRATORY (INHALATION) at 06:34

## 2018-11-12 RX ADMIN — PIPERACILLIN SODIUM AND TAZOBACTAM SODIUM SCH MLS/HR: .375; 3 INJECTION, POWDER, LYOPHILIZED, FOR SOLUTION INTRAVENOUS at 16:18

## 2018-11-12 RX ADMIN — POTASSIUM CHLORIDE PRN MEQ: 1500 TABLET, EXTENDED RELEASE ORAL at 20:37

## 2018-11-12 RX ADMIN — LISINOPRIL SCH MG: 5 TABLET ORAL at 09:59

## 2018-11-12 RX ADMIN — POTASSIUM CHLORIDE PRN MEQ: 1500 TABLET, EXTENDED RELEASE ORAL at 10:03

## 2018-11-12 RX ADMIN — SODIUM CHLORIDE SCH UNIT: 9 INJECTION, SOLUTION INTRAVENOUS at 06:34

## 2018-11-12 RX ADMIN — CHLORTHALIDONE SCH EACH: 50 TABLET ORAL at 09:00

## 2018-11-12 RX ADMIN — PANTOPRAZOLE SODIUM SCH MG: 40 TABLET, DELAYED RELEASE ORAL at 10:00

## 2018-11-12 RX ADMIN — PIPERACILLIN SODIUM AND TAZOBACTAM SODIUM SCH MLS/HR: .375; 3 INJECTION, POWDER, LYOPHILIZED, FOR SOLUTION INTRAVENOUS at 05:16

## 2018-11-12 RX ADMIN — PIPERACILLIN SODIUM AND TAZOBACTAM SODIUM SCH MLS/HR: .375; 3 INJECTION, POWDER, LYOPHILIZED, FOR SOLUTION INTRAVENOUS at 21:46

## 2018-11-12 RX ADMIN — FUROSEMIDE SCH MG: 20 TABLET ORAL at 17:22

## 2018-11-12 RX ADMIN — LEVOFLOXACIN SCH MG: 500 TABLET, FILM COATED ORAL at 12:18

## 2018-11-12 RX ADMIN — SODIUM CHLORIDE SCH UNIT: 9 INJECTION, SOLUTION INTRAVENOUS at 12:22

## 2018-11-12 RX ADMIN — ASPIRIN SCH MG: 81 TABLET, CHEWABLE ORAL at 09:58

## 2018-11-12 RX ADMIN — SODIUM CHLORIDE SCH UNIT: 9 INJECTION, SOLUTION INTRAVENOUS at 17:22

## 2018-11-12 RX ADMIN — ATORVASTATIN CALCIUM SCH MG: 40 TABLET, FILM COATED ORAL at 20:36

## 2018-11-12 RX ADMIN — Medication SCH MG: at 10:01

## 2018-11-12 RX ADMIN — ATENOLOL SCH MG: 25 TABLET ORAL at 20:37

## 2018-11-12 RX ADMIN — IPRATROPIUM BROMIDE AND ALBUTEROL SULFATE SCH UDVIAL: .5; 3 SOLUTION RESPIRATORY (INHALATION) at 23:50

## 2018-11-12 RX ADMIN — CLOPIDOGREL BISULFATE SCH MG: 75 TABLET, FILM COATED ORAL at 09:59

## 2018-11-12 RX ADMIN — IPRATROPIUM BROMIDE AND ALBUTEROL SULFATE SCH UDVIAL: .5; 3 SOLUTION RESPIRATORY (INHALATION) at 18:48

## 2018-11-13 VITALS — SYSTOLIC BLOOD PRESSURE: 135 MMHG | DIASTOLIC BLOOD PRESSURE: 82 MMHG

## 2018-11-13 VITALS — SYSTOLIC BLOOD PRESSURE: 131 MMHG | DIASTOLIC BLOOD PRESSURE: 74 MMHG

## 2018-11-13 VITALS — DIASTOLIC BLOOD PRESSURE: 71 MMHG | SYSTOLIC BLOOD PRESSURE: 148 MMHG

## 2018-11-13 VITALS — SYSTOLIC BLOOD PRESSURE: 134 MMHG | DIASTOLIC BLOOD PRESSURE: 81 MMHG

## 2018-11-13 VITALS — SYSTOLIC BLOOD PRESSURE: 139 MMHG | DIASTOLIC BLOOD PRESSURE: 78 MMHG

## 2018-11-13 VITALS — SYSTOLIC BLOOD PRESSURE: 129 MMHG | DIASTOLIC BLOOD PRESSURE: 75 MMHG

## 2018-11-13 RX ADMIN — CLOPIDOGREL BISULFATE SCH MG: 75 TABLET, FILM COATED ORAL at 09:05

## 2018-11-13 RX ADMIN — SODIUM CHLORIDE SCH UNIT: 9 INJECTION, SOLUTION INTRAVENOUS at 20:24

## 2018-11-13 RX ADMIN — Medication SCH MG: at 09:06

## 2018-11-13 RX ADMIN — FUROSEMIDE SCH MG: 20 TABLET ORAL at 18:10

## 2018-11-13 RX ADMIN — ATORVASTATIN CALCIUM SCH MG: 40 TABLET, FILM COATED ORAL at 20:17

## 2018-11-13 RX ADMIN — LEVOFLOXACIN SCH MG: 500 TABLET, FILM COATED ORAL at 09:12

## 2018-11-13 RX ADMIN — FUROSEMIDE SCH MG: 20 TABLET ORAL at 09:05

## 2018-11-13 RX ADMIN — IPRATROPIUM BROMIDE AND ALBUTEROL SULFATE SCH UDVIAL: .5; 3 SOLUTION RESPIRATORY (INHALATION) at 11:08

## 2018-11-13 RX ADMIN — ASPIRIN SCH MG: 81 TABLET, CHEWABLE ORAL at 09:06

## 2018-11-13 RX ADMIN — PIPERACILLIN SODIUM AND TAZOBACTAM SODIUM SCH MLS/HR: .375; 3 INJECTION, POWDER, LYOPHILIZED, FOR SOLUTION INTRAVENOUS at 04:46

## 2018-11-13 RX ADMIN — POTASSIUM CHLORIDE PRN MEQ: 1500 TABLET, EXTENDED RELEASE ORAL at 06:02

## 2018-11-13 RX ADMIN — ATENOLOL SCH MG: 25 TABLET ORAL at 20:17

## 2018-11-13 RX ADMIN — PANTOPRAZOLE SODIUM SCH MG: 40 TABLET, DELAYED RELEASE ORAL at 09:05

## 2018-11-13 RX ADMIN — SODIUM CHLORIDE SCH UNIT: 9 INJECTION, SOLUTION INTRAVENOUS at 18:11

## 2018-11-13 RX ADMIN — PIPERACILLIN SODIUM AND TAZOBACTAM SODIUM SCH MLS/HR: .375; 3 INJECTION, POWDER, LYOPHILIZED, FOR SOLUTION INTRAVENOUS at 13:40

## 2018-11-13 RX ADMIN — CHLORTHALIDONE SCH EACH: 50 TABLET ORAL at 09:00

## 2018-11-13 RX ADMIN — IPRATROPIUM BROMIDE AND ALBUTEROL SULFATE SCH UDVIAL: .5; 3 SOLUTION RESPIRATORY (INHALATION) at 23:08

## 2018-11-13 RX ADMIN — IPRATROPIUM BROMIDE AND ALBUTEROL SULFATE SCH UDVIAL: .5; 3 SOLUTION RESPIRATORY (INHALATION) at 19:01

## 2018-11-13 RX ADMIN — POTASSIUM CHLORIDE PRN MEQ: 1500 TABLET, EXTENDED RELEASE ORAL at 09:12

## 2018-11-13 RX ADMIN — LISINOPRIL SCH MG: 5 TABLET ORAL at 09:06

## 2018-11-13 RX ADMIN — SODIUM CHLORIDE SCH UNIT: 9 INJECTION, SOLUTION INTRAVENOUS at 18:18

## 2018-11-13 RX ADMIN — SODIUM CHLORIDE SCH UNIT: 9 INJECTION, SOLUTION INTRAVENOUS at 05:55

## 2018-11-13 RX ADMIN — IPRATROPIUM BROMIDE AND ALBUTEROL SULFATE SCH UDVIAL: .5; 3 SOLUTION RESPIRATORY (INHALATION) at 06:28

## 2018-11-13 RX ADMIN — ATENOLOL SCH MG: 25 TABLET ORAL at 09:00

## 2018-11-14 VITALS — DIASTOLIC BLOOD PRESSURE: 73 MMHG | SYSTOLIC BLOOD PRESSURE: 130 MMHG

## 2018-11-14 VITALS — DIASTOLIC BLOOD PRESSURE: 66 MMHG | SYSTOLIC BLOOD PRESSURE: 121 MMHG

## 2018-11-14 VITALS — DIASTOLIC BLOOD PRESSURE: 88 MMHG | SYSTOLIC BLOOD PRESSURE: 154 MMHG

## 2018-11-14 VITALS — DIASTOLIC BLOOD PRESSURE: 72 MMHG | SYSTOLIC BLOOD PRESSURE: 110 MMHG

## 2018-11-14 VITALS — DIASTOLIC BLOOD PRESSURE: 78 MMHG | SYSTOLIC BLOOD PRESSURE: 118 MMHG

## 2018-11-14 RX ADMIN — LEVOFLOXACIN SCH MG: 500 TABLET, FILM COATED ORAL at 11:38

## 2018-11-14 RX ADMIN — CHLORTHALIDONE SCH EACH: 50 TABLET ORAL at 09:00

## 2018-11-14 RX ADMIN — Medication SCH MG: at 09:26

## 2018-11-14 RX ADMIN — IPRATROPIUM BROMIDE AND ALBUTEROL SULFATE SCH UDVIAL: .5; 3 SOLUTION RESPIRATORY (INHALATION) at 23:14

## 2018-11-14 RX ADMIN — ASPIRIN SCH MG: 81 TABLET, CHEWABLE ORAL at 09:25

## 2018-11-14 RX ADMIN — ATENOLOL SCH MG: 25 TABLET ORAL at 09:26

## 2018-11-14 RX ADMIN — CLOPIDOGREL BISULFATE SCH MG: 75 TABLET, FILM COATED ORAL at 09:26

## 2018-11-14 RX ADMIN — FUROSEMIDE SCH MG: 20 TABLET ORAL at 09:25

## 2018-11-14 RX ADMIN — IPRATROPIUM BROMIDE AND ALBUTEROL SULFATE SCH UDVIAL: .5; 3 SOLUTION RESPIRATORY (INHALATION) at 18:29

## 2018-11-14 RX ADMIN — PANTOPRAZOLE SODIUM SCH MG: 40 TABLET, DELAYED RELEASE ORAL at 09:25

## 2018-11-14 RX ADMIN — PIPERACILLIN SODIUM AND TAZOBACTAM SODIUM SCH MLS/HR: .375; 3 INJECTION, POWDER, LYOPHILIZED, FOR SOLUTION INTRAVENOUS at 06:44

## 2018-11-14 RX ADMIN — PIPERACILLIN SODIUM AND TAZOBACTAM SODIUM SCH MLS/HR: .375; 3 INJECTION, POWDER, LYOPHILIZED, FOR SOLUTION INTRAVENOUS at 21:32

## 2018-11-14 RX ADMIN — ATENOLOL SCH MG: 25 TABLET ORAL at 21:30

## 2018-11-14 RX ADMIN — PIPERACILLIN SODIUM AND TAZOBACTAM SODIUM SCH MLS/HR: .375; 3 INJECTION, POWDER, LYOPHILIZED, FOR SOLUTION INTRAVENOUS at 14:00

## 2018-11-14 RX ADMIN — LISINOPRIL SCH MG: 5 TABLET ORAL at 09:25

## 2018-11-14 RX ADMIN — SODIUM CHLORIDE SCH UNIT: 9 INJECTION, SOLUTION INTRAVENOUS at 21:49

## 2018-11-14 RX ADMIN — SODIUM CHLORIDE SCH UNIT: 9 INJECTION, SOLUTION INTRAVENOUS at 06:09

## 2018-11-14 RX ADMIN — IPRATROPIUM BROMIDE AND ALBUTEROL SULFATE SCH UDVIAL: .5; 3 SOLUTION RESPIRATORY (INHALATION) at 06:40

## 2018-11-14 RX ADMIN — IPRATROPIUM BROMIDE AND ALBUTEROL SULFATE SCH UDVIAL: .5; 3 SOLUTION RESPIRATORY (INHALATION) at 11:32

## 2018-11-14 RX ADMIN — ATORVASTATIN CALCIUM SCH MG: 40 TABLET, FILM COATED ORAL at 21:30

## 2018-11-14 RX ADMIN — FUROSEMIDE SCH MG: 20 TABLET ORAL at 17:07

## 2018-11-14 RX ADMIN — SODIUM CHLORIDE SCH UNIT: 9 INJECTION, SOLUTION INTRAVENOUS at 17:13

## 2018-11-14 RX ADMIN — SODIUM CHLORIDE SCH UNIT: 9 INJECTION, SOLUTION INTRAVENOUS at 11:43

## 2018-11-15 VITALS — DIASTOLIC BLOOD PRESSURE: 68 MMHG | SYSTOLIC BLOOD PRESSURE: 112 MMHG

## 2018-11-15 VITALS — SYSTOLIC BLOOD PRESSURE: 124 MMHG | DIASTOLIC BLOOD PRESSURE: 72 MMHG

## 2018-11-15 VITALS — DIASTOLIC BLOOD PRESSURE: 72 MMHG | SYSTOLIC BLOOD PRESSURE: 111 MMHG

## 2018-11-15 VITALS — SYSTOLIC BLOOD PRESSURE: 109 MMHG | DIASTOLIC BLOOD PRESSURE: 66 MMHG

## 2018-11-15 VITALS — DIASTOLIC BLOOD PRESSURE: 75 MMHG | SYSTOLIC BLOOD PRESSURE: 132 MMHG

## 2018-11-15 VITALS — DIASTOLIC BLOOD PRESSURE: 73 MMHG | SYSTOLIC BLOOD PRESSURE: 122 MMHG

## 2018-11-15 RX ADMIN — CHLORTHALIDONE SCH EACH: 50 TABLET ORAL at 09:00

## 2018-11-15 RX ADMIN — SODIUM CHLORIDE SCH UNIT: 9 INJECTION, SOLUTION INTRAVENOUS at 21:00

## 2018-11-15 RX ADMIN — IPRATROPIUM BROMIDE AND ALBUTEROL SULFATE SCH UDVIAL: .5; 3 SOLUTION RESPIRATORY (INHALATION) at 23:25

## 2018-11-15 RX ADMIN — ASPIRIN SCH MG: 81 TABLET, CHEWABLE ORAL at 09:51

## 2018-11-15 RX ADMIN — LEVOFLOXACIN SCH MG: 500 TABLET, FILM COATED ORAL at 12:09

## 2018-11-15 RX ADMIN — LISINOPRIL SCH MG: 5 TABLET ORAL at 09:51

## 2018-11-15 RX ADMIN — PIPERACILLIN SODIUM AND TAZOBACTAM SODIUM SCH MLS/HR: .375; 3 INJECTION, POWDER, LYOPHILIZED, FOR SOLUTION INTRAVENOUS at 05:16

## 2018-11-15 RX ADMIN — PIPERACILLIN SODIUM AND TAZOBACTAM SODIUM SCH MLS/HR: .375; 3 INJECTION, POWDER, LYOPHILIZED, FOR SOLUTION INTRAVENOUS at 22:14

## 2018-11-15 RX ADMIN — SODIUM CHLORIDE SCH UNIT: 9 INJECTION, SOLUTION INTRAVENOUS at 06:36

## 2018-11-15 RX ADMIN — SODIUM CHLORIDE SCH UNIT: 9 INJECTION, SOLUTION INTRAVENOUS at 12:20

## 2018-11-15 RX ADMIN — IPRATROPIUM BROMIDE AND ALBUTEROL SULFATE SCH UDVIAL: .5; 3 SOLUTION RESPIRATORY (INHALATION) at 06:55

## 2018-11-15 RX ADMIN — ATENOLOL SCH MG: 25 TABLET ORAL at 09:51

## 2018-11-15 RX ADMIN — METFORMIN HYDROCHLORIDE SCH MG: 500 TABLET, EXTENDED RELEASE ORAL at 21:00

## 2018-11-15 RX ADMIN — PIPERACILLIN SODIUM AND TAZOBACTAM SODIUM SCH MLS/HR: .375; 3 INJECTION, POWDER, LYOPHILIZED, FOR SOLUTION INTRAVENOUS at 15:07

## 2018-11-15 RX ADMIN — PANTOPRAZOLE SODIUM SCH MG: 40 TABLET, DELAYED RELEASE ORAL at 09:51

## 2018-11-15 RX ADMIN — CHLORTHALIDONE SCH EACH: 50 TABLET ORAL at 21:00

## 2018-11-15 RX ADMIN — ATENOLOL SCH MG: 25 TABLET ORAL at 22:13

## 2018-11-15 RX ADMIN — ATORVASTATIN CALCIUM SCH MG: 40 TABLET, FILM COATED ORAL at 22:12

## 2018-11-15 RX ADMIN — CLOPIDOGREL BISULFATE SCH MG: 75 TABLET, FILM COATED ORAL at 09:49

## 2018-11-15 RX ADMIN — BUPIVACAINE HYDROCHLORIDE SCH MG: 2.5 INJECTION, SOLUTION EPIDURAL; INFILTRATION; INTRACAUDAL; PERINEURAL at 14:15

## 2018-11-15 RX ADMIN — IPRATROPIUM BROMIDE AND ALBUTEROL SULFATE SCH UDVIAL: .5; 3 SOLUTION RESPIRATORY (INHALATION) at 11:23

## 2018-11-15 RX ADMIN — Medication SCH MG: at 09:49

## 2018-11-15 RX ADMIN — FUROSEMIDE SCH MG: 20 TABLET ORAL at 09:52

## 2018-11-15 RX ADMIN — IPRATROPIUM BROMIDE AND ALBUTEROL SULFATE SCH UDVIAL: .5; 3 SOLUTION RESPIRATORY (INHALATION) at 18:25

## 2018-11-15 RX ADMIN — SODIUM CHLORIDE SCH UNIT: 9 INJECTION, SOLUTION INTRAVENOUS at 17:28

## 2018-11-15 RX ADMIN — FUROSEMIDE SCH MG: 20 TABLET ORAL at 18:09

## 2018-11-16 VITALS — SYSTOLIC BLOOD PRESSURE: 98 MMHG | DIASTOLIC BLOOD PRESSURE: 64 MMHG

## 2018-11-16 VITALS — DIASTOLIC BLOOD PRESSURE: 66 MMHG | SYSTOLIC BLOOD PRESSURE: 107 MMHG

## 2018-11-16 VITALS — DIASTOLIC BLOOD PRESSURE: 73 MMHG | SYSTOLIC BLOOD PRESSURE: 124 MMHG

## 2018-11-16 VITALS — SYSTOLIC BLOOD PRESSURE: 119 MMHG | DIASTOLIC BLOOD PRESSURE: 78 MMHG

## 2018-11-16 VITALS — DIASTOLIC BLOOD PRESSURE: 65 MMHG | SYSTOLIC BLOOD PRESSURE: 107 MMHG

## 2018-11-16 VITALS — DIASTOLIC BLOOD PRESSURE: 62 MMHG | SYSTOLIC BLOOD PRESSURE: 105 MMHG

## 2018-11-16 PROCEDURE — 0J9R0ZZ DRAINAGE OF LEFT FOOT SUBCUTANEOUS TISSUE AND FASCIA, OPEN APPROACH: ICD-10-PCS | Performed by: INTERNAL MEDICINE

## 2018-11-16 PROCEDURE — 0JBR0ZZ EXCISION OF LEFT FOOT SUBCUTANEOUS TISSUE AND FASCIA, OPEN APPROACH: ICD-10-PCS | Performed by: PODIATRIST

## 2018-11-16 RX ADMIN — BUPIVACAINE HYDROCHLORIDE SCH MG: 2.5 INJECTION, SOLUTION EPIDURAL; INFILTRATION; INTRACAUDAL; PERINEURAL at 14:15

## 2018-11-16 RX ADMIN — IPRATROPIUM BROMIDE AND ALBUTEROL SULFATE SCH UDVIAL: .5; 3 SOLUTION RESPIRATORY (INHALATION) at 11:20

## 2018-11-16 RX ADMIN — PANTOPRAZOLE SODIUM SCH MG: 40 TABLET, DELAYED RELEASE ORAL at 11:45

## 2018-11-16 RX ADMIN — IPRATROPIUM BROMIDE AND ALBUTEROL SULFATE SCH UDVIAL: .5; 3 SOLUTION RESPIRATORY (INHALATION) at 23:41

## 2018-11-16 RX ADMIN — METFORMIN HYDROCHLORIDE SCH MG: 500 TABLET, EXTENDED RELEASE ORAL at 20:57

## 2018-11-16 RX ADMIN — IPRATROPIUM BROMIDE AND ALBUTEROL SULFATE SCH UDVIAL: .5; 3 SOLUTION RESPIRATORY (INHALATION) at 18:30

## 2018-11-16 RX ADMIN — PIPERACILLIN SODIUM AND TAZOBACTAM SODIUM SCH MLS/HR: .375; 3 INJECTION, POWDER, LYOPHILIZED, FOR SOLUTION INTRAVENOUS at 05:36

## 2018-11-16 RX ADMIN — SODIUM CHLORIDE SCH UNIT: 9 INJECTION, SOLUTION INTRAVENOUS at 16:58

## 2018-11-16 RX ADMIN — FUROSEMIDE SCH MG: 20 TABLET ORAL at 16:59

## 2018-11-16 RX ADMIN — BUPIVACAINE HYDROCHLORIDE SCH MG: 2.5 INJECTION, SOLUTION EPIDURAL; INFILTRATION; INTRACAUDAL; PERINEURAL at 12:10

## 2018-11-16 RX ADMIN — METFORMIN HYDROCHLORIDE SCH MG: 500 TABLET, EXTENDED RELEASE ORAL at 09:00

## 2018-11-16 RX ADMIN — CHLORTHALIDONE SCH EACH: 50 TABLET ORAL at 21:00

## 2018-11-16 RX ADMIN — CLOPIDOGREL BISULFATE SCH MG: 75 TABLET, FILM COATED ORAL at 11:46

## 2018-11-16 RX ADMIN — PIPERACILLIN SODIUM AND TAZOBACTAM SODIUM SCH MLS/HR: .375; 3 INJECTION, POWDER, LYOPHILIZED, FOR SOLUTION INTRAVENOUS at 15:21

## 2018-11-16 RX ADMIN — ATENOLOL SCH MG: 25 TABLET ORAL at 20:57

## 2018-11-16 RX ADMIN — CHLORTHALIDONE SCH EACH: 50 TABLET ORAL at 08:55

## 2018-11-16 RX ADMIN — LISINOPRIL SCH MG: 5 TABLET ORAL at 11:46

## 2018-11-16 RX ADMIN — Medication SCH MG: at 11:49

## 2018-11-16 RX ADMIN — IPRATROPIUM BROMIDE AND ALBUTEROL SULFATE SCH UDVIAL: .5; 3 SOLUTION RESPIRATORY (INHALATION) at 06:27

## 2018-11-16 RX ADMIN — POTASSIUM CHLORIDE PRN MEQ: 1500 TABLET, EXTENDED RELEASE ORAL at 11:48

## 2018-11-16 RX ADMIN — PIPERACILLIN SODIUM AND TAZOBACTAM SODIUM SCH MLS/HR: .375; 3 INJECTION, POWDER, LYOPHILIZED, FOR SOLUTION INTRAVENOUS at 23:18

## 2018-11-16 RX ADMIN — SODIUM CHLORIDE SCH UNIT: 9 INJECTION, SOLUTION INTRAVENOUS at 21:01

## 2018-11-16 RX ADMIN — SODIUM CHLORIDE SCH UNIT: 9 INJECTION, SOLUTION INTRAVENOUS at 12:02

## 2018-11-16 RX ADMIN — METFORMIN HYDROCHLORIDE SCH MG: 500 TABLET, EXTENDED RELEASE ORAL at 11:48

## 2018-11-16 RX ADMIN — ATORVASTATIN CALCIUM SCH MG: 40 TABLET, FILM COATED ORAL at 20:56

## 2018-11-16 RX ADMIN — FUROSEMIDE SCH MG: 20 TABLET ORAL at 11:45

## 2018-11-16 RX ADMIN — LEVOFLOXACIN SCH MG: 500 TABLET, FILM COATED ORAL at 11:48

## 2018-11-16 RX ADMIN — ASPIRIN SCH MG: 81 TABLET, CHEWABLE ORAL at 11:44

## 2018-11-16 RX ADMIN — ATENOLOL SCH MG: 25 TABLET ORAL at 11:47

## 2018-11-16 RX ADMIN — SODIUM CHLORIDE SCH UNIT: 9 INJECTION, SOLUTION INTRAVENOUS at 06:56

## 2018-11-17 VITALS — SYSTOLIC BLOOD PRESSURE: 118 MMHG | DIASTOLIC BLOOD PRESSURE: 74 MMHG

## 2018-11-17 VITALS — SYSTOLIC BLOOD PRESSURE: 104 MMHG | DIASTOLIC BLOOD PRESSURE: 64 MMHG

## 2018-11-17 VITALS — SYSTOLIC BLOOD PRESSURE: 100 MMHG | DIASTOLIC BLOOD PRESSURE: 66 MMHG

## 2018-11-17 VITALS — SYSTOLIC BLOOD PRESSURE: 128 MMHG | DIASTOLIC BLOOD PRESSURE: 83 MMHG

## 2018-11-17 VITALS — SYSTOLIC BLOOD PRESSURE: 138 MMHG | DIASTOLIC BLOOD PRESSURE: 93 MMHG

## 2018-11-17 RX ADMIN — ATENOLOL SCH MG: 25 TABLET ORAL at 21:38

## 2018-11-17 RX ADMIN — CLOPIDOGREL BISULFATE SCH MG: 75 TABLET, FILM COATED ORAL at 09:23

## 2018-11-17 RX ADMIN — SODIUM CHLORIDE SCH UNIT: 9 INJECTION, SOLUTION INTRAVENOUS at 12:44

## 2018-11-17 RX ADMIN — IPRATROPIUM BROMIDE AND ALBUTEROL SULFATE SCH UDVIAL: .5; 3 SOLUTION RESPIRATORY (INHALATION) at 11:08

## 2018-11-17 RX ADMIN — PIPERACILLIN SODIUM AND TAZOBACTAM SODIUM SCH MLS/HR: .375; 3 INJECTION, POWDER, LYOPHILIZED, FOR SOLUTION INTRAVENOUS at 06:02

## 2018-11-17 RX ADMIN — CHLORTHALIDONE SCH EACH: 50 TABLET ORAL at 21:00

## 2018-11-17 RX ADMIN — ATORVASTATIN CALCIUM SCH MG: 40 TABLET, FILM COATED ORAL at 21:38

## 2018-11-17 RX ADMIN — LEVOFLOXACIN SCH MG: 500 TABLET, FILM COATED ORAL at 12:38

## 2018-11-17 RX ADMIN — SODIUM CHLORIDE SCH UNIT: 9 INJECTION, SOLUTION INTRAVENOUS at 21:44

## 2018-11-17 RX ADMIN — ATENOLOL SCH MG: 25 TABLET ORAL at 09:24

## 2018-11-17 RX ADMIN — FUROSEMIDE SCH MG: 20 TABLET ORAL at 09:23

## 2018-11-17 RX ADMIN — IPRATROPIUM BROMIDE AND ALBUTEROL SULFATE SCH UDVIAL: .5; 3 SOLUTION RESPIRATORY (INHALATION) at 06:44

## 2018-11-17 RX ADMIN — PIPERACILLIN SODIUM AND TAZOBACTAM SODIUM SCH MLS/HR: .375; 3 INJECTION, POWDER, LYOPHILIZED, FOR SOLUTION INTRAVENOUS at 21:37

## 2018-11-17 RX ADMIN — METFORMIN HYDROCHLORIDE SCH MG: 500 TABLET, EXTENDED RELEASE ORAL at 21:37

## 2018-11-17 RX ADMIN — PANTOPRAZOLE SODIUM SCH MG: 40 TABLET, DELAYED RELEASE ORAL at 09:23

## 2018-11-17 RX ADMIN — CHLORTHALIDONE SCH EACH: 50 TABLET ORAL at 08:57

## 2018-11-17 RX ADMIN — LISINOPRIL SCH MG: 5 TABLET ORAL at 09:24

## 2018-11-17 RX ADMIN — ASPIRIN SCH MG: 81 TABLET, CHEWABLE ORAL at 09:23

## 2018-11-17 RX ADMIN — FUROSEMIDE SCH MG: 20 TABLET ORAL at 15:59

## 2018-11-17 RX ADMIN — METFORMIN HYDROCHLORIDE SCH MG: 500 TABLET, EXTENDED RELEASE ORAL at 09:23

## 2018-11-17 RX ADMIN — BUPIVACAINE HYDROCHLORIDE SCH MG: 2.5 INJECTION, SOLUTION EPIDURAL; INFILTRATION; INTRACAUDAL; PERINEURAL at 14:15

## 2018-11-17 RX ADMIN — PIPERACILLIN SODIUM AND TAZOBACTAM SODIUM SCH MLS/HR: .375; 3 INJECTION, POWDER, LYOPHILIZED, FOR SOLUTION INTRAVENOUS at 15:59

## 2018-11-17 RX ADMIN — SODIUM CHLORIDE SCH UNIT: 9 INJECTION, SOLUTION INTRAVENOUS at 17:49

## 2018-11-17 RX ADMIN — IPRATROPIUM BROMIDE AND ALBUTEROL SULFATE SCH UDVIAL: .5; 3 SOLUTION RESPIRATORY (INHALATION) at 18:55

## 2018-11-17 RX ADMIN — SODIUM CHLORIDE SCH UNIT: 9 INJECTION, SOLUTION INTRAVENOUS at 06:44

## 2018-11-18 VITALS — SYSTOLIC BLOOD PRESSURE: 139 MMHG | DIASTOLIC BLOOD PRESSURE: 79 MMHG

## 2018-11-18 VITALS — SYSTOLIC BLOOD PRESSURE: 108 MMHG | DIASTOLIC BLOOD PRESSURE: 66 MMHG

## 2018-11-18 VITALS — DIASTOLIC BLOOD PRESSURE: 90 MMHG | SYSTOLIC BLOOD PRESSURE: 146 MMHG

## 2018-11-18 VITALS — SYSTOLIC BLOOD PRESSURE: 114 MMHG | DIASTOLIC BLOOD PRESSURE: 71 MMHG

## 2018-11-18 VITALS — DIASTOLIC BLOOD PRESSURE: 72 MMHG | SYSTOLIC BLOOD PRESSURE: 115 MMHG

## 2018-11-18 VITALS — DIASTOLIC BLOOD PRESSURE: 50 MMHG | SYSTOLIC BLOOD PRESSURE: 90 MMHG

## 2018-11-18 VITALS — SYSTOLIC BLOOD PRESSURE: 108 MMHG | DIASTOLIC BLOOD PRESSURE: 70 MMHG

## 2018-11-18 RX ADMIN — PIPERACILLIN SODIUM AND TAZOBACTAM SODIUM SCH MLS/HR: .375; 3 INJECTION, POWDER, LYOPHILIZED, FOR SOLUTION INTRAVENOUS at 06:15

## 2018-11-18 RX ADMIN — IPRATROPIUM BROMIDE AND ALBUTEROL SULFATE SCH UDVIAL: .5; 3 SOLUTION RESPIRATORY (INHALATION) at 11:10

## 2018-11-18 RX ADMIN — ASPIRIN SCH MG: 81 TABLET, CHEWABLE ORAL at 10:17

## 2018-11-18 RX ADMIN — SODIUM CHLORIDE SCH UNIT: 9 INJECTION, SOLUTION INTRAVENOUS at 05:48

## 2018-11-18 RX ADMIN — SODIUM CHLORIDE SCH UNIT: 9 INJECTION, SOLUTION INTRAVENOUS at 12:14

## 2018-11-18 RX ADMIN — PIPERACILLIN SODIUM AND TAZOBACTAM SODIUM SCH MLS/HR: .375; 3 INJECTION, POWDER, LYOPHILIZED, FOR SOLUTION INTRAVENOUS at 21:58

## 2018-11-18 RX ADMIN — CHLORTHALIDONE SCH EACH: 50 TABLET ORAL at 21:00

## 2018-11-18 RX ADMIN — SODIUM CHLORIDE SCH UNIT: 9 INJECTION, SOLUTION INTRAVENOUS at 16:30

## 2018-11-18 RX ADMIN — ATORVASTATIN CALCIUM SCH MG: 40 TABLET, FILM COATED ORAL at 21:58

## 2018-11-18 RX ADMIN — ATENOLOL SCH MG: 25 TABLET ORAL at 10:16

## 2018-11-18 RX ADMIN — LISINOPRIL SCH MG: 5 TABLET ORAL at 10:17

## 2018-11-18 RX ADMIN — FUROSEMIDE SCH MG: 20 TABLET ORAL at 10:17

## 2018-11-18 RX ADMIN — IPRATROPIUM BROMIDE AND ALBUTEROL SULFATE SCH UDVIAL: .5; 3 SOLUTION RESPIRATORY (INHALATION) at 00:24

## 2018-11-18 RX ADMIN — PANTOPRAZOLE SODIUM SCH MG: 40 TABLET, DELAYED RELEASE ORAL at 10:16

## 2018-11-18 RX ADMIN — IPRATROPIUM BROMIDE AND ALBUTEROL SULFATE SCH UDVIAL: .5; 3 SOLUTION RESPIRATORY (INHALATION) at 18:33

## 2018-11-18 RX ADMIN — SODIUM CHLORIDE SCH UNIT: 9 INJECTION, SOLUTION INTRAVENOUS at 22:08

## 2018-11-18 RX ADMIN — CLOPIDOGREL BISULFATE SCH MG: 75 TABLET, FILM COATED ORAL at 10:16

## 2018-11-18 RX ADMIN — PIPERACILLIN SODIUM AND TAZOBACTAM SODIUM SCH MLS/HR: .375; 3 INJECTION, POWDER, LYOPHILIZED, FOR SOLUTION INTRAVENOUS at 15:13

## 2018-11-18 RX ADMIN — CHLORTHALIDONE SCH EACH: 50 TABLET ORAL at 08:35

## 2018-11-18 RX ADMIN — IPRATROPIUM BROMIDE AND ALBUTEROL SULFATE SCH UDVIAL: .5; 3 SOLUTION RESPIRATORY (INHALATION) at 06:12

## 2018-11-18 RX ADMIN — LEVOFLOXACIN SCH MG: 500 TABLET, FILM COATED ORAL at 12:11

## 2018-11-18 RX ADMIN — FUROSEMIDE SCH MG: 20 TABLET ORAL at 19:14

## 2018-11-18 RX ADMIN — ATENOLOL SCH MG: 25 TABLET ORAL at 21:59

## 2018-11-18 RX ADMIN — METFORMIN HYDROCHLORIDE SCH MG: 500 TABLET, EXTENDED RELEASE ORAL at 10:17

## 2018-11-18 RX ADMIN — METFORMIN HYDROCHLORIDE SCH MG: 500 TABLET, EXTENDED RELEASE ORAL at 21:58

## 2018-11-19 VITALS — DIASTOLIC BLOOD PRESSURE: 73 MMHG | SYSTOLIC BLOOD PRESSURE: 108 MMHG

## 2018-11-19 VITALS — DIASTOLIC BLOOD PRESSURE: 90 MMHG | SYSTOLIC BLOOD PRESSURE: 139 MMHG

## 2018-11-19 VITALS — DIASTOLIC BLOOD PRESSURE: 83 MMHG | SYSTOLIC BLOOD PRESSURE: 139 MMHG

## 2018-11-19 VITALS — SYSTOLIC BLOOD PRESSURE: 115 MMHG | DIASTOLIC BLOOD PRESSURE: 71 MMHG

## 2018-11-19 VITALS — DIASTOLIC BLOOD PRESSURE: 68 MMHG | SYSTOLIC BLOOD PRESSURE: 102 MMHG

## 2018-11-19 RX ADMIN — LEVOFLOXACIN SCH MG: 500 TABLET, FILM COATED ORAL at 12:04

## 2018-11-19 RX ADMIN — ATORVASTATIN CALCIUM SCH MG: 40 TABLET, FILM COATED ORAL at 21:50

## 2018-11-19 RX ADMIN — ATENOLOL SCH MG: 25 TABLET ORAL at 21:51

## 2018-11-19 RX ADMIN — PANTOPRAZOLE SODIUM SCH MG: 40 TABLET, DELAYED RELEASE ORAL at 10:26

## 2018-11-19 RX ADMIN — IPRATROPIUM BROMIDE AND ALBUTEROL SULFATE SCH UDVIAL: .5; 3 SOLUTION RESPIRATORY (INHALATION) at 18:26

## 2018-11-19 RX ADMIN — PIPERACILLIN SODIUM AND TAZOBACTAM SODIUM SCH MLS/HR: .375; 3 INJECTION, POWDER, LYOPHILIZED, FOR SOLUTION INTRAVENOUS at 05:29

## 2018-11-19 RX ADMIN — FUROSEMIDE SCH MG: 20 TABLET ORAL at 10:26

## 2018-11-19 RX ADMIN — IPRATROPIUM BROMIDE AND ALBUTEROL SULFATE SCH UDVIAL: .5; 3 SOLUTION RESPIRATORY (INHALATION) at 06:33

## 2018-11-19 RX ADMIN — ATENOLOL SCH MG: 25 TABLET ORAL at 10:26

## 2018-11-19 RX ADMIN — SODIUM CHLORIDE SCH GM: 9 INJECTION, SOLUTION INTRAVENOUS at 13:37

## 2018-11-19 RX ADMIN — CHLORTHALIDONE SCH EACH: 50 TABLET ORAL at 21:00

## 2018-11-19 RX ADMIN — SODIUM CHLORIDE SCH UNIT: 9 INJECTION, SOLUTION INTRAVENOUS at 11:30

## 2018-11-19 RX ADMIN — WATER SCH ML: 1 INJECTION INTRAMUSCULAR; INTRAVENOUS; SUBCUTANEOUS at 13:37

## 2018-11-19 RX ADMIN — LISINOPRIL SCH MG: 5 TABLET ORAL at 10:25

## 2018-11-19 RX ADMIN — SODIUM CHLORIDE SCH GM: 9 INJECTION, SOLUTION INTRAVENOUS at 21:52

## 2018-11-19 RX ADMIN — IPRATROPIUM BROMIDE AND ALBUTEROL SULFATE SCH UDVIAL: .5; 3 SOLUTION RESPIRATORY (INHALATION) at 11:07

## 2018-11-19 RX ADMIN — SODIUM CHLORIDE SCH UNIT: 9 INJECTION, SOLUTION INTRAVENOUS at 16:30

## 2018-11-19 RX ADMIN — SODIUM CHLORIDE SCH UNIT: 9 INJECTION, SOLUTION INTRAVENOUS at 21:00

## 2018-11-19 RX ADMIN — CLOPIDOGREL BISULFATE SCH MG: 75 TABLET, FILM COATED ORAL at 10:26

## 2018-11-19 RX ADMIN — METFORMIN HYDROCHLORIDE SCH MG: 500 TABLET, EXTENDED RELEASE ORAL at 21:50

## 2018-11-19 RX ADMIN — CHLORTHALIDONE SCH EACH: 50 TABLET ORAL at 09:00

## 2018-11-19 RX ADMIN — METFORMIN HYDROCHLORIDE SCH MG: 500 TABLET, EXTENDED RELEASE ORAL at 10:26

## 2018-11-19 RX ADMIN — IPRATROPIUM BROMIDE AND ALBUTEROL SULFATE SCH UDVIAL: .5; 3 SOLUTION RESPIRATORY (INHALATION) at 23:08

## 2018-11-19 RX ADMIN — IPRATROPIUM BROMIDE AND ALBUTEROL SULFATE SCH UDVIAL: .5; 3 SOLUTION RESPIRATORY (INHALATION) at 00:11

## 2018-11-19 RX ADMIN — WATER SCH ML: 1 INJECTION INTRAMUSCULAR; INTRAVENOUS; SUBCUTANEOUS at 21:51

## 2018-11-19 RX ADMIN — SODIUM CHLORIDE SCH UNIT: 9 INJECTION, SOLUTION INTRAVENOUS at 05:49

## 2018-11-19 RX ADMIN — ASPIRIN SCH MG: 81 TABLET, CHEWABLE ORAL at 10:27

## 2018-11-20 VITALS — DIASTOLIC BLOOD PRESSURE: 68 MMHG | SYSTOLIC BLOOD PRESSURE: 130 MMHG

## 2018-11-20 VITALS — SYSTOLIC BLOOD PRESSURE: 118 MMHG | DIASTOLIC BLOOD PRESSURE: 76 MMHG

## 2018-11-20 VITALS — DIASTOLIC BLOOD PRESSURE: 67 MMHG | SYSTOLIC BLOOD PRESSURE: 109 MMHG

## 2018-11-20 VITALS — SYSTOLIC BLOOD PRESSURE: 112 MMHG | DIASTOLIC BLOOD PRESSURE: 64 MMHG

## 2018-11-20 VITALS — SYSTOLIC BLOOD PRESSURE: 126 MMHG | DIASTOLIC BLOOD PRESSURE: 85 MMHG

## 2018-11-20 VITALS — SYSTOLIC BLOOD PRESSURE: 100 MMHG | DIASTOLIC BLOOD PRESSURE: 71 MMHG

## 2018-11-20 LAB
BUN SERPL-MCNC: 24 MG/DL (ref 7–18)
CHLORIDE SERPL-SCNC: 103 MMOL/L (ref 101–111)
CO2 SERPL-SCNC: 29 MMOL/L (ref 21–32)
CREAT SERPL-MCNC: 1.1 MG/DL (ref 0.5–1.5)
ERYTHROCYTE [DISTWIDTH] IN BLOOD BY AUTOMATED COUNT: 15.8 % (ref 11–15.5)
GFR SERPL CREATININE-BSD FRML MDRD: 73 ML/MIN (ref 60–?)
GLUCOSE SERPL-MCNC: 71 MG/DL (ref 70–105)
HCT VFR BLD AUTO: 33.2 % (ref 42–54)
MCH RBC QN AUTO: 32.8 PG (ref 27–33)
MCHC RBC AUTO-ENTMCNC: 33.7 G/DL (ref 32–36)
MCV RBC AUTO: 97.3 FL (ref 79–99)
NRBC BLD MANUAL-RTO: 0.1 % (ref 0–0.19)
PLATELET # BLD AUTO: 115 K/UL (ref 130–400)
POTASSIUM SERPL-SCNC: 3.8 MMOL/L (ref 3.5–5.1)
RBC # BLD AUTO: 3.42 MIL/UL (ref 4.5–6.2)
SODIUM SERPL-SCNC: 141 MMOL/L (ref 136–145)
WBC # BLD AUTO: 4.6 K/UL (ref 4.8–10.8)

## 2018-11-20 RX ADMIN — SODIUM CHLORIDE SCH UNIT: 9 INJECTION, SOLUTION INTRAVENOUS at 20:57

## 2018-11-20 RX ADMIN — CHLORTHALIDONE SCH EACH: 50 TABLET ORAL at 20:57

## 2018-11-20 RX ADMIN — SODIUM CHLORIDE SCH UNIT: 9 INJECTION, SOLUTION INTRAVENOUS at 16:30

## 2018-11-20 RX ADMIN — IPRATROPIUM BROMIDE AND ALBUTEROL SULFATE SCH UDVIAL: .5; 3 SOLUTION RESPIRATORY (INHALATION) at 18:01

## 2018-11-20 RX ADMIN — FUROSEMIDE SCH MG: 20 TABLET ORAL at 10:14

## 2018-11-20 RX ADMIN — CHLORTHALIDONE SCH EACH: 50 TABLET ORAL at 09:00

## 2018-11-20 RX ADMIN — WATER SCH ML: 1 INJECTION INTRAMUSCULAR; INTRAVENOUS; SUBCUTANEOUS at 20:43

## 2018-11-20 RX ADMIN — IPRATROPIUM BROMIDE AND ALBUTEROL SULFATE SCH UDVIAL: .5; 3 SOLUTION RESPIRATORY (INHALATION) at 23:10

## 2018-11-20 RX ADMIN — IPRATROPIUM BROMIDE AND ALBUTEROL SULFATE SCH UDVIAL: .5; 3 SOLUTION RESPIRATORY (INHALATION) at 11:13

## 2018-11-20 RX ADMIN — WATER SCH ML: 1 INJECTION INTRAMUSCULAR; INTRAVENOUS; SUBCUTANEOUS at 05:07

## 2018-11-20 RX ADMIN — METFORMIN HYDROCHLORIDE SCH MG: 500 TABLET, EXTENDED RELEASE ORAL at 20:43

## 2018-11-20 RX ADMIN — ATORVASTATIN CALCIUM SCH MG: 40 TABLET, FILM COATED ORAL at 20:42

## 2018-11-20 RX ADMIN — IPRATROPIUM BROMIDE AND ALBUTEROL SULFATE SCH UDVIAL: .5; 3 SOLUTION RESPIRATORY (INHALATION) at 06:45

## 2018-11-20 RX ADMIN — CLOPIDOGREL BISULFATE SCH MG: 75 TABLET, FILM COATED ORAL at 10:14

## 2018-11-20 RX ADMIN — PANTOPRAZOLE SODIUM SCH MG: 40 TABLET, DELAYED RELEASE ORAL at 10:14

## 2018-11-20 RX ADMIN — SODIUM CHLORIDE SCH UNIT: 9 INJECTION, SOLUTION INTRAVENOUS at 11:30

## 2018-11-20 RX ADMIN — WATER SCH ML: 1 INJECTION INTRAMUSCULAR; INTRAVENOUS; SUBCUTANEOUS at 14:56

## 2018-11-20 RX ADMIN — METFORMIN HYDROCHLORIDE SCH MG: 500 TABLET, EXTENDED RELEASE ORAL at 10:14

## 2018-11-20 RX ADMIN — LISINOPRIL SCH MG: 5 TABLET ORAL at 10:19

## 2018-11-20 RX ADMIN — FUROSEMIDE SCH MG: 20 TABLET ORAL at 17:11

## 2018-11-20 RX ADMIN — ATENOLOL SCH MG: 25 TABLET ORAL at 20:43

## 2018-11-20 RX ADMIN — FUROSEMIDE SCH MG: 20 TABLET ORAL at 20:36

## 2018-11-20 RX ADMIN — ATENOLOL SCH MG: 25 TABLET ORAL at 10:15

## 2018-11-20 RX ADMIN — ASPIRIN SCH MG: 81 TABLET, CHEWABLE ORAL at 10:13

## 2018-11-20 RX ADMIN — SODIUM CHLORIDE SCH GM: 9 INJECTION, SOLUTION INTRAVENOUS at 05:07

## 2018-11-20 RX ADMIN — SODIUM CHLORIDE SCH GM: 9 INJECTION, SOLUTION INTRAVENOUS at 20:43

## 2018-11-20 RX ADMIN — SODIUM CHLORIDE SCH GM: 9 INJECTION, SOLUTION INTRAVENOUS at 14:56

## 2018-11-21 VITALS — DIASTOLIC BLOOD PRESSURE: 68 MMHG | SYSTOLIC BLOOD PRESSURE: 111 MMHG

## 2018-11-21 VITALS — SYSTOLIC BLOOD PRESSURE: 125 MMHG | DIASTOLIC BLOOD PRESSURE: 91 MMHG

## 2018-11-21 VITALS — SYSTOLIC BLOOD PRESSURE: 132 MMHG | DIASTOLIC BLOOD PRESSURE: 62 MMHG

## 2018-11-21 VITALS — SYSTOLIC BLOOD PRESSURE: 148 MMHG | DIASTOLIC BLOOD PRESSURE: 86 MMHG

## 2018-11-21 VITALS — DIASTOLIC BLOOD PRESSURE: 65 MMHG | SYSTOLIC BLOOD PRESSURE: 123 MMHG

## 2018-11-21 VITALS — DIASTOLIC BLOOD PRESSURE: 90 MMHG | SYSTOLIC BLOOD PRESSURE: 135 MMHG

## 2018-11-21 LAB
BASOPHILS NFR BLD AUTO: 0.6 % (ref 0–5)
BUN SERPL-MCNC: 27 MG/DL (ref 7–18)
CHLORIDE SERPL-SCNC: 102 MMOL/L (ref 101–111)
CO2 SERPL-SCNC: 27 MMOL/L (ref 21–32)
CREAT SERPL-MCNC: 1 MG/DL (ref 0.5–1.5)
EOSINOPHIL NFR BLD AUTO: 1.8 % (ref 0–8)
ERYTHROCYTE [DISTWIDTH] IN BLOOD BY AUTOMATED COUNT: 15.5 % (ref 11–15.5)
GFR SERPL CREATININE-BSD FRML MDRD: 82 ML/MIN (ref 60–?)
GLUCOSE SERPL-MCNC: 79 MG/DL (ref 70–105)
HCT VFR BLD AUTO: 32 % (ref 42–54)
LYMPHOCYTES NFR SPEC AUTO: 26.5 % (ref 21–51)
MCH RBC QN AUTO: 33.5 PG (ref 27–33)
MCHC RBC AUTO-ENTMCNC: 34.8 G/DL (ref 32–36)
MCV RBC AUTO: 96.2 FL (ref 79–99)
MONOCYTES NFR BLD AUTO: 13.1 % (ref 3–13)
NEUTROPHILS NFR BLD AUTO: 58 % (ref 40–77)
NRBC BLD MANUAL-RTO: 0 % (ref 0–0.19)
PLATELET # BLD AUTO: 123 K/UL (ref 130–400)
POTASSIUM SERPL-SCNC: 3.7 MMOL/L (ref 3.5–5.1)
RBC # BLD AUTO: 3.33 MIL/UL (ref 4.5–6.2)
SODIUM SERPL-SCNC: 139 MMOL/L (ref 136–145)
WBC # BLD AUTO: 5.2 K/UL (ref 4.8–10.8)

## 2018-11-21 RX ADMIN — CLOPIDOGREL BISULFATE SCH MG: 75 TABLET, FILM COATED ORAL at 08:56

## 2018-11-21 RX ADMIN — ATENOLOL SCH MG: 25 TABLET ORAL at 20:37

## 2018-11-21 RX ADMIN — PANTOPRAZOLE SODIUM SCH MG: 40 TABLET, DELAYED RELEASE ORAL at 08:56

## 2018-11-21 RX ADMIN — WATER SCH ML: 1 INJECTION INTRAMUSCULAR; INTRAVENOUS; SUBCUTANEOUS at 05:15

## 2018-11-21 RX ADMIN — CHLORTHALIDONE SCH EACH: 50 TABLET ORAL at 20:45

## 2018-11-21 RX ADMIN — FUROSEMIDE SCH MG: 20 TABLET ORAL at 08:55

## 2018-11-21 RX ADMIN — ATENOLOL SCH MG: 25 TABLET ORAL at 08:56

## 2018-11-21 RX ADMIN — FUROSEMIDE SCH MG: 20 TABLET ORAL at 19:30

## 2018-11-21 RX ADMIN — ASPIRIN SCH MG: 81 TABLET, CHEWABLE ORAL at 08:56

## 2018-11-21 RX ADMIN — SODIUM CHLORIDE SCH GM: 9 INJECTION, SOLUTION INTRAVENOUS at 14:05

## 2018-11-21 RX ADMIN — ATORVASTATIN CALCIUM SCH MG: 40 TABLET, FILM COATED ORAL at 20:36

## 2018-11-21 RX ADMIN — SODIUM CHLORIDE SCH GM: 9 INJECTION, SOLUTION INTRAVENOUS at 20:36

## 2018-11-21 RX ADMIN — SODIUM CHLORIDE SCH UNIT: 9 INJECTION, SOLUTION INTRAVENOUS at 05:34

## 2018-11-21 RX ADMIN — METFORMIN HYDROCHLORIDE SCH MG: 500 TABLET, EXTENDED RELEASE ORAL at 20:37

## 2018-11-21 RX ADMIN — SODIUM CHLORIDE SCH GM: 9 INJECTION, SOLUTION INTRAVENOUS at 05:15

## 2018-11-21 RX ADMIN — METFORMIN HYDROCHLORIDE SCH MG: 500 TABLET, EXTENDED RELEASE ORAL at 08:55

## 2018-11-21 RX ADMIN — WATER SCH ML: 1 INJECTION INTRAMUSCULAR; INTRAVENOUS; SUBCUTANEOUS at 14:05

## 2018-11-21 RX ADMIN — WATER SCH ML: 1 INJECTION INTRAMUSCULAR; INTRAVENOUS; SUBCUTANEOUS at 20:36

## 2018-11-21 RX ADMIN — CHLORTHALIDONE SCH EACH: 50 TABLET ORAL at 09:00

## 2018-11-21 RX ADMIN — IPRATROPIUM BROMIDE AND ALBUTEROL SULFATE SCH UDVIAL: .5; 3 SOLUTION RESPIRATORY (INHALATION) at 06:36

## 2018-11-21 RX ADMIN — SODIUM CHLORIDE SCH UNIT: 9 INJECTION, SOLUTION INTRAVENOUS at 20:37

## 2018-11-21 RX ADMIN — LISINOPRIL SCH MG: 5 TABLET ORAL at 08:56

## 2018-11-21 RX ADMIN — SODIUM CHLORIDE SCH UNIT: 9 INJECTION, SOLUTION INTRAVENOUS at 11:30

## 2018-11-21 RX ADMIN — SODIUM CHLORIDE SCH UNIT: 9 INJECTION, SOLUTION INTRAVENOUS at 16:30

## 2018-11-22 VITALS — DIASTOLIC BLOOD PRESSURE: 68 MMHG | SYSTOLIC BLOOD PRESSURE: 109 MMHG

## 2018-11-22 VITALS — DIASTOLIC BLOOD PRESSURE: 84 MMHG | SYSTOLIC BLOOD PRESSURE: 121 MMHG

## 2018-11-22 VITALS — SYSTOLIC BLOOD PRESSURE: 124 MMHG | DIASTOLIC BLOOD PRESSURE: 68 MMHG

## 2018-11-22 VITALS — SYSTOLIC BLOOD PRESSURE: 124 MMHG | DIASTOLIC BLOOD PRESSURE: 87 MMHG

## 2018-11-22 VITALS — SYSTOLIC BLOOD PRESSURE: 110 MMHG | DIASTOLIC BLOOD PRESSURE: 68 MMHG

## 2018-11-22 LAB
BUN SERPL-MCNC: 25 MG/DL (ref 7–18)
CHLORIDE SERPL-SCNC: 104 MMOL/L (ref 101–111)
CO2 SERPL-SCNC: 30 MMOL/L (ref 21–32)
CREAT SERPL-MCNC: 1.1 MG/DL (ref 0.5–1.5)
ERYTHROCYTE [DISTWIDTH] IN BLOOD BY AUTOMATED COUNT: 15.3 % (ref 11–15.5)
GFR SERPL CREATININE-BSD FRML MDRD: 73 ML/MIN (ref 60–?)
GLUCOSE SERPL-MCNC: 102 MG/DL (ref 70–105)
HCT VFR BLD AUTO: 33.9 % (ref 42–54)
MCH RBC QN AUTO: 31.9 PG (ref 27–33)
MCHC RBC AUTO-ENTMCNC: 33.2 G/DL (ref 32–36)
MCV RBC AUTO: 96.1 FL (ref 79–99)
NRBC BLD MANUAL-RTO: 0.1 % (ref 0–0.19)
PLATELET # BLD AUTO: 117 K/UL (ref 130–400)
POTASSIUM SERPL-SCNC: 4 MMOL/L (ref 3.5–5.1)
RBC # BLD AUTO: 3.53 MIL/UL (ref 4.5–6.2)
SODIUM SERPL-SCNC: 141 MMOL/L (ref 136–145)
WBC # BLD AUTO: 4 K/UL (ref 4.8–10.8)

## 2018-11-22 RX ADMIN — ASPIRIN SCH MG: 81 TABLET, CHEWABLE ORAL at 08:39

## 2018-11-22 RX ADMIN — SODIUM CHLORIDE SCH UNIT: 9 INJECTION, SOLUTION INTRAVENOUS at 11:30

## 2018-11-22 RX ADMIN — ATENOLOL SCH MG: 25 TABLET ORAL at 21:00

## 2018-11-22 RX ADMIN — CHLORTHALIDONE SCH EACH: 50 TABLET ORAL at 21:00

## 2018-11-22 RX ADMIN — CHLORTHALIDONE SCH EACH: 50 TABLET ORAL at 09:00

## 2018-11-22 RX ADMIN — FUROSEMIDE SCH MG: 20 TABLET ORAL at 08:39

## 2018-11-22 RX ADMIN — SODIUM CHLORIDE SCH UNIT: 9 INJECTION, SOLUTION INTRAVENOUS at 16:30

## 2018-11-22 RX ADMIN — WATER SCH ML: 1 INJECTION INTRAMUSCULAR; INTRAVENOUS; SUBCUTANEOUS at 04:48

## 2018-11-22 RX ADMIN — ATENOLOL SCH MG: 25 TABLET ORAL at 08:39

## 2018-11-22 RX ADMIN — METFORMIN HYDROCHLORIDE SCH MG: 500 TABLET, EXTENDED RELEASE ORAL at 08:39

## 2018-11-22 RX ADMIN — METFORMIN HYDROCHLORIDE SCH MG: 500 TABLET, EXTENDED RELEASE ORAL at 21:00

## 2018-11-22 RX ADMIN — ATORVASTATIN CALCIUM SCH MG: 40 TABLET, FILM COATED ORAL at 21:00

## 2018-11-22 RX ADMIN — FUROSEMIDE SCH MG: 20 TABLET ORAL at 18:12

## 2018-11-22 RX ADMIN — SODIUM CHLORIDE SCH UNIT: 9 INJECTION, SOLUTION INTRAVENOUS at 06:12

## 2018-11-22 RX ADMIN — SODIUM CHLORIDE SCH UNIT: 9 INJECTION, SOLUTION INTRAVENOUS at 21:00

## 2018-11-22 RX ADMIN — CLOPIDOGREL BISULFATE SCH MG: 75 TABLET, FILM COATED ORAL at 08:39

## 2018-11-22 RX ADMIN — LISINOPRIL SCH MG: 5 TABLET ORAL at 08:38

## 2018-11-22 RX ADMIN — PANTOPRAZOLE SODIUM SCH MG: 40 TABLET, DELAYED RELEASE ORAL at 08:39

## 2018-11-22 RX ADMIN — SODIUM CHLORIDE SCH GM: 9 INJECTION, SOLUTION INTRAVENOUS at 04:48

## 2018-11-22 RX ADMIN — CEFTRIAXONE SODIUM SCH GM: 2 INJECTION, POWDER, FOR SOLUTION INTRAMUSCULAR; INTRAVENOUS at 08:38

## 2018-11-23 VITALS — SYSTOLIC BLOOD PRESSURE: 102 MMHG | DIASTOLIC BLOOD PRESSURE: 64 MMHG

## 2018-11-23 VITALS — DIASTOLIC BLOOD PRESSURE: 72 MMHG | SYSTOLIC BLOOD PRESSURE: 111 MMHG

## 2018-11-23 VITALS — DIASTOLIC BLOOD PRESSURE: 64 MMHG | SYSTOLIC BLOOD PRESSURE: 97 MMHG

## 2018-11-23 VITALS — SYSTOLIC BLOOD PRESSURE: 103 MMHG | DIASTOLIC BLOOD PRESSURE: 67 MMHG

## 2018-11-23 VITALS — SYSTOLIC BLOOD PRESSURE: 108 MMHG | DIASTOLIC BLOOD PRESSURE: 67 MMHG

## 2018-11-23 VITALS — DIASTOLIC BLOOD PRESSURE: 60 MMHG | SYSTOLIC BLOOD PRESSURE: 103 MMHG

## 2018-11-23 LAB
BUN SERPL-MCNC: 24 MG/DL (ref 7–18)
CHLORIDE SERPL-SCNC: 103 MMOL/L (ref 101–111)
CO2 SERPL-SCNC: 29 MMOL/L (ref 21–32)
CREAT SERPL-MCNC: 1 MG/DL (ref 0.5–1.5)
ERYTHROCYTE [DISTWIDTH] IN BLOOD BY AUTOMATED COUNT: 14.9 % (ref 11–15.5)
GFR SERPL CREATININE-BSD FRML MDRD: 82 ML/MIN (ref 60–?)
GLUCOSE SERPL-MCNC: 159 MG/DL (ref 70–105)
HCT VFR BLD AUTO: 31.5 % (ref 42–54)
MCH RBC QN AUTO: 33.2 PG (ref 27–33)
MCHC RBC AUTO-ENTMCNC: 34.5 G/DL (ref 32–36)
MCV RBC AUTO: 96.2 FL (ref 79–99)
NRBC BLD MANUAL-RTO: 0 % (ref 0–0.19)
PLATELET # BLD AUTO: 115 K/UL (ref 130–400)
POTASSIUM SERPL-SCNC: 3.8 MMOL/L (ref 3.5–5.1)
RBC # BLD AUTO: 3.28 MIL/UL (ref 4.5–6.2)
SODIUM SERPL-SCNC: 138 MMOL/L (ref 136–145)
WBC # BLD AUTO: 4.2 K/UL (ref 4.8–10.8)

## 2018-11-23 RX ADMIN — SODIUM CHLORIDE SCH UNIT: 9 INJECTION, SOLUTION INTRAVENOUS at 07:30

## 2018-11-23 RX ADMIN — SODIUM CHLORIDE SCH UNIT: 9 INJECTION, SOLUTION INTRAVENOUS at 21:00

## 2018-11-23 RX ADMIN — FUROSEMIDE SCH MG: 20 TABLET ORAL at 09:10

## 2018-11-23 RX ADMIN — METFORMIN HYDROCHLORIDE SCH MG: 500 TABLET, EXTENDED RELEASE ORAL at 20:57

## 2018-11-23 RX ADMIN — CLOPIDOGREL BISULFATE SCH MG: 75 TABLET, FILM COATED ORAL at 09:10

## 2018-11-23 RX ADMIN — PANTOPRAZOLE SODIUM SCH MG: 40 TABLET, DELAYED RELEASE ORAL at 09:07

## 2018-11-23 RX ADMIN — FUROSEMIDE SCH MG: 20 TABLET ORAL at 17:24

## 2018-11-23 RX ADMIN — ATENOLOL SCH MG: 25 TABLET ORAL at 21:01

## 2018-11-23 RX ADMIN — SODIUM CHLORIDE SCH UNIT: 9 INJECTION, SOLUTION INTRAVENOUS at 12:06

## 2018-11-23 RX ADMIN — VANCOMYCIN HYDROCHLORIDE SCH ML: 1 INJECTION, POWDER, LYOPHILIZED, FOR SOLUTION INTRAVENOUS at 23:51

## 2018-11-23 RX ADMIN — CHLORTHALIDONE SCH EACH: 50 TABLET ORAL at 09:00

## 2018-11-23 RX ADMIN — ATORVASTATIN CALCIUM SCH MG: 40 TABLET, FILM COATED ORAL at 20:58

## 2018-11-23 RX ADMIN — ASPIRIN SCH MG: 81 TABLET, CHEWABLE ORAL at 09:08

## 2018-11-23 RX ADMIN — CEFTRIAXONE SODIUM SCH GM: 2 INJECTION, POWDER, FOR SOLUTION INTRAMUSCULAR; INTRAVENOUS at 09:07

## 2018-11-23 RX ADMIN — METFORMIN HYDROCHLORIDE SCH MG: 500 TABLET, EXTENDED RELEASE ORAL at 09:10

## 2018-11-23 RX ADMIN — LISINOPRIL SCH MG: 5 TABLET ORAL at 09:09

## 2018-11-23 RX ADMIN — VANCOMYCIN HYDROCHLORIDE SCH ML: 1 INJECTION, POWDER, LYOPHILIZED, FOR SOLUTION INTRAVENOUS at 17:24

## 2018-11-23 RX ADMIN — ATENOLOL SCH MG: 25 TABLET ORAL at 09:00

## 2018-11-23 RX ADMIN — VANCOMYCIN HYDROCHLORIDE SCH ML: 1 INJECTION, POWDER, LYOPHILIZED, FOR SOLUTION INTRAVENOUS at 13:44

## 2018-11-23 RX ADMIN — SODIUM CHLORIDE SCH UNIT: 9 INJECTION, SOLUTION INTRAVENOUS at 16:30

## 2018-11-24 VITALS — DIASTOLIC BLOOD PRESSURE: 87 MMHG | SYSTOLIC BLOOD PRESSURE: 138 MMHG

## 2018-11-24 VITALS — DIASTOLIC BLOOD PRESSURE: 58 MMHG | SYSTOLIC BLOOD PRESSURE: 92 MMHG

## 2018-11-24 VITALS — DIASTOLIC BLOOD PRESSURE: 73 MMHG | SYSTOLIC BLOOD PRESSURE: 110 MMHG

## 2018-11-24 VITALS — DIASTOLIC BLOOD PRESSURE: 74 MMHG | SYSTOLIC BLOOD PRESSURE: 120 MMHG

## 2018-11-24 LAB
BUN SERPL-MCNC: 19 MG/DL (ref 7–18)
CHLORIDE SERPL-SCNC: 103 MMOL/L (ref 101–111)
CO2 SERPL-SCNC: 32 MMOL/L (ref 21–32)
CREAT SERPL-MCNC: 1 MG/DL (ref 0.5–1.5)
ERYTHROCYTE [DISTWIDTH] IN BLOOD BY AUTOMATED COUNT: 15.2 % (ref 11–15.5)
GFR SERPL CREATININE-BSD FRML MDRD: 82 ML/MIN (ref 60–?)
GLUCOSE SERPL-MCNC: 147 MG/DL (ref 70–105)
HCT VFR BLD AUTO: 32.9 % (ref 42–54)
MCH RBC QN AUTO: 32.2 PG (ref 27–33)
MCHC RBC AUTO-ENTMCNC: 33.4 G/DL (ref 32–36)
MCV RBC AUTO: 96.3 FL (ref 79–99)
NRBC BLD MANUAL-RTO: 0 % (ref 0–0.19)
PLATELET # BLD AUTO: 111 K/UL (ref 130–400)
POTASSIUM SERPL-SCNC: 3.8 MMOL/L (ref 3.5–5.1)
RBC # BLD AUTO: 3.42 MIL/UL (ref 4.5–6.2)
SODIUM SERPL-SCNC: 140 MMOL/L (ref 136–145)
WBC # BLD AUTO: 3.9 K/UL (ref 4.8–10.8)

## 2018-11-24 RX ADMIN — LISINOPRIL SCH MG: 5 TABLET ORAL at 08:47

## 2018-11-24 RX ADMIN — ATENOLOL SCH MG: 25 TABLET ORAL at 08:48

## 2018-11-24 RX ADMIN — VANCOMYCIN HYDROCHLORIDE SCH ML: 1 INJECTION, POWDER, LYOPHILIZED, FOR SOLUTION INTRAVENOUS at 13:03

## 2018-11-24 RX ADMIN — SODIUM CHLORIDE SCH UNIT: 9 INJECTION, SOLUTION INTRAVENOUS at 11:30

## 2018-11-24 RX ADMIN — METFORMIN HYDROCHLORIDE SCH MG: 500 TABLET, EXTENDED RELEASE ORAL at 08:47

## 2018-11-24 RX ADMIN — ASPIRIN SCH MG: 81 TABLET, CHEWABLE ORAL at 08:47

## 2018-11-24 RX ADMIN — CEFTRIAXONE SODIUM SCH GM: 2 INJECTION, POWDER, FOR SOLUTION INTRAMUSCULAR; INTRAVENOUS at 09:05

## 2018-11-24 RX ADMIN — PANTOPRAZOLE SODIUM SCH MG: 40 TABLET, DELAYED RELEASE ORAL at 08:47

## 2018-11-24 RX ADMIN — VANCOMYCIN HYDROCHLORIDE SCH ML: 1 INJECTION, POWDER, LYOPHILIZED, FOR SOLUTION INTRAVENOUS at 07:01

## 2018-11-24 RX ADMIN — CHLORTHALIDONE SCH EACH: 50 TABLET ORAL at 08:51

## 2018-11-24 RX ADMIN — CLOPIDOGREL BISULFATE SCH MG: 75 TABLET, FILM COATED ORAL at 08:47

## 2018-11-24 RX ADMIN — FUROSEMIDE SCH MG: 20 TABLET ORAL at 08:47

## 2018-11-24 RX ADMIN — SODIUM CHLORIDE SCH UNIT: 9 INJECTION, SOLUTION INTRAVENOUS at 07:04

## 2018-12-05 ENCOUNTER — HOSPITAL ENCOUNTER (OUTPATIENT)
Dept: HOSPITAL 90 - WHH | Age: 58
Discharge: HOME | End: 2018-12-05
Attending: FAMILY MEDICINE
Payer: COMMERCIAL

## 2018-12-05 VITALS — SYSTOLIC BLOOD PRESSURE: 139 MMHG | DIASTOLIC BLOOD PRESSURE: 91 MMHG

## 2018-12-05 DIAGNOSIS — Z79.82: ICD-10-CM

## 2018-12-05 DIAGNOSIS — E11.69: ICD-10-CM

## 2018-12-05 DIAGNOSIS — I25.10: ICD-10-CM

## 2018-12-05 DIAGNOSIS — E11.51: ICD-10-CM

## 2018-12-05 DIAGNOSIS — N18.2: ICD-10-CM

## 2018-12-05 DIAGNOSIS — Z88.5: ICD-10-CM

## 2018-12-05 DIAGNOSIS — Z85.89: ICD-10-CM

## 2018-12-05 DIAGNOSIS — E11.621: Primary | ICD-10-CM

## 2018-12-05 DIAGNOSIS — I12.9: ICD-10-CM

## 2018-12-05 DIAGNOSIS — E11.22: ICD-10-CM

## 2018-12-05 DIAGNOSIS — Z86.73: ICD-10-CM

## 2018-12-05 DIAGNOSIS — Z79.4: ICD-10-CM

## 2018-12-05 DIAGNOSIS — E11.21: ICD-10-CM

## 2018-12-05 DIAGNOSIS — F41.9: ICD-10-CM

## 2018-12-05 DIAGNOSIS — Z85.828: ICD-10-CM

## 2018-12-05 DIAGNOSIS — Z88.6: ICD-10-CM

## 2018-12-05 DIAGNOSIS — M86.672: ICD-10-CM

## 2018-12-05 DIAGNOSIS — M19.072: ICD-10-CM

## 2018-12-05 DIAGNOSIS — E11.319: ICD-10-CM

## 2018-12-05 DIAGNOSIS — I25.2: ICD-10-CM

## 2018-12-05 DIAGNOSIS — K21.9: ICD-10-CM

## 2018-12-05 DIAGNOSIS — E78.2: ICD-10-CM

## 2018-12-05 DIAGNOSIS — L97.521: ICD-10-CM

## 2018-12-05 DIAGNOSIS — E11.42: ICD-10-CM

## 2018-12-05 DIAGNOSIS — Z95.1: ICD-10-CM

## 2018-12-05 DIAGNOSIS — Z79.899: ICD-10-CM

## 2018-12-05 PROCEDURE — 99214 OFFICE O/P EST MOD 30 MIN: CPT

## 2018-12-26 ENCOUNTER — HOSPITAL ENCOUNTER (OUTPATIENT)
Dept: HOSPITAL 90 - WHH | Age: 58
Discharge: HOME | End: 2018-12-26
Attending: PODIATRIST
Payer: COMMERCIAL

## 2018-12-26 VITALS — SYSTOLIC BLOOD PRESSURE: 111 MMHG | DIASTOLIC BLOOD PRESSURE: 70 MMHG

## 2018-12-26 DIAGNOSIS — Z93.0: ICD-10-CM

## 2018-12-26 DIAGNOSIS — I13.0: ICD-10-CM

## 2018-12-26 DIAGNOSIS — Z79.82: ICD-10-CM

## 2018-12-26 DIAGNOSIS — Z95.1: ICD-10-CM

## 2018-12-26 DIAGNOSIS — E11.51: ICD-10-CM

## 2018-12-26 DIAGNOSIS — E11.36: ICD-10-CM

## 2018-12-26 DIAGNOSIS — Z79.899: ICD-10-CM

## 2018-12-26 DIAGNOSIS — N18.2: ICD-10-CM

## 2018-12-26 DIAGNOSIS — M19.072: ICD-10-CM

## 2018-12-26 DIAGNOSIS — E66.9: ICD-10-CM

## 2018-12-26 DIAGNOSIS — Z85.830: ICD-10-CM

## 2018-12-26 DIAGNOSIS — Z79.4: ICD-10-CM

## 2018-12-26 DIAGNOSIS — K21.9: ICD-10-CM

## 2018-12-26 DIAGNOSIS — Z88.6: ICD-10-CM

## 2018-12-26 DIAGNOSIS — I50.31: ICD-10-CM

## 2018-12-26 DIAGNOSIS — E11.42: ICD-10-CM

## 2018-12-26 DIAGNOSIS — I25.10: ICD-10-CM

## 2018-12-26 DIAGNOSIS — E11.21: ICD-10-CM

## 2018-12-26 DIAGNOSIS — E11.22: ICD-10-CM

## 2018-12-26 DIAGNOSIS — Z88.8: ICD-10-CM

## 2018-12-26 DIAGNOSIS — Z86.73: ICD-10-CM

## 2018-12-26 DIAGNOSIS — M86.672: ICD-10-CM

## 2018-12-26 DIAGNOSIS — Z85.828: ICD-10-CM

## 2018-12-26 DIAGNOSIS — I25.2: ICD-10-CM

## 2018-12-26 DIAGNOSIS — E11.319: ICD-10-CM

## 2018-12-26 DIAGNOSIS — Z88.5: ICD-10-CM

## 2018-12-26 DIAGNOSIS — L97.528: ICD-10-CM

## 2018-12-26 DIAGNOSIS — F41.9: ICD-10-CM

## 2018-12-26 DIAGNOSIS — G89.29: ICD-10-CM

## 2018-12-26 DIAGNOSIS — E11.69: ICD-10-CM

## 2018-12-26 DIAGNOSIS — E11.621: Primary | ICD-10-CM

## 2018-12-26 DIAGNOSIS — E78.2: ICD-10-CM

## 2018-12-26 PROCEDURE — 97597 DBRDMT OPN WND 1ST 20 CM/<: CPT

## 2018-12-26 PROCEDURE — 99214 OFFICE O/P EST MOD 30 MIN: CPT

## 2019-10-30 ENCOUNTER — HOSPITAL ENCOUNTER (OUTPATIENT)
Dept: HOSPITAL 90 - WHH | Age: 59
Discharge: HOME | End: 2019-10-30
Attending: PODIATRIST
Payer: COMMERCIAL

## 2019-10-30 VITALS — DIASTOLIC BLOOD PRESSURE: 78 MMHG | SYSTOLIC BLOOD PRESSURE: 125 MMHG

## 2019-10-30 DIAGNOSIS — Z79.899: ICD-10-CM

## 2019-10-30 DIAGNOSIS — Z79.82: ICD-10-CM

## 2019-10-30 DIAGNOSIS — E11.319: ICD-10-CM

## 2019-10-30 DIAGNOSIS — L97.521: ICD-10-CM

## 2019-10-30 DIAGNOSIS — Z95.1: ICD-10-CM

## 2019-10-30 DIAGNOSIS — E11.42: ICD-10-CM

## 2019-10-30 DIAGNOSIS — E11.51: ICD-10-CM

## 2019-10-30 DIAGNOSIS — I25.10: ICD-10-CM

## 2019-10-30 DIAGNOSIS — Z85.89: ICD-10-CM

## 2019-10-30 DIAGNOSIS — Z86.73: ICD-10-CM

## 2019-10-30 DIAGNOSIS — N18.2: ICD-10-CM

## 2019-10-30 DIAGNOSIS — E11.69: ICD-10-CM

## 2019-10-30 DIAGNOSIS — M19.072: ICD-10-CM

## 2019-10-30 DIAGNOSIS — M86.672: ICD-10-CM

## 2019-10-30 DIAGNOSIS — E11.22: ICD-10-CM

## 2019-10-30 DIAGNOSIS — I12.9: ICD-10-CM

## 2019-10-30 DIAGNOSIS — K21.9: ICD-10-CM

## 2019-10-30 DIAGNOSIS — E78.2: ICD-10-CM

## 2019-10-30 DIAGNOSIS — Z85.828: ICD-10-CM

## 2019-10-30 DIAGNOSIS — Z88.5: ICD-10-CM

## 2019-10-30 DIAGNOSIS — Z79.4: ICD-10-CM

## 2019-10-30 DIAGNOSIS — E11.621: Primary | ICD-10-CM

## 2019-10-30 DIAGNOSIS — L02.612: ICD-10-CM

## 2019-10-30 DIAGNOSIS — I25.2: ICD-10-CM

## 2019-10-30 DIAGNOSIS — Z88.6: ICD-10-CM

## 2019-10-30 DIAGNOSIS — E11.21: ICD-10-CM

## 2019-10-30 LAB
ALBUMIN SERPL-MCNC: 3.4 G/DL (ref 3.5–5)
ALT SERPL-CCNC: 17 U/L (ref 12–78)
AST SERPL-CCNC: 18 U/L (ref 10–37)
BASOPHILS NFR BLD AUTO: 1.2 % (ref 0–5)
BILIRUB SERPL-MCNC: 0.8 MG/DL (ref 0.2–1)
BUN SERPL-MCNC: 38 MG/DL (ref 7–18)
CHLORIDE SERPL-SCNC: 97 MMOL/L (ref 101–111)
CO2 SERPL-SCNC: 31 MMOL/L (ref 21–32)
CREAT SERPL-MCNC: 1.6 MG/DL (ref 0.5–1.5)
EOSINOPHIL NFR BLD AUTO: 0.8 % (ref 0–8)
ERYTHROCYTE [DISTWIDTH] IN BLOOD BY AUTOMATED COUNT: 14 % (ref 11–15.5)
GFR SERPL CREATININE-BSD FRML MDRD: 47 ML/MIN (ref 60–?)
GLUCOSE SERPL-MCNC: 192 MG/DL (ref 70–105)
HCT VFR BLD AUTO: 40.5 % (ref 42–54)
LYMPHOCYTES NFR SPEC AUTO: 12.8 % (ref 21–51)
MCH RBC QN AUTO: 33.8 PG (ref 27–33)
MCHC RBC AUTO-ENTMCNC: 34.1 G/DL (ref 32–36)
MCV RBC AUTO: 99.3 FL (ref 79–99)
MONOCYTES NFR BLD AUTO: 9.5 % (ref 3–13)
NEUTROPHILS NFR BLD AUTO: 75.7 % (ref 40–77)
NRBC BLD MANUAL-RTO: 0 % (ref 0–0.19)
PLATELET # BLD AUTO: 156 K/UL (ref 130–400)
POTASSIUM SERPL-SCNC: 4.3 MMOL/L (ref 3.5–5.1)
PROT SERPL-MCNC: 8.7 G/DL (ref 6–8.3)
RBC # BLD AUTO: 4.08 MIL/UL (ref 4.5–6.2)
SODIUM SERPL-SCNC: 137 MMOL/L (ref 136–145)
WBC # BLD AUTO: 10.1 K/UL (ref 4.8–10.8)

## 2019-10-30 PROCEDURE — 87077 CULTURE AEROBIC IDENTIFY: CPT

## 2019-10-30 PROCEDURE — 85025 COMPLETE CBC W/AUTO DIFF WBC: CPT

## 2019-10-30 PROCEDURE — 87186 SC STD MICRODIL/AGAR DIL: CPT

## 2019-10-30 PROCEDURE — 10060 I&D ABSCESS SIMPLE/SINGLE: CPT

## 2019-10-30 PROCEDURE — 80053 COMPREHEN METABOLIC PANEL: CPT

## 2019-10-30 PROCEDURE — 87070 CULTURE OTHR SPECIMN AEROBIC: CPT

## 2019-10-30 PROCEDURE — 36415 COLL VENOUS BLD VENIPUNCTURE: CPT

## 2019-10-30 PROCEDURE — 10160 PNXR ASPIR ABSC HMTMA BULLA: CPT

## 2019-10-30 PROCEDURE — 73630 X-RAY EXAM OF FOOT: CPT

## 2019-11-04 ENCOUNTER — HOSPITAL ENCOUNTER (OUTPATIENT)
Dept: HOSPITAL 90 - WHH | Age: 59
Discharge: HOME | End: 2019-11-04
Attending: PODIATRIST
Payer: COMMERCIAL

## 2019-11-04 VITALS — DIASTOLIC BLOOD PRESSURE: 81 MMHG | SYSTOLIC BLOOD PRESSURE: 125 MMHG

## 2019-11-04 DIAGNOSIS — L97.521: ICD-10-CM

## 2019-11-04 DIAGNOSIS — Z85.828: ICD-10-CM

## 2019-11-04 DIAGNOSIS — I25.10: ICD-10-CM

## 2019-11-04 DIAGNOSIS — E11.42: ICD-10-CM

## 2019-11-04 DIAGNOSIS — E11.22: ICD-10-CM

## 2019-11-04 DIAGNOSIS — Z88.6: ICD-10-CM

## 2019-11-04 DIAGNOSIS — L02.612: ICD-10-CM

## 2019-11-04 DIAGNOSIS — Z85.89: ICD-10-CM

## 2019-11-04 DIAGNOSIS — Z95.1: ICD-10-CM

## 2019-11-04 DIAGNOSIS — E11.21: ICD-10-CM

## 2019-11-04 DIAGNOSIS — Z79.4: ICD-10-CM

## 2019-11-04 DIAGNOSIS — E11.621: Primary | ICD-10-CM

## 2019-11-04 DIAGNOSIS — Z88.5: ICD-10-CM

## 2019-11-04 DIAGNOSIS — N18.2: ICD-10-CM

## 2019-11-04 DIAGNOSIS — Z79.82: ICD-10-CM

## 2019-11-04 DIAGNOSIS — E11.319: ICD-10-CM

## 2019-11-04 DIAGNOSIS — Z79.899: ICD-10-CM

## 2019-11-04 DIAGNOSIS — E78.2: ICD-10-CM

## 2019-11-04 DIAGNOSIS — Z86.73: ICD-10-CM

## 2019-11-04 DIAGNOSIS — E11.51: ICD-10-CM

## 2019-11-04 DIAGNOSIS — I12.9: ICD-10-CM

## 2019-11-04 DIAGNOSIS — M86.672: ICD-10-CM

## 2019-11-04 DIAGNOSIS — E11.69: ICD-10-CM

## 2019-11-04 DIAGNOSIS — I25.2: ICD-10-CM

## 2019-11-04 DIAGNOSIS — M19.072: ICD-10-CM

## 2019-11-04 DIAGNOSIS — K21.9: ICD-10-CM

## 2019-11-06 ENCOUNTER — HOSPITAL ENCOUNTER (OUTPATIENT)
Dept: HOSPITAL 90 - WHH | Age: 59
Discharge: HOME | End: 2019-11-06
Attending: PODIATRIST
Payer: COMMERCIAL

## 2019-11-06 VITALS — DIASTOLIC BLOOD PRESSURE: 82 MMHG | SYSTOLIC BLOOD PRESSURE: 134 MMHG

## 2019-11-06 DIAGNOSIS — E11.51: ICD-10-CM

## 2019-11-06 DIAGNOSIS — E11.42: ICD-10-CM

## 2019-11-06 DIAGNOSIS — E78.2: ICD-10-CM

## 2019-11-06 DIAGNOSIS — Z79.899: ICD-10-CM

## 2019-11-06 DIAGNOSIS — E11.69: ICD-10-CM

## 2019-11-06 DIAGNOSIS — E11.22: ICD-10-CM

## 2019-11-06 DIAGNOSIS — Z88.6: ICD-10-CM

## 2019-11-06 DIAGNOSIS — M19.072: ICD-10-CM

## 2019-11-06 DIAGNOSIS — I12.9: ICD-10-CM

## 2019-11-06 DIAGNOSIS — K21.9: ICD-10-CM

## 2019-11-06 DIAGNOSIS — Z79.82: ICD-10-CM

## 2019-11-06 DIAGNOSIS — E11.621: Primary | ICD-10-CM

## 2019-11-06 DIAGNOSIS — L02.612: ICD-10-CM

## 2019-11-06 DIAGNOSIS — N18.2: ICD-10-CM

## 2019-11-06 DIAGNOSIS — I25.2: ICD-10-CM

## 2019-11-06 DIAGNOSIS — E11.319: ICD-10-CM

## 2019-11-06 DIAGNOSIS — Z85.89: ICD-10-CM

## 2019-11-06 DIAGNOSIS — Z88.5: ICD-10-CM

## 2019-11-06 DIAGNOSIS — Z79.4: ICD-10-CM

## 2019-11-06 DIAGNOSIS — M86.672: ICD-10-CM

## 2019-11-06 DIAGNOSIS — Z85.828: ICD-10-CM

## 2019-11-06 DIAGNOSIS — Z95.1: ICD-10-CM

## 2019-11-06 DIAGNOSIS — I25.10: ICD-10-CM

## 2019-11-06 DIAGNOSIS — L97.521: ICD-10-CM

## 2019-11-06 DIAGNOSIS — E11.21: ICD-10-CM

## 2019-11-06 DIAGNOSIS — Z86.73: ICD-10-CM

## 2019-11-06 PROCEDURE — 11042 DBRDMT SUBQ TIS 1ST 20SQCM/<: CPT

## 2019-11-13 ENCOUNTER — HOSPITAL ENCOUNTER (OUTPATIENT)
Dept: HOSPITAL 90 - WHH | Age: 59
Discharge: HOME | End: 2019-11-13
Attending: PODIATRIST
Payer: COMMERCIAL

## 2019-11-13 VITALS — SYSTOLIC BLOOD PRESSURE: 116 MMHG | DIASTOLIC BLOOD PRESSURE: 73 MMHG

## 2019-11-13 DIAGNOSIS — Z79.4: ICD-10-CM

## 2019-11-13 DIAGNOSIS — E11.21: ICD-10-CM

## 2019-11-13 DIAGNOSIS — E11.51: ICD-10-CM

## 2019-11-13 DIAGNOSIS — E78.2: ICD-10-CM

## 2019-11-13 DIAGNOSIS — Z79.899: ICD-10-CM

## 2019-11-13 DIAGNOSIS — E11.69: ICD-10-CM

## 2019-11-13 DIAGNOSIS — N18.2: ICD-10-CM

## 2019-11-13 DIAGNOSIS — M86.672: ICD-10-CM

## 2019-11-13 DIAGNOSIS — Z88.5: ICD-10-CM

## 2019-11-13 DIAGNOSIS — Z95.1: ICD-10-CM

## 2019-11-13 DIAGNOSIS — E11.621: Primary | ICD-10-CM

## 2019-11-13 DIAGNOSIS — I12.9: ICD-10-CM

## 2019-11-13 DIAGNOSIS — I25.2: ICD-10-CM

## 2019-11-13 DIAGNOSIS — K21.9: ICD-10-CM

## 2019-11-13 DIAGNOSIS — L97.521: ICD-10-CM

## 2019-11-13 DIAGNOSIS — E11.319: ICD-10-CM

## 2019-11-13 DIAGNOSIS — Z86.73: ICD-10-CM

## 2019-11-13 DIAGNOSIS — Z85.89: ICD-10-CM

## 2019-11-13 DIAGNOSIS — Z88.6: ICD-10-CM

## 2019-11-13 DIAGNOSIS — Z85.828: ICD-10-CM

## 2019-11-13 DIAGNOSIS — M19.072: ICD-10-CM

## 2019-11-13 DIAGNOSIS — E11.42: ICD-10-CM

## 2019-11-13 DIAGNOSIS — Z79.82: ICD-10-CM

## 2019-11-13 DIAGNOSIS — E11.22: ICD-10-CM

## 2019-11-13 DIAGNOSIS — I25.10: ICD-10-CM

## 2019-11-13 PROCEDURE — 99214 OFFICE O/P EST MOD 30 MIN: CPT

## 2019-11-27 ENCOUNTER — HOSPITAL ENCOUNTER (OUTPATIENT)
Dept: HOSPITAL 90 - WHH | Age: 59
Discharge: HOME | End: 2019-11-27
Attending: PODIATRIST
Payer: COMMERCIAL

## 2019-11-27 VITALS — DIASTOLIC BLOOD PRESSURE: 82 MMHG | SYSTOLIC BLOOD PRESSURE: 142 MMHG

## 2019-11-27 DIAGNOSIS — E11.22: ICD-10-CM

## 2019-11-27 DIAGNOSIS — M86.672: ICD-10-CM

## 2019-11-27 DIAGNOSIS — Z95.1: ICD-10-CM

## 2019-11-27 DIAGNOSIS — I25.2: ICD-10-CM

## 2019-11-27 DIAGNOSIS — M19.072: ICD-10-CM

## 2019-11-27 DIAGNOSIS — Z79.899: ICD-10-CM

## 2019-11-27 DIAGNOSIS — N18.2: ICD-10-CM

## 2019-11-27 DIAGNOSIS — Z85.89: ICD-10-CM

## 2019-11-27 DIAGNOSIS — Z79.4: ICD-10-CM

## 2019-11-27 DIAGNOSIS — E11.42: ICD-10-CM

## 2019-11-27 DIAGNOSIS — E78.2: ICD-10-CM

## 2019-11-27 DIAGNOSIS — E11.21: ICD-10-CM

## 2019-11-27 DIAGNOSIS — I25.10: ICD-10-CM

## 2019-11-27 DIAGNOSIS — Z86.73: ICD-10-CM

## 2019-11-27 DIAGNOSIS — E11.51: ICD-10-CM

## 2019-11-27 DIAGNOSIS — E11.69: ICD-10-CM

## 2019-11-27 DIAGNOSIS — Z85.828: ICD-10-CM

## 2019-11-27 DIAGNOSIS — Z79.82: ICD-10-CM

## 2019-11-27 DIAGNOSIS — E11.319: ICD-10-CM

## 2019-11-27 DIAGNOSIS — K21.9: ICD-10-CM

## 2019-11-27 DIAGNOSIS — E11.621: Primary | ICD-10-CM

## 2019-11-27 DIAGNOSIS — Z88.6: ICD-10-CM

## 2019-11-27 DIAGNOSIS — I12.9: ICD-10-CM

## 2019-11-27 DIAGNOSIS — L97.522: ICD-10-CM

## 2019-11-27 DIAGNOSIS — Z88.5: ICD-10-CM

## 2019-11-27 PROCEDURE — 11042 DBRDMT SUBQ TIS 1ST 20SQCM/<: CPT

## 2019-12-02 ENCOUNTER — HOSPITAL ENCOUNTER (OUTPATIENT)
Dept: HOSPITAL 90 - WHH | Age: 59
Discharge: HOME | End: 2019-12-02
Attending: PODIATRIST
Payer: COMMERCIAL

## 2019-12-02 VITALS — SYSTOLIC BLOOD PRESSURE: 172 MMHG | DIASTOLIC BLOOD PRESSURE: 98 MMHG

## 2019-12-02 DIAGNOSIS — K21.9: ICD-10-CM

## 2019-12-02 DIAGNOSIS — I12.9: ICD-10-CM

## 2019-12-02 DIAGNOSIS — N18.2: ICD-10-CM

## 2019-12-02 DIAGNOSIS — Z88.6: ICD-10-CM

## 2019-12-02 DIAGNOSIS — Z79.4: ICD-10-CM

## 2019-12-02 DIAGNOSIS — Z79.82: ICD-10-CM

## 2019-12-02 DIAGNOSIS — Z86.73: ICD-10-CM

## 2019-12-02 DIAGNOSIS — E11.21: ICD-10-CM

## 2019-12-02 DIAGNOSIS — Z79.899: ICD-10-CM

## 2019-12-02 DIAGNOSIS — M19.072: ICD-10-CM

## 2019-12-02 DIAGNOSIS — I25.10: ICD-10-CM

## 2019-12-02 DIAGNOSIS — E11.22: ICD-10-CM

## 2019-12-02 DIAGNOSIS — E11.319: ICD-10-CM

## 2019-12-02 DIAGNOSIS — I25.2: ICD-10-CM

## 2019-12-02 DIAGNOSIS — E78.2: ICD-10-CM

## 2019-12-02 DIAGNOSIS — E11.42: ICD-10-CM

## 2019-12-02 DIAGNOSIS — E11.621: Primary | ICD-10-CM

## 2019-12-02 DIAGNOSIS — E11.69: ICD-10-CM

## 2019-12-02 DIAGNOSIS — L97.522: ICD-10-CM

## 2019-12-02 DIAGNOSIS — Z95.1: ICD-10-CM

## 2019-12-02 DIAGNOSIS — M86.672: ICD-10-CM

## 2019-12-02 DIAGNOSIS — Z85.89: ICD-10-CM

## 2019-12-02 DIAGNOSIS — Z88.5: ICD-10-CM

## 2019-12-02 DIAGNOSIS — E11.51: ICD-10-CM

## 2019-12-02 DIAGNOSIS — Z85.828: ICD-10-CM

## 2019-12-02 PROCEDURE — 93005 ELECTROCARDIOGRAM TRACING: CPT

## 2019-12-02 PROCEDURE — 93923 UPR/LXTR ART STDY 3+ LVLS: CPT

## 2019-12-02 PROCEDURE — 71045 X-RAY EXAM CHEST 1 VIEW: CPT

## 2019-12-02 PROCEDURE — 99211 OFF/OP EST MAY X REQ PHY/QHP: CPT

## 2019-12-06 ENCOUNTER — HOSPITAL ENCOUNTER (OUTPATIENT)
Dept: HOSPITAL 90 - WHH | Age: 59
Discharge: HOME | End: 2019-12-06
Attending: PODIATRIST
Payer: COMMERCIAL

## 2019-12-06 VITALS — DIASTOLIC BLOOD PRESSURE: 78 MMHG | SYSTOLIC BLOOD PRESSURE: 148 MMHG

## 2019-12-06 DIAGNOSIS — I25.10: ICD-10-CM

## 2019-12-06 DIAGNOSIS — E11.319: ICD-10-CM

## 2019-12-06 DIAGNOSIS — Z88.6: ICD-10-CM

## 2019-12-06 DIAGNOSIS — I12.9: ICD-10-CM

## 2019-12-06 DIAGNOSIS — E11.621: Primary | ICD-10-CM

## 2019-12-06 DIAGNOSIS — Z79.899: ICD-10-CM

## 2019-12-06 DIAGNOSIS — M19.072: ICD-10-CM

## 2019-12-06 DIAGNOSIS — Z85.828: ICD-10-CM

## 2019-12-06 DIAGNOSIS — E11.69: ICD-10-CM

## 2019-12-06 DIAGNOSIS — Z95.1: ICD-10-CM

## 2019-12-06 DIAGNOSIS — Z79.4: ICD-10-CM

## 2019-12-06 DIAGNOSIS — M86.672: ICD-10-CM

## 2019-12-06 DIAGNOSIS — L97.522: ICD-10-CM

## 2019-12-06 DIAGNOSIS — N18.2: ICD-10-CM

## 2019-12-06 DIAGNOSIS — E11.21: ICD-10-CM

## 2019-12-06 DIAGNOSIS — Z85.89: ICD-10-CM

## 2019-12-06 DIAGNOSIS — Z79.82: ICD-10-CM

## 2019-12-06 DIAGNOSIS — I25.2: ICD-10-CM

## 2019-12-06 DIAGNOSIS — Z88.5: ICD-10-CM

## 2019-12-06 DIAGNOSIS — Z86.73: ICD-10-CM

## 2019-12-06 DIAGNOSIS — E11.51: ICD-10-CM

## 2019-12-06 DIAGNOSIS — E78.2: ICD-10-CM

## 2019-12-06 DIAGNOSIS — E11.42: ICD-10-CM

## 2019-12-06 PROCEDURE — 93922 UPR/L XTREMITY ART 2 LEVELS: CPT

## 2019-12-06 PROCEDURE — 99211 OFF/OP EST MAY X REQ PHY/QHP: CPT

## 2019-12-11 ENCOUNTER — HOSPITAL ENCOUNTER (OUTPATIENT)
Dept: HOSPITAL 90 - WHH | Age: 59
Discharge: HOME | End: 2019-12-11
Attending: PODIATRIST
Payer: COMMERCIAL

## 2019-12-11 VITALS — DIASTOLIC BLOOD PRESSURE: 78 MMHG | SYSTOLIC BLOOD PRESSURE: 143 MMHG

## 2019-12-11 DIAGNOSIS — Z85.89: ICD-10-CM

## 2019-12-11 DIAGNOSIS — Z88.5: ICD-10-CM

## 2019-12-11 DIAGNOSIS — Z79.4: ICD-10-CM

## 2019-12-11 DIAGNOSIS — E78.2: ICD-10-CM

## 2019-12-11 DIAGNOSIS — Z79.82: ICD-10-CM

## 2019-12-11 DIAGNOSIS — E11.42: ICD-10-CM

## 2019-12-11 DIAGNOSIS — E11.51: ICD-10-CM

## 2019-12-11 DIAGNOSIS — I12.9: ICD-10-CM

## 2019-12-11 DIAGNOSIS — I25.2: ICD-10-CM

## 2019-12-11 DIAGNOSIS — N18.2: ICD-10-CM

## 2019-12-11 DIAGNOSIS — E11.69: ICD-10-CM

## 2019-12-11 DIAGNOSIS — Z79.899: ICD-10-CM

## 2019-12-11 DIAGNOSIS — Z85.828: ICD-10-CM

## 2019-12-11 DIAGNOSIS — Z86.73: ICD-10-CM

## 2019-12-11 DIAGNOSIS — E11.621: Primary | ICD-10-CM

## 2019-12-11 DIAGNOSIS — Z95.1: ICD-10-CM

## 2019-12-11 DIAGNOSIS — Z88.6: ICD-10-CM

## 2019-12-11 DIAGNOSIS — M19.072: ICD-10-CM

## 2019-12-11 DIAGNOSIS — M86.672: ICD-10-CM

## 2019-12-11 DIAGNOSIS — E11.21: ICD-10-CM

## 2019-12-11 DIAGNOSIS — I25.10: ICD-10-CM

## 2019-12-11 DIAGNOSIS — L97.526: ICD-10-CM

## 2019-12-11 DIAGNOSIS — E11.22: ICD-10-CM

## 2019-12-11 DIAGNOSIS — K21.9: ICD-10-CM

## 2019-12-11 DIAGNOSIS — E11.319: ICD-10-CM

## 2019-12-11 PROCEDURE — 11042 DBRDMT SUBQ TIS 1ST 20SQCM/<: CPT

## 2019-12-11 PROCEDURE — 87070 CULTURE OTHR SPECIMN AEROBIC: CPT

## 2019-12-18 ENCOUNTER — HOSPITAL ENCOUNTER (OUTPATIENT)
Dept: HOSPITAL 90 - WHH | Age: 59
Discharge: HOME | End: 2019-12-18
Attending: PODIATRIST
Payer: COMMERCIAL

## 2019-12-18 VITALS — DIASTOLIC BLOOD PRESSURE: 78 MMHG | SYSTOLIC BLOOD PRESSURE: 128 MMHG

## 2019-12-18 DIAGNOSIS — E11.51: ICD-10-CM

## 2019-12-18 DIAGNOSIS — I25.2: ICD-10-CM

## 2019-12-18 DIAGNOSIS — Z79.4: ICD-10-CM

## 2019-12-18 DIAGNOSIS — E11.319: ICD-10-CM

## 2019-12-18 DIAGNOSIS — Z79.899: ICD-10-CM

## 2019-12-18 DIAGNOSIS — I25.10: ICD-10-CM

## 2019-12-18 DIAGNOSIS — Z88.6: ICD-10-CM

## 2019-12-18 DIAGNOSIS — Z95.1: ICD-10-CM

## 2019-12-18 DIAGNOSIS — Z85.89: ICD-10-CM

## 2019-12-18 DIAGNOSIS — M19.072: ICD-10-CM

## 2019-12-18 DIAGNOSIS — K21.9: ICD-10-CM

## 2019-12-18 DIAGNOSIS — E11.621: Primary | ICD-10-CM

## 2019-12-18 DIAGNOSIS — E11.21: ICD-10-CM

## 2019-12-18 DIAGNOSIS — I12.9: ICD-10-CM

## 2019-12-18 DIAGNOSIS — Z79.82: ICD-10-CM

## 2019-12-18 DIAGNOSIS — E78.2: ICD-10-CM

## 2019-12-18 DIAGNOSIS — N18.2: ICD-10-CM

## 2019-12-18 DIAGNOSIS — E11.42: ICD-10-CM

## 2019-12-18 DIAGNOSIS — Z85.828: ICD-10-CM

## 2019-12-18 DIAGNOSIS — L97.522: ICD-10-CM

## 2019-12-18 DIAGNOSIS — E11.69: ICD-10-CM

## 2019-12-18 DIAGNOSIS — M86.672: ICD-10-CM

## 2019-12-18 DIAGNOSIS — E11.22: ICD-10-CM

## 2019-12-18 DIAGNOSIS — Z86.73: ICD-10-CM

## 2019-12-18 PROCEDURE — 11042 DBRDMT SUBQ TIS 1ST 20SQCM/<: CPT

## 2020-01-13 ENCOUNTER — HOSPITAL ENCOUNTER (OUTPATIENT)
Dept: HOSPITAL 90 - WHH | Age: 60
Discharge: HOME | End: 2020-01-13
Attending: PODIATRIST
Payer: COMMERCIAL

## 2020-01-13 DIAGNOSIS — Z79.82: ICD-10-CM

## 2020-01-13 DIAGNOSIS — L97.521: ICD-10-CM

## 2020-01-13 DIAGNOSIS — E11.319: ICD-10-CM

## 2020-01-13 DIAGNOSIS — E11.621: Primary | ICD-10-CM

## 2020-01-13 DIAGNOSIS — Z86.73: ICD-10-CM

## 2020-01-13 DIAGNOSIS — Z85.89: ICD-10-CM

## 2020-01-13 DIAGNOSIS — I25.2: ICD-10-CM

## 2020-01-13 DIAGNOSIS — M86.672: ICD-10-CM

## 2020-01-13 DIAGNOSIS — E11.51: ICD-10-CM

## 2020-01-13 DIAGNOSIS — Z88.5: ICD-10-CM

## 2020-01-13 DIAGNOSIS — Z95.1: ICD-10-CM

## 2020-01-13 DIAGNOSIS — E11.21: ICD-10-CM

## 2020-01-13 DIAGNOSIS — I25.10: ICD-10-CM

## 2020-01-13 DIAGNOSIS — Z88.6: ICD-10-CM

## 2020-01-13 DIAGNOSIS — I12.9: ICD-10-CM

## 2020-01-13 DIAGNOSIS — M19.072: ICD-10-CM

## 2020-01-13 DIAGNOSIS — Z85.828: ICD-10-CM

## 2020-01-13 DIAGNOSIS — E11.22: ICD-10-CM

## 2020-01-13 DIAGNOSIS — E78.2: ICD-10-CM

## 2020-01-13 DIAGNOSIS — K21.9: ICD-10-CM

## 2020-01-13 DIAGNOSIS — N18.2: ICD-10-CM

## 2020-01-13 DIAGNOSIS — Z79.4: ICD-10-CM

## 2020-01-13 DIAGNOSIS — E11.69: ICD-10-CM

## 2020-01-13 DIAGNOSIS — E11.42: ICD-10-CM

## 2020-01-13 DIAGNOSIS — Z79.899: ICD-10-CM

## 2020-01-13 PROCEDURE — 99211 OFF/OP EST MAY X REQ PHY/QHP: CPT

## 2020-01-15 ENCOUNTER — HOSPITAL ENCOUNTER (OUTPATIENT)
Dept: HOSPITAL 90 - WHH | Age: 60
Discharge: HOME | End: 2020-01-15
Attending: PODIATRIST
Payer: COMMERCIAL

## 2020-01-15 VITALS — SYSTOLIC BLOOD PRESSURE: 101 MMHG | DIASTOLIC BLOOD PRESSURE: 70 MMHG

## 2020-01-15 DIAGNOSIS — E11.319: ICD-10-CM

## 2020-01-15 DIAGNOSIS — Z88.5: ICD-10-CM

## 2020-01-15 DIAGNOSIS — M86.672: ICD-10-CM

## 2020-01-15 DIAGNOSIS — M19.072: ICD-10-CM

## 2020-01-15 DIAGNOSIS — E11.621: Primary | ICD-10-CM

## 2020-01-15 DIAGNOSIS — E11.42: ICD-10-CM

## 2020-01-15 DIAGNOSIS — E11.69: ICD-10-CM

## 2020-01-15 DIAGNOSIS — E11.22: ICD-10-CM

## 2020-01-15 DIAGNOSIS — N18.2: ICD-10-CM

## 2020-01-15 DIAGNOSIS — Z79.82: ICD-10-CM

## 2020-01-15 DIAGNOSIS — Z88.6: ICD-10-CM

## 2020-01-15 DIAGNOSIS — I25.10: ICD-10-CM

## 2020-01-15 DIAGNOSIS — I12.9: ICD-10-CM

## 2020-01-15 DIAGNOSIS — Z79.4: ICD-10-CM

## 2020-01-15 DIAGNOSIS — E11.21: ICD-10-CM

## 2020-01-15 DIAGNOSIS — Z79.899: ICD-10-CM

## 2020-01-15 DIAGNOSIS — Z86.73: ICD-10-CM

## 2020-01-15 DIAGNOSIS — Z95.1: ICD-10-CM

## 2020-01-15 DIAGNOSIS — E11.51: ICD-10-CM

## 2020-01-15 DIAGNOSIS — E78.2: ICD-10-CM

## 2020-01-15 DIAGNOSIS — Z85.828: ICD-10-CM

## 2020-01-15 DIAGNOSIS — Z85.89: ICD-10-CM

## 2020-01-15 DIAGNOSIS — I25.2: ICD-10-CM

## 2020-01-15 DIAGNOSIS — L97.522: ICD-10-CM

## 2020-01-15 DIAGNOSIS — K21.9: ICD-10-CM

## 2020-01-15 PROCEDURE — 11042 DBRDMT SUBQ TIS 1ST 20SQCM/<: CPT

## 2020-01-15 PROCEDURE — 82948 REAGENT STRIP/BLOOD GLUCOSE: CPT

## 2020-01-16 ENCOUNTER — HOSPITAL ENCOUNTER (OUTPATIENT)
Dept: HOSPITAL 90 - WHH | Age: 60
Discharge: HOME | End: 2020-01-16
Attending: FAMILY MEDICINE
Payer: COMMERCIAL

## 2020-01-16 VITALS — SYSTOLIC BLOOD PRESSURE: 124 MMHG | DIASTOLIC BLOOD PRESSURE: 72 MMHG

## 2020-01-16 VITALS — SYSTOLIC BLOOD PRESSURE: 132 MMHG | DIASTOLIC BLOOD PRESSURE: 68 MMHG

## 2020-01-16 DIAGNOSIS — E11.51: ICD-10-CM

## 2020-01-16 DIAGNOSIS — Z86.73: ICD-10-CM

## 2020-01-16 DIAGNOSIS — Z95.1: ICD-10-CM

## 2020-01-16 DIAGNOSIS — E11.319: ICD-10-CM

## 2020-01-16 DIAGNOSIS — M19.072: ICD-10-CM

## 2020-01-16 DIAGNOSIS — Z79.4: ICD-10-CM

## 2020-01-16 DIAGNOSIS — K21.9: ICD-10-CM

## 2020-01-16 DIAGNOSIS — L97.521: ICD-10-CM

## 2020-01-16 DIAGNOSIS — Z88.6: ICD-10-CM

## 2020-01-16 DIAGNOSIS — Z85.828: ICD-10-CM

## 2020-01-16 DIAGNOSIS — I25.10: ICD-10-CM

## 2020-01-16 DIAGNOSIS — Z88.5: ICD-10-CM

## 2020-01-16 DIAGNOSIS — E11.22: ICD-10-CM

## 2020-01-16 DIAGNOSIS — I12.9: ICD-10-CM

## 2020-01-16 DIAGNOSIS — Z79.82: ICD-10-CM

## 2020-01-16 DIAGNOSIS — E11.621: Primary | ICD-10-CM

## 2020-01-16 DIAGNOSIS — Z85.89: ICD-10-CM

## 2020-01-16 DIAGNOSIS — I25.2: ICD-10-CM

## 2020-01-16 DIAGNOSIS — M86.672: ICD-10-CM

## 2020-01-16 DIAGNOSIS — E78.2: ICD-10-CM

## 2020-01-16 DIAGNOSIS — E11.21: ICD-10-CM

## 2020-01-16 DIAGNOSIS — N18.2: ICD-10-CM

## 2020-01-16 DIAGNOSIS — Z79.899: ICD-10-CM

## 2020-01-16 DIAGNOSIS — E11.42: ICD-10-CM

## 2020-01-16 DIAGNOSIS — E11.69: ICD-10-CM

## 2020-01-17 ENCOUNTER — HOSPITAL ENCOUNTER (OUTPATIENT)
Dept: HOSPITAL 90 - WHH | Age: 60
End: 2020-01-17
Attending: FAMILY MEDICINE
Payer: COMMERCIAL

## 2020-01-17 VITALS — DIASTOLIC BLOOD PRESSURE: 74 MMHG | SYSTOLIC BLOOD PRESSURE: 117 MMHG

## 2020-01-17 VITALS — SYSTOLIC BLOOD PRESSURE: 99 MMHG | DIASTOLIC BLOOD PRESSURE: 67 MMHG

## 2020-01-17 DIAGNOSIS — Z85.828: ICD-10-CM

## 2020-01-17 DIAGNOSIS — E11.621: Primary | ICD-10-CM

## 2020-01-17 DIAGNOSIS — E11.319: ICD-10-CM

## 2020-01-17 DIAGNOSIS — Z86.73: ICD-10-CM

## 2020-01-17 DIAGNOSIS — Z95.1: ICD-10-CM

## 2020-01-17 DIAGNOSIS — E11.69: ICD-10-CM

## 2020-01-17 DIAGNOSIS — Z88.5: ICD-10-CM

## 2020-01-17 DIAGNOSIS — E11.22: ICD-10-CM

## 2020-01-17 DIAGNOSIS — N18.2: ICD-10-CM

## 2020-01-17 DIAGNOSIS — M19.072: ICD-10-CM

## 2020-01-17 DIAGNOSIS — I12.9: ICD-10-CM

## 2020-01-17 DIAGNOSIS — I25.10: ICD-10-CM

## 2020-01-17 DIAGNOSIS — E11.21: ICD-10-CM

## 2020-01-17 DIAGNOSIS — Z85.89: ICD-10-CM

## 2020-01-17 DIAGNOSIS — Z79.82: ICD-10-CM

## 2020-01-17 DIAGNOSIS — M86.672: ICD-10-CM

## 2020-01-17 DIAGNOSIS — Z88.6: ICD-10-CM

## 2020-01-17 DIAGNOSIS — L97.521: ICD-10-CM

## 2020-01-17 DIAGNOSIS — K21.9: ICD-10-CM

## 2020-01-17 DIAGNOSIS — Z79.899: ICD-10-CM

## 2020-01-17 DIAGNOSIS — E78.2: ICD-10-CM

## 2020-01-17 DIAGNOSIS — E11.51: ICD-10-CM

## 2020-01-17 DIAGNOSIS — I25.2: ICD-10-CM

## 2020-01-17 DIAGNOSIS — E11.42: ICD-10-CM

## 2020-01-17 DIAGNOSIS — Z79.4: ICD-10-CM

## 2020-01-17 PROCEDURE — 82948 REAGENT STRIP/BLOOD GLUCOSE: CPT

## 2020-01-20 ENCOUNTER — HOSPITAL ENCOUNTER (OUTPATIENT)
Dept: HOSPITAL 90 - WHH | Age: 60
Discharge: HOME | End: 2020-01-20
Attending: FAMILY MEDICINE
Payer: COMMERCIAL

## 2020-01-20 VITALS — SYSTOLIC BLOOD PRESSURE: 144 MMHG | DIASTOLIC BLOOD PRESSURE: 77 MMHG

## 2020-01-20 VITALS — SYSTOLIC BLOOD PRESSURE: 142 MMHG | DIASTOLIC BLOOD PRESSURE: 72 MMHG

## 2020-01-20 DIAGNOSIS — E11.69: ICD-10-CM

## 2020-01-20 DIAGNOSIS — Z88.5: ICD-10-CM

## 2020-01-20 DIAGNOSIS — Z85.89: ICD-10-CM

## 2020-01-20 DIAGNOSIS — K21.9: ICD-10-CM

## 2020-01-20 DIAGNOSIS — Z88.6: ICD-10-CM

## 2020-01-20 DIAGNOSIS — Z95.1: ICD-10-CM

## 2020-01-20 DIAGNOSIS — N18.2: ICD-10-CM

## 2020-01-20 DIAGNOSIS — E11.22: ICD-10-CM

## 2020-01-20 DIAGNOSIS — Z85.828: ICD-10-CM

## 2020-01-20 DIAGNOSIS — E11.621: Primary | ICD-10-CM

## 2020-01-20 DIAGNOSIS — E11.51: ICD-10-CM

## 2020-01-20 DIAGNOSIS — E11.21: ICD-10-CM

## 2020-01-20 DIAGNOSIS — L97.521: ICD-10-CM

## 2020-01-20 DIAGNOSIS — M19.072: ICD-10-CM

## 2020-01-20 DIAGNOSIS — Z79.899: ICD-10-CM

## 2020-01-20 DIAGNOSIS — Z86.73: ICD-10-CM

## 2020-01-20 DIAGNOSIS — E11.319: ICD-10-CM

## 2020-01-20 DIAGNOSIS — E78.2: ICD-10-CM

## 2020-01-20 DIAGNOSIS — Z79.4: ICD-10-CM

## 2020-01-20 DIAGNOSIS — E11.42: ICD-10-CM

## 2020-01-20 DIAGNOSIS — M86.672: ICD-10-CM

## 2020-01-20 DIAGNOSIS — I25.2: ICD-10-CM

## 2020-01-20 DIAGNOSIS — Z79.82: ICD-10-CM

## 2020-01-20 DIAGNOSIS — I25.10: ICD-10-CM

## 2020-01-20 DIAGNOSIS — I12.9: ICD-10-CM

## 2020-01-20 PROCEDURE — 82948 REAGENT STRIP/BLOOD GLUCOSE: CPT

## 2020-01-21 ENCOUNTER — HOSPITAL ENCOUNTER (OUTPATIENT)
Dept: HOSPITAL 90 - WHH | Age: 60
Discharge: HOME | End: 2020-01-21
Attending: FAMILY MEDICINE
Payer: COMMERCIAL

## 2020-01-21 VITALS — DIASTOLIC BLOOD PRESSURE: 82 MMHG | SYSTOLIC BLOOD PRESSURE: 125 MMHG

## 2020-01-21 VITALS — SYSTOLIC BLOOD PRESSURE: 102 MMHG | DIASTOLIC BLOOD PRESSURE: 67 MMHG

## 2020-01-21 DIAGNOSIS — E11.621: Primary | ICD-10-CM

## 2020-01-21 DIAGNOSIS — Z95.1: ICD-10-CM

## 2020-01-21 DIAGNOSIS — Z85.89: ICD-10-CM

## 2020-01-21 DIAGNOSIS — K21.9: ICD-10-CM

## 2020-01-21 DIAGNOSIS — Z79.4: ICD-10-CM

## 2020-01-21 DIAGNOSIS — Z79.899: ICD-10-CM

## 2020-01-21 DIAGNOSIS — E11.21: ICD-10-CM

## 2020-01-21 DIAGNOSIS — I25.2: ICD-10-CM

## 2020-01-21 DIAGNOSIS — E78.2: ICD-10-CM

## 2020-01-21 DIAGNOSIS — E11.22: ICD-10-CM

## 2020-01-21 DIAGNOSIS — Z86.73: ICD-10-CM

## 2020-01-21 DIAGNOSIS — I12.9: ICD-10-CM

## 2020-01-21 DIAGNOSIS — E11.51: ICD-10-CM

## 2020-01-21 DIAGNOSIS — E11.42: ICD-10-CM

## 2020-01-21 DIAGNOSIS — E11.69: ICD-10-CM

## 2020-01-21 DIAGNOSIS — N18.2: ICD-10-CM

## 2020-01-21 DIAGNOSIS — Z88.6: ICD-10-CM

## 2020-01-21 DIAGNOSIS — L97.521: ICD-10-CM

## 2020-01-21 DIAGNOSIS — Z79.82: ICD-10-CM

## 2020-01-21 DIAGNOSIS — I25.10: ICD-10-CM

## 2020-01-21 DIAGNOSIS — M86.672: ICD-10-CM

## 2020-01-21 DIAGNOSIS — M19.072: ICD-10-CM

## 2020-01-21 DIAGNOSIS — E11.319: ICD-10-CM

## 2020-01-21 DIAGNOSIS — Z88.5: ICD-10-CM

## 2020-01-21 DIAGNOSIS — Z85.828: ICD-10-CM

## 2020-01-21 PROCEDURE — 82948 REAGENT STRIP/BLOOD GLUCOSE: CPT

## 2020-01-22 ENCOUNTER — HOSPITAL ENCOUNTER (OUTPATIENT)
Dept: HOSPITAL 90 - WHH | Age: 60
Discharge: HOME | End: 2020-01-22
Attending: PODIATRIST
Payer: COMMERCIAL

## 2020-01-22 VITALS — DIASTOLIC BLOOD PRESSURE: 78 MMHG | SYSTOLIC BLOOD PRESSURE: 119 MMHG

## 2020-01-22 DIAGNOSIS — M19.072: ICD-10-CM

## 2020-01-22 DIAGNOSIS — Z85.89: ICD-10-CM

## 2020-01-22 DIAGNOSIS — E11.621: Primary | ICD-10-CM

## 2020-01-22 DIAGNOSIS — Z88.6: ICD-10-CM

## 2020-01-22 DIAGNOSIS — Z88.5: ICD-10-CM

## 2020-01-22 DIAGNOSIS — I12.9: ICD-10-CM

## 2020-01-22 DIAGNOSIS — N18.2: ICD-10-CM

## 2020-01-22 DIAGNOSIS — E78.2: ICD-10-CM

## 2020-01-22 DIAGNOSIS — Z95.1: ICD-10-CM

## 2020-01-22 DIAGNOSIS — Z79.4: ICD-10-CM

## 2020-01-22 DIAGNOSIS — I25.10: ICD-10-CM

## 2020-01-22 DIAGNOSIS — E11.51: ICD-10-CM

## 2020-01-22 DIAGNOSIS — Z86.73: ICD-10-CM

## 2020-01-22 DIAGNOSIS — Z79.899: ICD-10-CM

## 2020-01-22 DIAGNOSIS — E11.22: ICD-10-CM

## 2020-01-22 DIAGNOSIS — Z85.828: ICD-10-CM

## 2020-01-22 DIAGNOSIS — I25.2: ICD-10-CM

## 2020-01-22 DIAGNOSIS — K21.9: ICD-10-CM

## 2020-01-22 DIAGNOSIS — E11.42: ICD-10-CM

## 2020-01-22 DIAGNOSIS — E11.69: ICD-10-CM

## 2020-01-22 DIAGNOSIS — E11.319: ICD-10-CM

## 2020-01-22 DIAGNOSIS — L97.521: ICD-10-CM

## 2020-01-22 DIAGNOSIS — Z79.82: ICD-10-CM

## 2020-01-22 DIAGNOSIS — E11.21: ICD-10-CM

## 2020-01-22 DIAGNOSIS — M86.672: ICD-10-CM

## 2020-01-23 ENCOUNTER — HOSPITAL ENCOUNTER (OUTPATIENT)
Dept: HOSPITAL 90 - WHH | Age: 60
Discharge: HOME | End: 2020-01-23
Attending: FAMILY MEDICINE
Payer: COMMERCIAL

## 2020-01-23 VITALS — DIASTOLIC BLOOD PRESSURE: 81 MMHG | SYSTOLIC BLOOD PRESSURE: 141 MMHG

## 2020-01-23 VITALS — SYSTOLIC BLOOD PRESSURE: 140 MMHG | DIASTOLIC BLOOD PRESSURE: 78 MMHG

## 2020-01-23 DIAGNOSIS — Z79.82: ICD-10-CM

## 2020-01-23 DIAGNOSIS — Z86.73: ICD-10-CM

## 2020-01-23 DIAGNOSIS — N18.2: ICD-10-CM

## 2020-01-23 DIAGNOSIS — Z79.899: ICD-10-CM

## 2020-01-23 DIAGNOSIS — E11.319: ICD-10-CM

## 2020-01-23 DIAGNOSIS — E11.21: ICD-10-CM

## 2020-01-23 DIAGNOSIS — Z79.4: ICD-10-CM

## 2020-01-23 DIAGNOSIS — I25.2: ICD-10-CM

## 2020-01-23 DIAGNOSIS — Z85.828: ICD-10-CM

## 2020-01-23 DIAGNOSIS — I25.10: ICD-10-CM

## 2020-01-23 DIAGNOSIS — E78.2: ICD-10-CM

## 2020-01-23 DIAGNOSIS — M86.672: ICD-10-CM

## 2020-01-23 DIAGNOSIS — E11.69: ICD-10-CM

## 2020-01-23 DIAGNOSIS — Z95.1: ICD-10-CM

## 2020-01-23 DIAGNOSIS — E11.42: ICD-10-CM

## 2020-01-23 DIAGNOSIS — M19.072: ICD-10-CM

## 2020-01-23 DIAGNOSIS — E11.621: Primary | ICD-10-CM

## 2020-01-23 DIAGNOSIS — K21.9: ICD-10-CM

## 2020-01-23 DIAGNOSIS — Z88.6: ICD-10-CM

## 2020-01-23 DIAGNOSIS — Z88.5: ICD-10-CM

## 2020-01-23 DIAGNOSIS — E11.22: ICD-10-CM

## 2020-01-23 DIAGNOSIS — I12.9: ICD-10-CM

## 2020-01-23 DIAGNOSIS — E11.51: ICD-10-CM

## 2020-01-23 DIAGNOSIS — L97.521: ICD-10-CM

## 2020-01-23 DIAGNOSIS — Z85.89: ICD-10-CM

## 2020-01-24 ENCOUNTER — HOSPITAL ENCOUNTER (OUTPATIENT)
Dept: HOSPITAL 90 - WHH | Age: 60
Discharge: HOME | End: 2020-01-24
Attending: FAMILY MEDICINE
Payer: COMMERCIAL

## 2020-01-24 VITALS — DIASTOLIC BLOOD PRESSURE: 78 MMHG | SYSTOLIC BLOOD PRESSURE: 104 MMHG

## 2020-01-24 VITALS — DIASTOLIC BLOOD PRESSURE: 77 MMHG | SYSTOLIC BLOOD PRESSURE: 144 MMHG

## 2020-01-24 DIAGNOSIS — E11.69: ICD-10-CM

## 2020-01-24 DIAGNOSIS — Z79.899: ICD-10-CM

## 2020-01-24 DIAGNOSIS — Z86.73: ICD-10-CM

## 2020-01-24 DIAGNOSIS — E11.319: ICD-10-CM

## 2020-01-24 DIAGNOSIS — E78.2: ICD-10-CM

## 2020-01-24 DIAGNOSIS — E11.621: Primary | ICD-10-CM

## 2020-01-24 DIAGNOSIS — Z79.82: ICD-10-CM

## 2020-01-24 DIAGNOSIS — Z85.89: ICD-10-CM

## 2020-01-24 DIAGNOSIS — E11.22: ICD-10-CM

## 2020-01-24 DIAGNOSIS — E11.42: ICD-10-CM

## 2020-01-24 DIAGNOSIS — I25.2: ICD-10-CM

## 2020-01-24 DIAGNOSIS — M86.672: ICD-10-CM

## 2020-01-24 DIAGNOSIS — Z95.1: ICD-10-CM

## 2020-01-24 DIAGNOSIS — E11.21: ICD-10-CM

## 2020-01-24 DIAGNOSIS — Z88.6: ICD-10-CM

## 2020-01-24 DIAGNOSIS — N18.2: ICD-10-CM

## 2020-01-24 DIAGNOSIS — Z85.828: ICD-10-CM

## 2020-01-24 DIAGNOSIS — L97.521: ICD-10-CM

## 2020-01-24 DIAGNOSIS — M19.072: ICD-10-CM

## 2020-01-24 DIAGNOSIS — I25.10: ICD-10-CM

## 2020-01-24 DIAGNOSIS — I12.9: ICD-10-CM

## 2020-01-24 DIAGNOSIS — Z79.4: ICD-10-CM

## 2020-01-24 DIAGNOSIS — Z88.5: ICD-10-CM

## 2020-01-24 DIAGNOSIS — K21.9: ICD-10-CM

## 2020-01-24 DIAGNOSIS — E11.51: ICD-10-CM

## 2020-01-27 ENCOUNTER — HOSPITAL ENCOUNTER (OUTPATIENT)
Dept: HOSPITAL 90 - WHH | Age: 60
Discharge: HOME | End: 2020-01-27
Attending: FAMILY MEDICINE
Payer: COMMERCIAL

## 2020-01-27 VITALS — SYSTOLIC BLOOD PRESSURE: 124 MMHG | DIASTOLIC BLOOD PRESSURE: 82 MMHG

## 2020-01-27 DIAGNOSIS — N18.2: ICD-10-CM

## 2020-01-27 DIAGNOSIS — Z86.73: ICD-10-CM

## 2020-01-27 DIAGNOSIS — Z88.5: ICD-10-CM

## 2020-01-27 DIAGNOSIS — I12.9: ICD-10-CM

## 2020-01-27 DIAGNOSIS — E11.42: ICD-10-CM

## 2020-01-27 DIAGNOSIS — E11.21: ICD-10-CM

## 2020-01-27 DIAGNOSIS — Z88.6: ICD-10-CM

## 2020-01-27 DIAGNOSIS — I25.10: ICD-10-CM

## 2020-01-27 DIAGNOSIS — M19.072: ICD-10-CM

## 2020-01-27 DIAGNOSIS — Z85.89: ICD-10-CM

## 2020-01-27 DIAGNOSIS — Z85.828: ICD-10-CM

## 2020-01-27 DIAGNOSIS — E11.22: ICD-10-CM

## 2020-01-27 DIAGNOSIS — E11.621: Primary | ICD-10-CM

## 2020-01-27 DIAGNOSIS — Z95.1: ICD-10-CM

## 2020-01-27 DIAGNOSIS — E78.2: ICD-10-CM

## 2020-01-27 DIAGNOSIS — L97.521: ICD-10-CM

## 2020-01-27 DIAGNOSIS — E11.69: ICD-10-CM

## 2020-01-27 DIAGNOSIS — Z79.82: ICD-10-CM

## 2020-01-27 DIAGNOSIS — E11.51: ICD-10-CM

## 2020-01-27 DIAGNOSIS — M86.672: ICD-10-CM

## 2020-01-27 DIAGNOSIS — Z79.4: ICD-10-CM

## 2020-01-27 DIAGNOSIS — I25.2: ICD-10-CM

## 2020-01-27 DIAGNOSIS — E11.319: ICD-10-CM

## 2020-01-27 DIAGNOSIS — Z79.899: ICD-10-CM

## 2020-01-27 DIAGNOSIS — K21.9: ICD-10-CM

## 2020-01-27 PROCEDURE — 82948 REAGENT STRIP/BLOOD GLUCOSE: CPT

## 2020-01-28 ENCOUNTER — HOSPITAL ENCOUNTER (OUTPATIENT)
Dept: HOSPITAL 90 - WHH | Age: 60
Discharge: HOME | End: 2020-01-28
Attending: FAMILY MEDICINE
Payer: COMMERCIAL

## 2020-01-28 VITALS — DIASTOLIC BLOOD PRESSURE: 80 MMHG | SYSTOLIC BLOOD PRESSURE: 135 MMHG

## 2020-01-28 DIAGNOSIS — Z79.82: ICD-10-CM

## 2020-01-28 DIAGNOSIS — M19.072: ICD-10-CM

## 2020-01-28 DIAGNOSIS — N18.2: ICD-10-CM

## 2020-01-28 DIAGNOSIS — L97.521: ICD-10-CM

## 2020-01-28 DIAGNOSIS — Z85.828: ICD-10-CM

## 2020-01-28 DIAGNOSIS — E11.621: Primary | ICD-10-CM

## 2020-01-28 DIAGNOSIS — Z79.4: ICD-10-CM

## 2020-01-28 DIAGNOSIS — Z85.89: ICD-10-CM

## 2020-01-28 DIAGNOSIS — E11.319: ICD-10-CM

## 2020-01-28 DIAGNOSIS — Z86.73: ICD-10-CM

## 2020-01-28 DIAGNOSIS — I12.9: ICD-10-CM

## 2020-01-28 DIAGNOSIS — Z88.5: ICD-10-CM

## 2020-01-28 DIAGNOSIS — Z95.1: ICD-10-CM

## 2020-01-28 DIAGNOSIS — E11.21: ICD-10-CM

## 2020-01-28 DIAGNOSIS — Z88.6: ICD-10-CM

## 2020-01-28 DIAGNOSIS — Z79.899: ICD-10-CM

## 2020-01-28 DIAGNOSIS — E11.51: ICD-10-CM

## 2020-01-28 DIAGNOSIS — E11.69: ICD-10-CM

## 2020-01-28 DIAGNOSIS — I25.10: ICD-10-CM

## 2020-01-28 DIAGNOSIS — E11.42: ICD-10-CM

## 2020-01-28 DIAGNOSIS — M86.672: ICD-10-CM

## 2020-01-28 DIAGNOSIS — E11.22: ICD-10-CM

## 2020-01-28 DIAGNOSIS — I25.2: ICD-10-CM

## 2020-01-28 DIAGNOSIS — K21.9: ICD-10-CM

## 2020-01-28 DIAGNOSIS — E78.2: ICD-10-CM

## 2020-01-28 PROCEDURE — 82948 REAGENT STRIP/BLOOD GLUCOSE: CPT

## 2020-01-29 ENCOUNTER — HOSPITAL ENCOUNTER (OUTPATIENT)
Dept: HOSPITAL 90 - WHH | Age: 60
Discharge: HOME | End: 2020-01-29
Attending: PODIATRIST
Payer: COMMERCIAL

## 2020-01-29 VITALS — DIASTOLIC BLOOD PRESSURE: 78 MMHG | SYSTOLIC BLOOD PRESSURE: 138 MMHG

## 2020-01-29 VITALS — SYSTOLIC BLOOD PRESSURE: 148 MMHG | DIASTOLIC BLOOD PRESSURE: 81 MMHG

## 2020-01-29 DIAGNOSIS — Z79.4: ICD-10-CM

## 2020-01-29 DIAGNOSIS — Z85.828: ICD-10-CM

## 2020-01-29 DIAGNOSIS — Z88.6: ICD-10-CM

## 2020-01-29 DIAGNOSIS — I25.2: ICD-10-CM

## 2020-01-29 DIAGNOSIS — Z85.89: ICD-10-CM

## 2020-01-29 DIAGNOSIS — Z79.899: ICD-10-CM

## 2020-01-29 DIAGNOSIS — E11.22: ICD-10-CM

## 2020-01-29 DIAGNOSIS — I12.9: ICD-10-CM

## 2020-01-29 DIAGNOSIS — N18.2: ICD-10-CM

## 2020-01-29 DIAGNOSIS — I25.10: ICD-10-CM

## 2020-01-29 DIAGNOSIS — K21.9: ICD-10-CM

## 2020-01-29 DIAGNOSIS — M19.072: ICD-10-CM

## 2020-01-29 DIAGNOSIS — L97.521: ICD-10-CM

## 2020-01-29 DIAGNOSIS — E11.51: ICD-10-CM

## 2020-01-29 DIAGNOSIS — E11.21: ICD-10-CM

## 2020-01-29 DIAGNOSIS — Z79.82: ICD-10-CM

## 2020-01-29 DIAGNOSIS — M86.672: ICD-10-CM

## 2020-01-29 DIAGNOSIS — Z88.5: ICD-10-CM

## 2020-01-29 DIAGNOSIS — E78.2: ICD-10-CM

## 2020-01-29 DIAGNOSIS — Z95.1: ICD-10-CM

## 2020-01-29 DIAGNOSIS — E11.319: ICD-10-CM

## 2020-01-29 DIAGNOSIS — E11.42: ICD-10-CM

## 2020-01-29 DIAGNOSIS — E11.69: ICD-10-CM

## 2020-01-29 DIAGNOSIS — Z86.73: ICD-10-CM

## 2020-01-29 DIAGNOSIS — E11.621: Primary | ICD-10-CM

## 2020-01-30 ENCOUNTER — HOSPITAL ENCOUNTER (OUTPATIENT)
Dept: HOSPITAL 90 - WHH | Age: 60
Discharge: HOME | End: 2020-01-30
Attending: FAMILY MEDICINE
Payer: COMMERCIAL

## 2020-01-30 VITALS — SYSTOLIC BLOOD PRESSURE: 118 MMHG | DIASTOLIC BLOOD PRESSURE: 70 MMHG

## 2020-01-30 VITALS — DIASTOLIC BLOOD PRESSURE: 85 MMHG | SYSTOLIC BLOOD PRESSURE: 139 MMHG

## 2020-01-30 DIAGNOSIS — E11.319: ICD-10-CM

## 2020-01-30 DIAGNOSIS — Z86.73: ICD-10-CM

## 2020-01-30 DIAGNOSIS — I12.9: ICD-10-CM

## 2020-01-30 DIAGNOSIS — Z95.1: ICD-10-CM

## 2020-01-30 DIAGNOSIS — Z79.899: ICD-10-CM

## 2020-01-30 DIAGNOSIS — Z88.5: ICD-10-CM

## 2020-01-30 DIAGNOSIS — E11.69: ICD-10-CM

## 2020-01-30 DIAGNOSIS — E11.21: ICD-10-CM

## 2020-01-30 DIAGNOSIS — Z85.89: ICD-10-CM

## 2020-01-30 DIAGNOSIS — E11.51: ICD-10-CM

## 2020-01-30 DIAGNOSIS — N18.2: ICD-10-CM

## 2020-01-30 DIAGNOSIS — E11.621: Primary | ICD-10-CM

## 2020-01-30 DIAGNOSIS — K21.9: ICD-10-CM

## 2020-01-30 DIAGNOSIS — I25.2: ICD-10-CM

## 2020-01-30 DIAGNOSIS — E78.2: ICD-10-CM

## 2020-01-30 DIAGNOSIS — Z79.82: ICD-10-CM

## 2020-01-30 DIAGNOSIS — M86.672: ICD-10-CM

## 2020-01-30 DIAGNOSIS — Z88.6: ICD-10-CM

## 2020-01-30 DIAGNOSIS — Z79.4: ICD-10-CM

## 2020-01-30 DIAGNOSIS — Z85.828: ICD-10-CM

## 2020-01-30 DIAGNOSIS — E11.42: ICD-10-CM

## 2020-01-30 DIAGNOSIS — M19.072: ICD-10-CM

## 2020-01-30 DIAGNOSIS — E11.22: ICD-10-CM

## 2020-01-30 DIAGNOSIS — L97.521: ICD-10-CM

## 2020-01-30 DIAGNOSIS — I25.10: ICD-10-CM

## 2020-01-30 NOTE — NUR
Late Entry 

-------------------------------------------------------------------------------

Addendum: 01/30/20 at 1625 by BEE JOHNSON RN [facility]

-------------------------------------------------------------------------------

Amended: Links added.

## 2020-01-31 ENCOUNTER — HOSPITAL ENCOUNTER (OUTPATIENT)
Dept: HOSPITAL 90 - WHH | Age: 60
Discharge: HOME | End: 2020-01-31
Attending: FAMILY MEDICINE
Payer: COMMERCIAL

## 2020-01-31 VITALS — SYSTOLIC BLOOD PRESSURE: 136 MMHG | DIASTOLIC BLOOD PRESSURE: 70 MMHG

## 2020-01-31 VITALS — SYSTOLIC BLOOD PRESSURE: 130 MMHG | DIASTOLIC BLOOD PRESSURE: 68 MMHG

## 2020-01-31 DIAGNOSIS — Z95.1: ICD-10-CM

## 2020-01-31 DIAGNOSIS — Z79.899: ICD-10-CM

## 2020-01-31 DIAGNOSIS — E11.51: ICD-10-CM

## 2020-01-31 DIAGNOSIS — Z85.828: ICD-10-CM

## 2020-01-31 DIAGNOSIS — K21.9: ICD-10-CM

## 2020-01-31 DIAGNOSIS — E11.42: ICD-10-CM

## 2020-01-31 DIAGNOSIS — E11.621: Primary | ICD-10-CM

## 2020-01-31 DIAGNOSIS — M19.072: ICD-10-CM

## 2020-01-31 DIAGNOSIS — E11.319: ICD-10-CM

## 2020-01-31 DIAGNOSIS — Z85.89: ICD-10-CM

## 2020-01-31 DIAGNOSIS — Z88.5: ICD-10-CM

## 2020-01-31 DIAGNOSIS — Z79.4: ICD-10-CM

## 2020-01-31 DIAGNOSIS — I12.9: ICD-10-CM

## 2020-01-31 DIAGNOSIS — Z88.6: ICD-10-CM

## 2020-01-31 DIAGNOSIS — M86.672: ICD-10-CM

## 2020-01-31 DIAGNOSIS — I25.10: ICD-10-CM

## 2020-01-31 DIAGNOSIS — E11.21: ICD-10-CM

## 2020-01-31 DIAGNOSIS — E78.2: ICD-10-CM

## 2020-01-31 DIAGNOSIS — L97.521: ICD-10-CM

## 2020-01-31 DIAGNOSIS — Z79.82: ICD-10-CM

## 2020-01-31 DIAGNOSIS — E11.69: ICD-10-CM

## 2020-01-31 DIAGNOSIS — I25.2: ICD-10-CM

## 2020-01-31 DIAGNOSIS — Z86.73: ICD-10-CM

## 2020-01-31 DIAGNOSIS — E11.22: ICD-10-CM

## 2020-01-31 DIAGNOSIS — N18.2: ICD-10-CM

## 2020-02-03 ENCOUNTER — HOSPITAL ENCOUNTER (OUTPATIENT)
Dept: HOSPITAL 90 - WHH | Age: 60
Discharge: HOME | End: 2020-02-03
Attending: FAMILY MEDICINE
Payer: COMMERCIAL

## 2020-02-03 VITALS — DIASTOLIC BLOOD PRESSURE: 80 MMHG | SYSTOLIC BLOOD PRESSURE: 136 MMHG

## 2020-02-03 DIAGNOSIS — Z86.73: ICD-10-CM

## 2020-02-03 DIAGNOSIS — E78.2: ICD-10-CM

## 2020-02-03 DIAGNOSIS — M86.672: ICD-10-CM

## 2020-02-03 DIAGNOSIS — E11.21: ICD-10-CM

## 2020-02-03 DIAGNOSIS — I25.2: ICD-10-CM

## 2020-02-03 DIAGNOSIS — M19.072: ICD-10-CM

## 2020-02-03 DIAGNOSIS — Z85.828: ICD-10-CM

## 2020-02-03 DIAGNOSIS — E11.621: Primary | ICD-10-CM

## 2020-02-03 DIAGNOSIS — E11.319: ICD-10-CM

## 2020-02-03 DIAGNOSIS — Z88.5: ICD-10-CM

## 2020-02-03 DIAGNOSIS — I12.9: ICD-10-CM

## 2020-02-03 DIAGNOSIS — E11.22: ICD-10-CM

## 2020-02-03 DIAGNOSIS — I25.10: ICD-10-CM

## 2020-02-03 DIAGNOSIS — K21.9: ICD-10-CM

## 2020-02-03 DIAGNOSIS — L97.521: ICD-10-CM

## 2020-02-03 DIAGNOSIS — Z88.6: ICD-10-CM

## 2020-02-03 DIAGNOSIS — Z79.899: ICD-10-CM

## 2020-02-03 DIAGNOSIS — E11.69: ICD-10-CM

## 2020-02-03 DIAGNOSIS — E11.51: ICD-10-CM

## 2020-02-03 DIAGNOSIS — Z79.82: ICD-10-CM

## 2020-02-03 DIAGNOSIS — Z85.89: ICD-10-CM

## 2020-02-03 DIAGNOSIS — Z95.1: ICD-10-CM

## 2020-02-03 DIAGNOSIS — E11.42: ICD-10-CM

## 2020-02-03 DIAGNOSIS — Z79.4: ICD-10-CM

## 2020-02-03 DIAGNOSIS — N18.2: ICD-10-CM

## 2020-02-04 ENCOUNTER — HOSPITAL ENCOUNTER (OUTPATIENT)
Dept: HOSPITAL 90 - WHH | Age: 60
Discharge: HOME | End: 2020-02-04
Attending: FAMILY MEDICINE
Payer: COMMERCIAL

## 2020-02-04 VITALS — SYSTOLIC BLOOD PRESSURE: 130 MMHG | DIASTOLIC BLOOD PRESSURE: 70 MMHG

## 2020-02-04 VITALS — DIASTOLIC BLOOD PRESSURE: 70 MMHG | SYSTOLIC BLOOD PRESSURE: 104 MMHG

## 2020-02-04 DIAGNOSIS — I25.10: ICD-10-CM

## 2020-02-04 DIAGNOSIS — L97.521: ICD-10-CM

## 2020-02-04 DIAGNOSIS — Z95.1: ICD-10-CM

## 2020-02-04 DIAGNOSIS — Z85.89: ICD-10-CM

## 2020-02-04 DIAGNOSIS — Z88.5: ICD-10-CM

## 2020-02-04 DIAGNOSIS — I25.2: ICD-10-CM

## 2020-02-04 DIAGNOSIS — M19.072: ICD-10-CM

## 2020-02-04 DIAGNOSIS — E11.42: ICD-10-CM

## 2020-02-04 DIAGNOSIS — E11.621: Primary | ICD-10-CM

## 2020-02-04 DIAGNOSIS — M86.672: ICD-10-CM

## 2020-02-04 DIAGNOSIS — E11.319: ICD-10-CM

## 2020-02-04 DIAGNOSIS — Z79.899: ICD-10-CM

## 2020-02-04 DIAGNOSIS — E78.2: ICD-10-CM

## 2020-02-04 DIAGNOSIS — Z86.73: ICD-10-CM

## 2020-02-04 DIAGNOSIS — E11.69: ICD-10-CM

## 2020-02-04 DIAGNOSIS — Z79.4: ICD-10-CM

## 2020-02-04 DIAGNOSIS — K21.9: ICD-10-CM

## 2020-02-04 DIAGNOSIS — N18.2: ICD-10-CM

## 2020-02-04 DIAGNOSIS — E11.22: ICD-10-CM

## 2020-02-04 DIAGNOSIS — Z85.828: ICD-10-CM

## 2020-02-04 DIAGNOSIS — Z88.6: ICD-10-CM

## 2020-02-04 DIAGNOSIS — Z79.82: ICD-10-CM

## 2020-02-04 DIAGNOSIS — E11.51: ICD-10-CM

## 2020-02-04 DIAGNOSIS — E11.21: ICD-10-CM

## 2020-02-04 DIAGNOSIS — I12.9: ICD-10-CM

## 2020-02-05 ENCOUNTER — HOSPITAL ENCOUNTER (OUTPATIENT)
Dept: HOSPITAL 90 - WHH | Age: 60
Discharge: HOME | End: 2020-02-05
Attending: PODIATRIST
Payer: COMMERCIAL

## 2020-02-05 VITALS — DIASTOLIC BLOOD PRESSURE: 58 MMHG | SYSTOLIC BLOOD PRESSURE: 137 MMHG

## 2020-02-05 DIAGNOSIS — E11.22: ICD-10-CM

## 2020-02-05 DIAGNOSIS — Z88.6: ICD-10-CM

## 2020-02-05 DIAGNOSIS — E11.21: ICD-10-CM

## 2020-02-05 DIAGNOSIS — Z85.828: ICD-10-CM

## 2020-02-05 DIAGNOSIS — E78.2: ICD-10-CM

## 2020-02-05 DIAGNOSIS — M86.672: ICD-10-CM

## 2020-02-05 DIAGNOSIS — Z86.73: ICD-10-CM

## 2020-02-05 DIAGNOSIS — Z95.1: ICD-10-CM

## 2020-02-05 DIAGNOSIS — M19.072: ICD-10-CM

## 2020-02-05 DIAGNOSIS — E11.621: Primary | ICD-10-CM

## 2020-02-05 DIAGNOSIS — Z85.89: ICD-10-CM

## 2020-02-05 DIAGNOSIS — L97.521: ICD-10-CM

## 2020-02-05 DIAGNOSIS — E11.42: ICD-10-CM

## 2020-02-05 DIAGNOSIS — I25.2: ICD-10-CM

## 2020-02-05 DIAGNOSIS — E11.51: ICD-10-CM

## 2020-02-05 DIAGNOSIS — I12.9: ICD-10-CM

## 2020-02-05 DIAGNOSIS — Z79.899: ICD-10-CM

## 2020-02-05 DIAGNOSIS — Z79.82: ICD-10-CM

## 2020-02-05 DIAGNOSIS — Z79.4: ICD-10-CM

## 2020-02-05 DIAGNOSIS — E11.69: ICD-10-CM

## 2020-02-05 DIAGNOSIS — N18.2: ICD-10-CM

## 2020-02-05 DIAGNOSIS — E11.319: ICD-10-CM

## 2020-02-05 DIAGNOSIS — I25.10: ICD-10-CM

## 2020-02-05 DIAGNOSIS — Z88.5: ICD-10-CM

## 2020-02-05 DIAGNOSIS — K21.9: ICD-10-CM

## 2020-02-06 ENCOUNTER — HOSPITAL ENCOUNTER (OUTPATIENT)
Dept: HOSPITAL 90 - WHH | Age: 60
Discharge: HOME | End: 2020-02-06
Attending: FAMILY MEDICINE
Payer: COMMERCIAL

## 2020-02-06 VITALS — DIASTOLIC BLOOD PRESSURE: 82 MMHG | SYSTOLIC BLOOD PRESSURE: 136 MMHG

## 2020-02-06 DIAGNOSIS — I25.2: ICD-10-CM

## 2020-02-06 DIAGNOSIS — Z88.6: ICD-10-CM

## 2020-02-06 DIAGNOSIS — Z79.82: ICD-10-CM

## 2020-02-06 DIAGNOSIS — Z88.5: ICD-10-CM

## 2020-02-06 DIAGNOSIS — I12.9: ICD-10-CM

## 2020-02-06 DIAGNOSIS — K21.9: ICD-10-CM

## 2020-02-06 DIAGNOSIS — Z85.828: ICD-10-CM

## 2020-02-06 DIAGNOSIS — Z88.9: ICD-10-CM

## 2020-02-06 DIAGNOSIS — E11.21: ICD-10-CM

## 2020-02-06 DIAGNOSIS — N18.2: ICD-10-CM

## 2020-02-06 DIAGNOSIS — Z86.73: ICD-10-CM

## 2020-02-06 DIAGNOSIS — E11.22: ICD-10-CM

## 2020-02-06 DIAGNOSIS — M86.672: ICD-10-CM

## 2020-02-06 DIAGNOSIS — Z79.4: ICD-10-CM

## 2020-02-06 DIAGNOSIS — E78.2: ICD-10-CM

## 2020-02-06 DIAGNOSIS — Z95.1: ICD-10-CM

## 2020-02-06 DIAGNOSIS — Z85.89: ICD-10-CM

## 2020-02-06 DIAGNOSIS — E11.621: Primary | ICD-10-CM

## 2020-02-06 DIAGNOSIS — E11.51: ICD-10-CM

## 2020-02-06 DIAGNOSIS — L97.521: ICD-10-CM

## 2020-02-06 DIAGNOSIS — E11.42: ICD-10-CM

## 2020-02-06 DIAGNOSIS — I25.10: ICD-10-CM

## 2020-02-06 DIAGNOSIS — E11.319: ICD-10-CM

## 2020-02-06 DIAGNOSIS — E11.69: ICD-10-CM

## 2020-02-06 DIAGNOSIS — Z79.899: ICD-10-CM

## 2020-02-06 DIAGNOSIS — M19.072: ICD-10-CM

## 2020-02-07 ENCOUNTER — HOSPITAL ENCOUNTER (OUTPATIENT)
Dept: HOSPITAL 90 - WHH | Age: 60
Discharge: HOME | End: 2020-02-07
Attending: FAMILY MEDICINE
Payer: COMMERCIAL

## 2020-02-07 VITALS — DIASTOLIC BLOOD PRESSURE: 85 MMHG | SYSTOLIC BLOOD PRESSURE: 125 MMHG

## 2020-02-07 VITALS — DIASTOLIC BLOOD PRESSURE: 76 MMHG | SYSTOLIC BLOOD PRESSURE: 123 MMHG

## 2020-02-07 DIAGNOSIS — I12.9: ICD-10-CM

## 2020-02-07 DIAGNOSIS — Z79.899: ICD-10-CM

## 2020-02-07 DIAGNOSIS — E11.51: ICD-10-CM

## 2020-02-07 DIAGNOSIS — L97.521: ICD-10-CM

## 2020-02-07 DIAGNOSIS — M19.072: ICD-10-CM

## 2020-02-07 DIAGNOSIS — I25.2: ICD-10-CM

## 2020-02-07 DIAGNOSIS — I25.10: ICD-10-CM

## 2020-02-07 DIAGNOSIS — E11.319: ICD-10-CM

## 2020-02-07 DIAGNOSIS — Z79.82: ICD-10-CM

## 2020-02-07 DIAGNOSIS — Z88.6: ICD-10-CM

## 2020-02-07 DIAGNOSIS — K21.9: ICD-10-CM

## 2020-02-07 DIAGNOSIS — Z85.828: ICD-10-CM

## 2020-02-07 DIAGNOSIS — M86.672: ICD-10-CM

## 2020-02-07 DIAGNOSIS — Z85.89: ICD-10-CM

## 2020-02-07 DIAGNOSIS — E11.21: ICD-10-CM

## 2020-02-07 DIAGNOSIS — E11.42: ICD-10-CM

## 2020-02-07 DIAGNOSIS — E11.22: ICD-10-CM

## 2020-02-07 DIAGNOSIS — Z88.8: ICD-10-CM

## 2020-02-07 DIAGNOSIS — Z86.73: ICD-10-CM

## 2020-02-07 DIAGNOSIS — E11.69: ICD-10-CM

## 2020-02-07 DIAGNOSIS — Z95.1: ICD-10-CM

## 2020-02-07 DIAGNOSIS — E78.2: ICD-10-CM

## 2020-02-07 DIAGNOSIS — Z88.5: ICD-10-CM

## 2020-02-07 DIAGNOSIS — E11.621: Primary | ICD-10-CM

## 2020-02-07 DIAGNOSIS — N18.2: ICD-10-CM

## 2020-02-07 DIAGNOSIS — Z79.4: ICD-10-CM

## 2020-02-10 ENCOUNTER — HOSPITAL ENCOUNTER (OUTPATIENT)
Dept: HOSPITAL 90 - WHH | Age: 60
Discharge: HOME | End: 2020-02-10
Attending: FAMILY MEDICINE
Payer: COMMERCIAL

## 2020-02-10 VITALS — DIASTOLIC BLOOD PRESSURE: 78 MMHG | SYSTOLIC BLOOD PRESSURE: 142 MMHG

## 2020-02-10 DIAGNOSIS — Z79.82: ICD-10-CM

## 2020-02-10 DIAGNOSIS — Z85.828: ICD-10-CM

## 2020-02-10 DIAGNOSIS — Z88.8: ICD-10-CM

## 2020-02-10 DIAGNOSIS — E11.42: ICD-10-CM

## 2020-02-10 DIAGNOSIS — Z95.1: ICD-10-CM

## 2020-02-10 DIAGNOSIS — E11.22: ICD-10-CM

## 2020-02-10 DIAGNOSIS — I12.9: ICD-10-CM

## 2020-02-10 DIAGNOSIS — Z79.899: ICD-10-CM

## 2020-02-10 DIAGNOSIS — Z88.5: ICD-10-CM

## 2020-02-10 DIAGNOSIS — Z79.4: ICD-10-CM

## 2020-02-10 DIAGNOSIS — L97.521: ICD-10-CM

## 2020-02-10 DIAGNOSIS — E11.621: Primary | ICD-10-CM

## 2020-02-10 DIAGNOSIS — I25.2: ICD-10-CM

## 2020-02-10 DIAGNOSIS — E11.51: ICD-10-CM

## 2020-02-10 DIAGNOSIS — E11.319: ICD-10-CM

## 2020-02-10 DIAGNOSIS — E78.2: ICD-10-CM

## 2020-02-10 DIAGNOSIS — N18.2: ICD-10-CM

## 2020-02-10 DIAGNOSIS — K21.9: ICD-10-CM

## 2020-02-10 DIAGNOSIS — E11.21: ICD-10-CM

## 2020-02-10 DIAGNOSIS — I25.10: ICD-10-CM

## 2020-02-10 DIAGNOSIS — Z86.73: ICD-10-CM

## 2020-02-10 DIAGNOSIS — Z88.6: ICD-10-CM

## 2020-02-10 DIAGNOSIS — M19.072: ICD-10-CM

## 2020-02-10 DIAGNOSIS — M86.672: ICD-10-CM

## 2020-02-10 DIAGNOSIS — Z85.89: ICD-10-CM

## 2020-02-10 DIAGNOSIS — E11.69: ICD-10-CM

## 2020-02-10 PROCEDURE — 82948 REAGENT STRIP/BLOOD GLUCOSE: CPT

## 2020-02-11 ENCOUNTER — HOSPITAL ENCOUNTER (OUTPATIENT)
Dept: HOSPITAL 90 - WHH | Age: 60
Discharge: HOME | End: 2020-02-11
Attending: FAMILY MEDICINE
Payer: COMMERCIAL

## 2020-02-11 VITALS — DIASTOLIC BLOOD PRESSURE: 73 MMHG | SYSTOLIC BLOOD PRESSURE: 112 MMHG

## 2020-02-11 VITALS — DIASTOLIC BLOOD PRESSURE: 70 MMHG | SYSTOLIC BLOOD PRESSURE: 120 MMHG

## 2020-02-11 DIAGNOSIS — Z85.89: ICD-10-CM

## 2020-02-11 DIAGNOSIS — Z79.899: ICD-10-CM

## 2020-02-11 DIAGNOSIS — Z95.1: ICD-10-CM

## 2020-02-11 DIAGNOSIS — I25.2: ICD-10-CM

## 2020-02-11 DIAGNOSIS — E11.69: ICD-10-CM

## 2020-02-11 DIAGNOSIS — E11.621: Primary | ICD-10-CM

## 2020-02-11 DIAGNOSIS — M19.072: ICD-10-CM

## 2020-02-11 DIAGNOSIS — Z88.8: ICD-10-CM

## 2020-02-11 DIAGNOSIS — E11.51: ICD-10-CM

## 2020-02-11 DIAGNOSIS — Z88.6: ICD-10-CM

## 2020-02-11 DIAGNOSIS — E11.42: ICD-10-CM

## 2020-02-11 DIAGNOSIS — Z79.4: ICD-10-CM

## 2020-02-11 DIAGNOSIS — K21.9: ICD-10-CM

## 2020-02-11 DIAGNOSIS — I12.9: ICD-10-CM

## 2020-02-11 DIAGNOSIS — N18.2: ICD-10-CM

## 2020-02-11 DIAGNOSIS — I25.10: ICD-10-CM

## 2020-02-11 DIAGNOSIS — Z85.828: ICD-10-CM

## 2020-02-11 DIAGNOSIS — L97.521: ICD-10-CM

## 2020-02-11 DIAGNOSIS — Z79.82: ICD-10-CM

## 2020-02-11 DIAGNOSIS — E11.319: ICD-10-CM

## 2020-02-11 DIAGNOSIS — E11.22: ICD-10-CM

## 2020-02-11 DIAGNOSIS — M86.672: ICD-10-CM

## 2020-02-11 DIAGNOSIS — E11.21: ICD-10-CM

## 2020-02-11 DIAGNOSIS — Z86.73: ICD-10-CM

## 2020-02-11 DIAGNOSIS — Z88.5: ICD-10-CM

## 2020-02-11 DIAGNOSIS — E78.2: ICD-10-CM

## 2020-02-11 PROCEDURE — 82948 REAGENT STRIP/BLOOD GLUCOSE: CPT

## 2020-02-14 ENCOUNTER — HOSPITAL ENCOUNTER (OUTPATIENT)
Dept: HOSPITAL 90 - WHH | Age: 60
Discharge: HOME | End: 2020-02-14
Attending: FAMILY MEDICINE
Payer: COMMERCIAL

## 2020-02-14 VITALS — DIASTOLIC BLOOD PRESSURE: 55 MMHG | SYSTOLIC BLOOD PRESSURE: 100 MMHG

## 2020-02-14 DIAGNOSIS — Z85.828: ICD-10-CM

## 2020-02-14 DIAGNOSIS — L97.521: ICD-10-CM

## 2020-02-14 DIAGNOSIS — I25.10: ICD-10-CM

## 2020-02-14 DIAGNOSIS — N18.2: ICD-10-CM

## 2020-02-14 DIAGNOSIS — E11.319: ICD-10-CM

## 2020-02-14 DIAGNOSIS — Z88.6: ICD-10-CM

## 2020-02-14 DIAGNOSIS — K21.9: ICD-10-CM

## 2020-02-14 DIAGNOSIS — Z88.8: ICD-10-CM

## 2020-02-14 DIAGNOSIS — Z85.89: ICD-10-CM

## 2020-02-14 DIAGNOSIS — E11.621: Primary | ICD-10-CM

## 2020-02-14 DIAGNOSIS — Z79.899: ICD-10-CM

## 2020-02-14 DIAGNOSIS — E11.51: ICD-10-CM

## 2020-02-14 DIAGNOSIS — M19.072: ICD-10-CM

## 2020-02-14 DIAGNOSIS — E11.22: ICD-10-CM

## 2020-02-14 DIAGNOSIS — I25.2: ICD-10-CM

## 2020-02-14 DIAGNOSIS — E78.2: ICD-10-CM

## 2020-02-14 DIAGNOSIS — Z79.4: ICD-10-CM

## 2020-02-14 DIAGNOSIS — Z88.5: ICD-10-CM

## 2020-02-14 DIAGNOSIS — M86.672: ICD-10-CM

## 2020-02-14 DIAGNOSIS — Z79.82: ICD-10-CM

## 2020-02-14 DIAGNOSIS — Z95.1: ICD-10-CM

## 2020-02-14 DIAGNOSIS — I12.9: ICD-10-CM

## 2020-02-14 DIAGNOSIS — E11.69: ICD-10-CM

## 2020-02-14 DIAGNOSIS — Z86.73: ICD-10-CM

## 2020-02-14 DIAGNOSIS — E11.21: ICD-10-CM

## 2020-02-14 DIAGNOSIS — E11.42: ICD-10-CM

## 2020-02-14 PROCEDURE — 82948 REAGENT STRIP/BLOOD GLUCOSE: CPT

## 2020-02-18 ENCOUNTER — HOSPITAL ENCOUNTER (OUTPATIENT)
Dept: HOSPITAL 90 - WHH | Age: 60
Discharge: HOME | End: 2020-02-18
Attending: FAMILY MEDICINE
Payer: COMMERCIAL

## 2020-02-18 VITALS — DIASTOLIC BLOOD PRESSURE: 78 MMHG | SYSTOLIC BLOOD PRESSURE: 121 MMHG

## 2020-02-18 DIAGNOSIS — Z88.6: ICD-10-CM

## 2020-02-18 DIAGNOSIS — E11.42: ICD-10-CM

## 2020-02-18 DIAGNOSIS — M19.072: ICD-10-CM

## 2020-02-18 DIAGNOSIS — N18.2: ICD-10-CM

## 2020-02-18 DIAGNOSIS — I12.9: ICD-10-CM

## 2020-02-18 DIAGNOSIS — Z79.4: ICD-10-CM

## 2020-02-18 DIAGNOSIS — E78.2: ICD-10-CM

## 2020-02-18 DIAGNOSIS — L97.521: ICD-10-CM

## 2020-02-18 DIAGNOSIS — E11.621: Primary | ICD-10-CM

## 2020-02-18 DIAGNOSIS — E11.69: ICD-10-CM

## 2020-02-18 DIAGNOSIS — E11.51: ICD-10-CM

## 2020-02-18 DIAGNOSIS — Z88.5: ICD-10-CM

## 2020-02-18 DIAGNOSIS — E11.21: ICD-10-CM

## 2020-02-18 DIAGNOSIS — Z85.828: ICD-10-CM

## 2020-02-18 DIAGNOSIS — Z79.82: ICD-10-CM

## 2020-02-18 DIAGNOSIS — E11.319: ICD-10-CM

## 2020-02-18 DIAGNOSIS — K21.9: ICD-10-CM

## 2020-02-18 DIAGNOSIS — I25.10: ICD-10-CM

## 2020-02-18 DIAGNOSIS — E11.22: ICD-10-CM

## 2020-02-18 DIAGNOSIS — Z88.8: ICD-10-CM

## 2020-02-18 DIAGNOSIS — M86.672: ICD-10-CM

## 2020-02-18 DIAGNOSIS — Z79.899: ICD-10-CM

## 2020-02-18 DIAGNOSIS — Z95.1: ICD-10-CM

## 2020-02-18 DIAGNOSIS — I25.2: ICD-10-CM

## 2020-02-18 DIAGNOSIS — Z86.73: ICD-10-CM

## 2020-02-18 DIAGNOSIS — Z85.89: ICD-10-CM

## 2020-02-18 PROCEDURE — 82948 REAGENT STRIP/BLOOD GLUCOSE: CPT

## 2020-02-19 ENCOUNTER — HOSPITAL ENCOUNTER (OUTPATIENT)
Dept: HOSPITAL 90 - WHH | Age: 60
Discharge: HOME | End: 2020-02-19
Attending: PODIATRIST
Payer: COMMERCIAL

## 2020-02-19 VITALS — DIASTOLIC BLOOD PRESSURE: 81 MMHG | SYSTOLIC BLOOD PRESSURE: 134 MMHG

## 2020-02-19 VITALS — DIASTOLIC BLOOD PRESSURE: 86 MMHG | SYSTOLIC BLOOD PRESSURE: 135 MMHG

## 2020-02-19 DIAGNOSIS — K21.9: ICD-10-CM

## 2020-02-19 DIAGNOSIS — L97.522: ICD-10-CM

## 2020-02-19 DIAGNOSIS — E11.51: ICD-10-CM

## 2020-02-19 DIAGNOSIS — Z95.1: ICD-10-CM

## 2020-02-19 DIAGNOSIS — E11.21: ICD-10-CM

## 2020-02-19 DIAGNOSIS — I12.9: ICD-10-CM

## 2020-02-19 DIAGNOSIS — E11.69: ICD-10-CM

## 2020-02-19 DIAGNOSIS — E11.22: ICD-10-CM

## 2020-02-19 DIAGNOSIS — N18.2: ICD-10-CM

## 2020-02-19 DIAGNOSIS — E78.2: ICD-10-CM

## 2020-02-19 DIAGNOSIS — Z79.82: ICD-10-CM

## 2020-02-19 DIAGNOSIS — E11.319: ICD-10-CM

## 2020-02-19 DIAGNOSIS — Z86.73: ICD-10-CM

## 2020-02-19 DIAGNOSIS — Z88.5: ICD-10-CM

## 2020-02-19 DIAGNOSIS — Z85.828: ICD-10-CM

## 2020-02-19 DIAGNOSIS — Z88.6: ICD-10-CM

## 2020-02-19 DIAGNOSIS — I25.10: ICD-10-CM

## 2020-02-19 DIAGNOSIS — Z79.899: ICD-10-CM

## 2020-02-19 DIAGNOSIS — E11.42: ICD-10-CM

## 2020-02-19 DIAGNOSIS — I25.2: ICD-10-CM

## 2020-02-19 DIAGNOSIS — Z79.4: ICD-10-CM

## 2020-02-19 DIAGNOSIS — Z85.89: ICD-10-CM

## 2020-02-19 DIAGNOSIS — E11.621: Primary | ICD-10-CM

## 2020-02-19 DIAGNOSIS — M19.072: ICD-10-CM

## 2020-02-19 DIAGNOSIS — M86.672: ICD-10-CM

## 2020-02-19 DIAGNOSIS — Z88.8: ICD-10-CM

## 2020-02-19 PROCEDURE — 82948 REAGENT STRIP/BLOOD GLUCOSE: CPT

## 2020-02-19 PROCEDURE — 11042 DBRDMT SUBQ TIS 1ST 20SQCM/<: CPT

## 2020-02-19 PROCEDURE — 87070 CULTURE OTHR SPECIMN AEROBIC: CPT

## 2020-02-20 ENCOUNTER — HOSPITAL ENCOUNTER (OUTPATIENT)
Dept: HOSPITAL 90 - WHH | Age: 60
Discharge: HOME | End: 2020-02-20
Attending: FAMILY MEDICINE
Payer: COMMERCIAL

## 2020-02-20 VITALS — DIASTOLIC BLOOD PRESSURE: 73 MMHG | SYSTOLIC BLOOD PRESSURE: 121 MMHG

## 2020-02-20 VITALS — DIASTOLIC BLOOD PRESSURE: 92 MMHG | SYSTOLIC BLOOD PRESSURE: 146 MMHG

## 2020-02-20 DIAGNOSIS — N18.2: ICD-10-CM

## 2020-02-20 DIAGNOSIS — Z86.73: ICD-10-CM

## 2020-02-20 DIAGNOSIS — Z95.1: ICD-10-CM

## 2020-02-20 DIAGNOSIS — I12.9: ICD-10-CM

## 2020-02-20 DIAGNOSIS — L97.521: ICD-10-CM

## 2020-02-20 DIAGNOSIS — K21.9: ICD-10-CM

## 2020-02-20 DIAGNOSIS — M19.072: ICD-10-CM

## 2020-02-20 DIAGNOSIS — Z88.8: ICD-10-CM

## 2020-02-20 DIAGNOSIS — M86.672: ICD-10-CM

## 2020-02-20 DIAGNOSIS — E11.22: ICD-10-CM

## 2020-02-20 DIAGNOSIS — E11.21: ICD-10-CM

## 2020-02-20 DIAGNOSIS — E11.51: ICD-10-CM

## 2020-02-20 DIAGNOSIS — Z85.828: ICD-10-CM

## 2020-02-20 DIAGNOSIS — Z79.899: ICD-10-CM

## 2020-02-20 DIAGNOSIS — Z85.89: ICD-10-CM

## 2020-02-20 DIAGNOSIS — E11.319: ICD-10-CM

## 2020-02-20 DIAGNOSIS — E11.42: ICD-10-CM

## 2020-02-20 DIAGNOSIS — I25.10: ICD-10-CM

## 2020-02-20 DIAGNOSIS — Z88.6: ICD-10-CM

## 2020-02-20 DIAGNOSIS — E78.2: ICD-10-CM

## 2020-02-20 DIAGNOSIS — Z79.82: ICD-10-CM

## 2020-02-20 DIAGNOSIS — I25.2: ICD-10-CM

## 2020-02-20 DIAGNOSIS — Z79.4: ICD-10-CM

## 2020-02-20 DIAGNOSIS — E11.69: ICD-10-CM

## 2020-02-20 DIAGNOSIS — E11.621: Primary | ICD-10-CM

## 2020-02-20 DIAGNOSIS — Z88.5: ICD-10-CM

## 2020-02-20 PROCEDURE — 82948 REAGENT STRIP/BLOOD GLUCOSE: CPT

## 2020-02-24 ENCOUNTER — HOSPITAL ENCOUNTER (OUTPATIENT)
Dept: HOSPITAL 90 - WHH | Age: 60
Discharge: HOME | End: 2020-02-24
Attending: FAMILY MEDICINE
Payer: COMMERCIAL

## 2020-02-24 VITALS — SYSTOLIC BLOOD PRESSURE: 124 MMHG | DIASTOLIC BLOOD PRESSURE: 72 MMHG

## 2020-02-24 DIAGNOSIS — L97.521: ICD-10-CM

## 2020-02-24 DIAGNOSIS — Z88.5: ICD-10-CM

## 2020-02-24 DIAGNOSIS — E11.319: ICD-10-CM

## 2020-02-24 DIAGNOSIS — I25.2: ICD-10-CM

## 2020-02-24 DIAGNOSIS — E11.69: ICD-10-CM

## 2020-02-24 DIAGNOSIS — Z86.73: ICD-10-CM

## 2020-02-24 DIAGNOSIS — Z79.4: ICD-10-CM

## 2020-02-24 DIAGNOSIS — E78.2: ICD-10-CM

## 2020-02-24 DIAGNOSIS — M19.072: ICD-10-CM

## 2020-02-24 DIAGNOSIS — N18.2: ICD-10-CM

## 2020-02-24 DIAGNOSIS — E11.621: Primary | ICD-10-CM

## 2020-02-24 DIAGNOSIS — M86.672: ICD-10-CM

## 2020-02-24 DIAGNOSIS — I12.9: ICD-10-CM

## 2020-02-24 DIAGNOSIS — K21.9: ICD-10-CM

## 2020-02-24 DIAGNOSIS — Z88.8: ICD-10-CM

## 2020-02-24 DIAGNOSIS — Z85.89: ICD-10-CM

## 2020-02-24 DIAGNOSIS — E11.21: ICD-10-CM

## 2020-02-24 DIAGNOSIS — I25.10: ICD-10-CM

## 2020-02-24 DIAGNOSIS — Z88.6: ICD-10-CM

## 2020-02-24 DIAGNOSIS — E11.51: ICD-10-CM

## 2020-02-24 DIAGNOSIS — E11.42: ICD-10-CM

## 2020-02-24 DIAGNOSIS — Z79.82: ICD-10-CM

## 2020-02-24 DIAGNOSIS — Z85.828: ICD-10-CM

## 2020-02-24 DIAGNOSIS — Z95.1: ICD-10-CM

## 2020-02-24 DIAGNOSIS — E11.22: ICD-10-CM

## 2020-02-24 DIAGNOSIS — Z79.899: ICD-10-CM

## 2020-02-24 PROCEDURE — 82948 REAGENT STRIP/BLOOD GLUCOSE: CPT

## 2020-02-25 ENCOUNTER — HOSPITAL ENCOUNTER (OUTPATIENT)
Dept: HOSPITAL 90 - WHH | Age: 60
Discharge: HOME | End: 2020-02-25
Attending: FAMILY MEDICINE
Payer: COMMERCIAL

## 2020-02-25 VITALS — DIASTOLIC BLOOD PRESSURE: 94 MMHG | SYSTOLIC BLOOD PRESSURE: 140 MMHG

## 2020-02-25 VITALS — DIASTOLIC BLOOD PRESSURE: 88 MMHG | SYSTOLIC BLOOD PRESSURE: 166 MMHG

## 2020-02-25 DIAGNOSIS — E11.22: ICD-10-CM

## 2020-02-25 DIAGNOSIS — E11.319: ICD-10-CM

## 2020-02-25 DIAGNOSIS — Z79.82: ICD-10-CM

## 2020-02-25 DIAGNOSIS — L97.521: ICD-10-CM

## 2020-02-25 DIAGNOSIS — M86.672: ICD-10-CM

## 2020-02-25 DIAGNOSIS — Z88.6: ICD-10-CM

## 2020-02-25 DIAGNOSIS — Z88.8: ICD-10-CM

## 2020-02-25 DIAGNOSIS — Z95.1: ICD-10-CM

## 2020-02-25 DIAGNOSIS — I25.10: ICD-10-CM

## 2020-02-25 DIAGNOSIS — I12.9: ICD-10-CM

## 2020-02-25 DIAGNOSIS — E11.21: ICD-10-CM

## 2020-02-25 DIAGNOSIS — E11.51: ICD-10-CM

## 2020-02-25 DIAGNOSIS — E11.42: ICD-10-CM

## 2020-02-25 DIAGNOSIS — Z85.89: ICD-10-CM

## 2020-02-25 DIAGNOSIS — I25.2: ICD-10-CM

## 2020-02-25 DIAGNOSIS — N18.2: ICD-10-CM

## 2020-02-25 DIAGNOSIS — E78.2: ICD-10-CM

## 2020-02-25 DIAGNOSIS — M19.072: ICD-10-CM

## 2020-02-25 DIAGNOSIS — Z88.5: ICD-10-CM

## 2020-02-25 DIAGNOSIS — Z79.899: ICD-10-CM

## 2020-02-25 DIAGNOSIS — K21.9: ICD-10-CM

## 2020-02-25 DIAGNOSIS — Z85.828: ICD-10-CM

## 2020-02-25 DIAGNOSIS — Z79.4: ICD-10-CM

## 2020-02-25 DIAGNOSIS — Z86.73: ICD-10-CM

## 2020-02-25 DIAGNOSIS — E11.621: Primary | ICD-10-CM

## 2020-02-25 DIAGNOSIS — E11.69: ICD-10-CM

## 2020-02-25 PROCEDURE — 82948 REAGENT STRIP/BLOOD GLUCOSE: CPT

## 2020-02-26 ENCOUNTER — HOSPITAL ENCOUNTER (OUTPATIENT)
Dept: HOSPITAL 90 - WHH | Age: 60
Discharge: HOME | End: 2020-02-26
Attending: PODIATRIST
Payer: COMMERCIAL

## 2020-02-26 VITALS — SYSTOLIC BLOOD PRESSURE: 111 MMHG | DIASTOLIC BLOOD PRESSURE: 65 MMHG

## 2020-02-26 VITALS — DIASTOLIC BLOOD PRESSURE: 74 MMHG | SYSTOLIC BLOOD PRESSURE: 125 MMHG

## 2020-02-26 DIAGNOSIS — Z79.899: ICD-10-CM

## 2020-02-26 DIAGNOSIS — Z95.1: ICD-10-CM

## 2020-02-26 DIAGNOSIS — E11.69: ICD-10-CM

## 2020-02-26 DIAGNOSIS — Z88.6: ICD-10-CM

## 2020-02-26 DIAGNOSIS — E11.621: Primary | ICD-10-CM

## 2020-02-26 DIAGNOSIS — E11.21: ICD-10-CM

## 2020-02-26 DIAGNOSIS — M86.672: ICD-10-CM

## 2020-02-26 DIAGNOSIS — E11.42: ICD-10-CM

## 2020-02-26 DIAGNOSIS — I25.10: ICD-10-CM

## 2020-02-26 DIAGNOSIS — E11.22: ICD-10-CM

## 2020-02-26 DIAGNOSIS — L97.521: ICD-10-CM

## 2020-02-26 DIAGNOSIS — Z88.8: ICD-10-CM

## 2020-02-26 DIAGNOSIS — Z86.73: ICD-10-CM

## 2020-02-26 DIAGNOSIS — M19.072: ICD-10-CM

## 2020-02-26 DIAGNOSIS — Z79.4: ICD-10-CM

## 2020-02-26 DIAGNOSIS — Z79.82: ICD-10-CM

## 2020-02-26 DIAGNOSIS — E78.2: ICD-10-CM

## 2020-02-26 DIAGNOSIS — Z85.89: ICD-10-CM

## 2020-02-26 DIAGNOSIS — Z85.828: ICD-10-CM

## 2020-02-26 DIAGNOSIS — E11.51: ICD-10-CM

## 2020-02-26 DIAGNOSIS — I12.9: ICD-10-CM

## 2020-02-26 DIAGNOSIS — K21.9: ICD-10-CM

## 2020-02-26 DIAGNOSIS — N18.2: ICD-10-CM

## 2020-02-26 DIAGNOSIS — Z88.5: ICD-10-CM

## 2020-02-26 DIAGNOSIS — E11.319: ICD-10-CM

## 2020-02-26 DIAGNOSIS — I25.2: ICD-10-CM

## 2020-02-26 PROCEDURE — 82948 REAGENT STRIP/BLOOD GLUCOSE: CPT

## 2020-02-27 ENCOUNTER — HOSPITAL ENCOUNTER (OUTPATIENT)
Dept: HOSPITAL 90 - WHH | Age: 60
Discharge: HOME | End: 2020-02-27
Attending: FAMILY MEDICINE
Payer: COMMERCIAL

## 2020-02-27 VITALS — SYSTOLIC BLOOD PRESSURE: 137 MMHG | DIASTOLIC BLOOD PRESSURE: 81 MMHG

## 2020-02-27 DIAGNOSIS — E11.319: ICD-10-CM

## 2020-02-27 DIAGNOSIS — E78.2: ICD-10-CM

## 2020-02-27 DIAGNOSIS — I12.9: ICD-10-CM

## 2020-02-27 DIAGNOSIS — Z88.8: ICD-10-CM

## 2020-02-27 DIAGNOSIS — Z95.1: ICD-10-CM

## 2020-02-27 DIAGNOSIS — Z88.6: ICD-10-CM

## 2020-02-27 DIAGNOSIS — I25.2: ICD-10-CM

## 2020-02-27 DIAGNOSIS — E11.621: Primary | ICD-10-CM

## 2020-02-27 DIAGNOSIS — M19.072: ICD-10-CM

## 2020-02-27 DIAGNOSIS — Z79.4: ICD-10-CM

## 2020-02-27 DIAGNOSIS — Z86.73: ICD-10-CM

## 2020-02-27 DIAGNOSIS — Z79.899: ICD-10-CM

## 2020-02-27 DIAGNOSIS — N18.2: ICD-10-CM

## 2020-02-27 DIAGNOSIS — E11.42: ICD-10-CM

## 2020-02-27 DIAGNOSIS — I25.10: ICD-10-CM

## 2020-02-27 DIAGNOSIS — K21.9: ICD-10-CM

## 2020-02-27 DIAGNOSIS — Z88.5: ICD-10-CM

## 2020-02-27 DIAGNOSIS — E11.22: ICD-10-CM

## 2020-02-27 DIAGNOSIS — Z85.89: ICD-10-CM

## 2020-02-27 DIAGNOSIS — E11.69: ICD-10-CM

## 2020-02-27 DIAGNOSIS — L97.521: ICD-10-CM

## 2020-02-27 DIAGNOSIS — E11.21: ICD-10-CM

## 2020-02-27 DIAGNOSIS — E11.51: ICD-10-CM

## 2020-02-27 DIAGNOSIS — Z79.82: ICD-10-CM

## 2020-02-27 DIAGNOSIS — M86.672: ICD-10-CM

## 2020-02-27 DIAGNOSIS — Z85.828: ICD-10-CM

## 2020-02-27 PROCEDURE — 82948 REAGENT STRIP/BLOOD GLUCOSE: CPT

## 2020-02-28 ENCOUNTER — HOSPITAL ENCOUNTER (OUTPATIENT)
Dept: HOSPITAL 90 - WHH | Age: 60
Discharge: HOME | End: 2020-02-28
Attending: FAMILY MEDICINE
Payer: COMMERCIAL

## 2020-02-28 VITALS — DIASTOLIC BLOOD PRESSURE: 72 MMHG | SYSTOLIC BLOOD PRESSURE: 103 MMHG

## 2020-02-28 VITALS — SYSTOLIC BLOOD PRESSURE: 109 MMHG | DIASTOLIC BLOOD PRESSURE: 65 MMHG

## 2020-02-28 DIAGNOSIS — L97.521: ICD-10-CM

## 2020-02-28 DIAGNOSIS — Z79.899: ICD-10-CM

## 2020-02-28 DIAGNOSIS — Z95.1: ICD-10-CM

## 2020-02-28 DIAGNOSIS — E11.319: ICD-10-CM

## 2020-02-28 DIAGNOSIS — E11.621: Primary | ICD-10-CM

## 2020-02-28 DIAGNOSIS — Z86.73: ICD-10-CM

## 2020-02-28 DIAGNOSIS — E11.21: ICD-10-CM

## 2020-02-28 DIAGNOSIS — M19.072: ICD-10-CM

## 2020-02-28 DIAGNOSIS — I25.2: ICD-10-CM

## 2020-02-28 DIAGNOSIS — E11.42: ICD-10-CM

## 2020-02-28 DIAGNOSIS — E11.22: ICD-10-CM

## 2020-02-28 DIAGNOSIS — E11.51: ICD-10-CM

## 2020-02-28 DIAGNOSIS — K21.9: ICD-10-CM

## 2020-02-28 DIAGNOSIS — E78.2: ICD-10-CM

## 2020-02-28 DIAGNOSIS — Z85.89: ICD-10-CM

## 2020-02-28 DIAGNOSIS — Z88.8: ICD-10-CM

## 2020-02-28 DIAGNOSIS — I25.10: ICD-10-CM

## 2020-02-28 DIAGNOSIS — Z85.828: ICD-10-CM

## 2020-02-28 DIAGNOSIS — Z88.5: ICD-10-CM

## 2020-02-28 DIAGNOSIS — E11.69: ICD-10-CM

## 2020-02-28 DIAGNOSIS — Z79.4: ICD-10-CM

## 2020-02-28 DIAGNOSIS — I12.9: ICD-10-CM

## 2020-02-28 DIAGNOSIS — M86.672: ICD-10-CM

## 2020-02-28 DIAGNOSIS — Z88.6: ICD-10-CM

## 2020-02-28 DIAGNOSIS — N18.2: ICD-10-CM

## 2020-02-28 DIAGNOSIS — Z79.82: ICD-10-CM

## 2020-02-28 PROCEDURE — 82948 REAGENT STRIP/BLOOD GLUCOSE: CPT

## 2020-03-03 ENCOUNTER — HOSPITAL ENCOUNTER (OUTPATIENT)
Dept: HOSPITAL 90 - WHH | Age: 60
Discharge: HOME | End: 2020-03-03
Attending: FAMILY MEDICINE
Payer: COMMERCIAL

## 2020-03-03 VITALS — SYSTOLIC BLOOD PRESSURE: 137 MMHG | DIASTOLIC BLOOD PRESSURE: 82 MMHG

## 2020-03-03 VITALS — DIASTOLIC BLOOD PRESSURE: 72 MMHG | SYSTOLIC BLOOD PRESSURE: 127 MMHG

## 2020-03-03 DIAGNOSIS — E11.69: ICD-10-CM

## 2020-03-03 DIAGNOSIS — E11.22: ICD-10-CM

## 2020-03-03 DIAGNOSIS — I12.9: ICD-10-CM

## 2020-03-03 DIAGNOSIS — E11.51: ICD-10-CM

## 2020-03-03 DIAGNOSIS — Z79.899: ICD-10-CM

## 2020-03-03 DIAGNOSIS — E11.42: ICD-10-CM

## 2020-03-03 DIAGNOSIS — E78.2: ICD-10-CM

## 2020-03-03 DIAGNOSIS — Z88.6: ICD-10-CM

## 2020-03-03 DIAGNOSIS — Z88.5: ICD-10-CM

## 2020-03-03 DIAGNOSIS — I25.10: ICD-10-CM

## 2020-03-03 DIAGNOSIS — I25.2: ICD-10-CM

## 2020-03-03 DIAGNOSIS — Z79.82: ICD-10-CM

## 2020-03-03 DIAGNOSIS — Z79.4: ICD-10-CM

## 2020-03-03 DIAGNOSIS — Z85.89: ICD-10-CM

## 2020-03-03 DIAGNOSIS — E11.21: ICD-10-CM

## 2020-03-03 DIAGNOSIS — Z85.828: ICD-10-CM

## 2020-03-03 DIAGNOSIS — L97.521: ICD-10-CM

## 2020-03-03 DIAGNOSIS — Z86.73: ICD-10-CM

## 2020-03-03 DIAGNOSIS — Z88.8: ICD-10-CM

## 2020-03-03 DIAGNOSIS — M86.672: ICD-10-CM

## 2020-03-03 DIAGNOSIS — Z95.1: ICD-10-CM

## 2020-03-03 DIAGNOSIS — K21.9: ICD-10-CM

## 2020-03-03 DIAGNOSIS — E11.319: ICD-10-CM

## 2020-03-03 DIAGNOSIS — E11.621: Primary | ICD-10-CM

## 2020-03-03 DIAGNOSIS — N18.2: ICD-10-CM

## 2020-03-03 DIAGNOSIS — M19.072: ICD-10-CM

## 2020-03-03 PROCEDURE — 82948 REAGENT STRIP/BLOOD GLUCOSE: CPT

## 2020-03-04 ENCOUNTER — HOSPITAL ENCOUNTER (EMERGENCY)
Dept: HOSPITAL 90 - EDH | Age: 60
Discharge: HOME | End: 2020-03-04
Payer: COMMERCIAL

## 2020-03-04 ENCOUNTER — HOSPITAL ENCOUNTER (OUTPATIENT)
Dept: HOSPITAL 90 - WHH | Age: 60
Discharge: HOME | End: 2020-03-04
Attending: PODIATRIST
Payer: COMMERCIAL

## 2020-03-04 VITALS — SYSTOLIC BLOOD PRESSURE: 130 MMHG | DIASTOLIC BLOOD PRESSURE: 84 MMHG

## 2020-03-04 DIAGNOSIS — Z85.89: ICD-10-CM

## 2020-03-04 DIAGNOSIS — Z86.73: ICD-10-CM

## 2020-03-04 DIAGNOSIS — K21.9: ICD-10-CM

## 2020-03-04 DIAGNOSIS — E11.69: ICD-10-CM

## 2020-03-04 DIAGNOSIS — L97.509: ICD-10-CM

## 2020-03-04 DIAGNOSIS — Z88.6: ICD-10-CM

## 2020-03-04 DIAGNOSIS — I25.2: ICD-10-CM

## 2020-03-04 DIAGNOSIS — E11.65: Primary | ICD-10-CM

## 2020-03-04 DIAGNOSIS — L97.521: ICD-10-CM

## 2020-03-04 DIAGNOSIS — E11.319: ICD-10-CM

## 2020-03-04 DIAGNOSIS — M86.672: ICD-10-CM

## 2020-03-04 DIAGNOSIS — E78.2: ICD-10-CM

## 2020-03-04 DIAGNOSIS — E11.42: ICD-10-CM

## 2020-03-04 DIAGNOSIS — E11.21: ICD-10-CM

## 2020-03-04 DIAGNOSIS — I12.9: ICD-10-CM

## 2020-03-04 DIAGNOSIS — Z79.82: ICD-10-CM

## 2020-03-04 DIAGNOSIS — Z79.4: ICD-10-CM

## 2020-03-04 DIAGNOSIS — N18.2: ICD-10-CM

## 2020-03-04 DIAGNOSIS — E11.621: Primary | ICD-10-CM

## 2020-03-04 DIAGNOSIS — E11.22: ICD-10-CM

## 2020-03-04 DIAGNOSIS — I25.10: ICD-10-CM

## 2020-03-04 DIAGNOSIS — Z85.828: ICD-10-CM

## 2020-03-04 DIAGNOSIS — Z88.8: ICD-10-CM

## 2020-03-04 DIAGNOSIS — I10: ICD-10-CM

## 2020-03-04 DIAGNOSIS — Z79.899: ICD-10-CM

## 2020-03-04 DIAGNOSIS — Z95.1: ICD-10-CM

## 2020-03-04 DIAGNOSIS — M19.072: ICD-10-CM

## 2020-03-04 DIAGNOSIS — Z88.5: ICD-10-CM

## 2020-03-04 DIAGNOSIS — E11.51: ICD-10-CM

## 2020-03-04 LAB
ALBUMIN SERPL-MCNC: 3.6 G/DL (ref 3.5–5)
ALT SERPL-CCNC: 24 U/L (ref 12–78)
AST SERPL-CCNC: 13 U/L (ref 10–37)
BASOPHILS NFR BLD AUTO: 0.2 % (ref 0–5)
BILIRUB SERPL-MCNC: 0.5 MG/DL (ref 0.2–1)
BUN SERPL-MCNC: 26 MG/DL (ref 7–18)
CHLORIDE SERPL-SCNC: 94 MMOL/L (ref 101–111)
CO2 SERPL-SCNC: 29 MMOL/L (ref 21–32)
CREAT SERPL-MCNC: 1.4 MG/DL (ref 0.5–1.5)
EOSINOPHIL NFR BLD AUTO: 1 % (ref 0–8)
ERYTHROCYTE [DISTWIDTH] IN BLOOD BY AUTOMATED COUNT: 13.9 % (ref 11–15.5)
GFR SERPL CREATININE-BSD FRML MDRD: 55 ML/MIN (ref 60–?)
GLUCOSE SERPL-MCNC: 542 MG/DL (ref 70–105)
GLUCOSE UR STRIP-MCNC: >=1000 MG/DL
HCT VFR BLD AUTO: 41.2 % (ref 42–54)
LYMPHOCYTES NFR SPEC AUTO: 23.9 % (ref 21–51)
MCH RBC QN AUTO: 32.4 PG (ref 27–33)
MCHC RBC AUTO-ENTMCNC: 33.7 G/DL (ref 32–36)
MCV RBC AUTO: 96 FL (ref 79–99)
MONOCYTES NFR BLD AUTO: 9.9 % (ref 3–13)
NEUTROPHILS NFR BLD AUTO: 64.6 % (ref 40–77)
NRBC BLD MANUAL-RTO: 0 % (ref 0–0.19)
PH UR STRIP: 5 [PH] (ref 5–8)
PLATELET # BLD AUTO: 152 K/UL (ref 130–400)
POTASSIUM SERPL-SCNC: 5.5 MMOL/L (ref 3.5–5.1)
PROT SERPL-MCNC: 7.7 G/DL (ref 6–8.3)
RBC # BLD AUTO: 4.29 MIL/UL (ref 4.5–6.2)
RBC #/AREA URNS HPF: (no result) /HPF (ref 0–1)
SODIUM SERPL-SCNC: 130 MMOL/L (ref 136–145)
SP GR UR STRIP: 1.03 (ref 1–1.03)
UROBILINOGEN UR STRIP-MCNC: 0.2 MG/DL (ref 0.2–1)
WBC # BLD AUTO: 5 K/UL (ref 4.8–10.8)

## 2020-03-04 PROCEDURE — 96361 HYDRATE IV INFUSION ADD-ON: CPT

## 2020-03-04 PROCEDURE — 81001 URINALYSIS AUTO W/SCOPE: CPT

## 2020-03-04 PROCEDURE — 99211 OFF/OP EST MAY X REQ PHY/QHP: CPT

## 2020-03-04 PROCEDURE — 80053 COMPREHEN METABOLIC PANEL: CPT

## 2020-03-04 PROCEDURE — 99283 EMERGENCY DEPT VISIT LOW MDM: CPT

## 2020-03-04 PROCEDURE — 82948 REAGENT STRIP/BLOOD GLUCOSE: CPT

## 2020-03-04 PROCEDURE — 85025 COMPLETE CBC W/AUTO DIFF WBC: CPT

## 2020-03-04 PROCEDURE — 36415 COLL VENOUS BLD VENIPUNCTURE: CPT

## 2020-03-04 PROCEDURE — 96374 THER/PROPH/DIAG INJ IV PUSH: CPT

## 2020-03-05 ENCOUNTER — HOSPITAL ENCOUNTER (OUTPATIENT)
Dept: HOSPITAL 90 - WHH | Age: 60
Discharge: HOME | End: 2020-03-05
Attending: FAMILY MEDICINE
Payer: COMMERCIAL

## 2020-03-05 VITALS — DIASTOLIC BLOOD PRESSURE: 82 MMHG | SYSTOLIC BLOOD PRESSURE: 144 MMHG

## 2020-03-05 VITALS — SYSTOLIC BLOOD PRESSURE: 106 MMHG | DIASTOLIC BLOOD PRESSURE: 62 MMHG

## 2020-03-05 DIAGNOSIS — N18.2: ICD-10-CM

## 2020-03-05 DIAGNOSIS — Z85.89: ICD-10-CM

## 2020-03-05 DIAGNOSIS — I25.2: ICD-10-CM

## 2020-03-05 DIAGNOSIS — E11.42: ICD-10-CM

## 2020-03-05 DIAGNOSIS — Z85.828: ICD-10-CM

## 2020-03-05 DIAGNOSIS — Z95.1: ICD-10-CM

## 2020-03-05 DIAGNOSIS — Z88.8: ICD-10-CM

## 2020-03-05 DIAGNOSIS — Z88.6: ICD-10-CM

## 2020-03-05 DIAGNOSIS — E11.21: ICD-10-CM

## 2020-03-05 DIAGNOSIS — Z88.5: ICD-10-CM

## 2020-03-05 DIAGNOSIS — E11.69: ICD-10-CM

## 2020-03-05 DIAGNOSIS — I25.10: ICD-10-CM

## 2020-03-05 DIAGNOSIS — E11.621: Primary | ICD-10-CM

## 2020-03-05 DIAGNOSIS — L97.521: ICD-10-CM

## 2020-03-05 DIAGNOSIS — Z79.82: ICD-10-CM

## 2020-03-05 DIAGNOSIS — M19.072: ICD-10-CM

## 2020-03-05 DIAGNOSIS — M86.672: ICD-10-CM

## 2020-03-05 DIAGNOSIS — E11.22: ICD-10-CM

## 2020-03-05 DIAGNOSIS — E11.319: ICD-10-CM

## 2020-03-05 DIAGNOSIS — K21.9: ICD-10-CM

## 2020-03-05 DIAGNOSIS — Z79.4: ICD-10-CM

## 2020-03-05 DIAGNOSIS — I12.9: ICD-10-CM

## 2020-03-05 DIAGNOSIS — E78.2: ICD-10-CM

## 2020-03-05 DIAGNOSIS — Z86.73: ICD-10-CM

## 2020-03-05 DIAGNOSIS — E11.51: ICD-10-CM

## 2020-03-05 DIAGNOSIS — Z79.899: ICD-10-CM

## 2020-03-05 PROCEDURE — 82948 REAGENT STRIP/BLOOD GLUCOSE: CPT

## 2020-03-06 ENCOUNTER — HOSPITAL ENCOUNTER (OUTPATIENT)
Dept: HOSPITAL 90 - WHH | Age: 60
Discharge: HOME | End: 2020-03-06
Attending: FAMILY MEDICINE
Payer: COMMERCIAL

## 2020-03-06 VITALS — DIASTOLIC BLOOD PRESSURE: 77 MMHG | SYSTOLIC BLOOD PRESSURE: 108 MMHG

## 2020-03-06 DIAGNOSIS — N18.2: ICD-10-CM

## 2020-03-06 DIAGNOSIS — Z88.6: ICD-10-CM

## 2020-03-06 DIAGNOSIS — Z86.73: ICD-10-CM

## 2020-03-06 DIAGNOSIS — Z88.8: ICD-10-CM

## 2020-03-06 DIAGNOSIS — E78.2: ICD-10-CM

## 2020-03-06 DIAGNOSIS — E11.51: ICD-10-CM

## 2020-03-06 DIAGNOSIS — Z85.828: ICD-10-CM

## 2020-03-06 DIAGNOSIS — Z79.82: ICD-10-CM

## 2020-03-06 DIAGNOSIS — E11.21: ICD-10-CM

## 2020-03-06 DIAGNOSIS — E11.69: ICD-10-CM

## 2020-03-06 DIAGNOSIS — Z88.5: ICD-10-CM

## 2020-03-06 DIAGNOSIS — Z95.1: ICD-10-CM

## 2020-03-06 DIAGNOSIS — M86.672: ICD-10-CM

## 2020-03-06 DIAGNOSIS — L97.521: ICD-10-CM

## 2020-03-06 DIAGNOSIS — E11.22: ICD-10-CM

## 2020-03-06 DIAGNOSIS — M19.072: ICD-10-CM

## 2020-03-06 DIAGNOSIS — Z79.899: ICD-10-CM

## 2020-03-06 DIAGNOSIS — E11.621: Primary | ICD-10-CM

## 2020-03-06 DIAGNOSIS — E11.319: ICD-10-CM

## 2020-03-06 DIAGNOSIS — Z85.89: ICD-10-CM

## 2020-03-06 DIAGNOSIS — I25.2: ICD-10-CM

## 2020-03-06 DIAGNOSIS — Z79.4: ICD-10-CM

## 2020-03-06 DIAGNOSIS — I25.10: ICD-10-CM

## 2020-03-06 DIAGNOSIS — I12.9: ICD-10-CM

## 2020-03-06 DIAGNOSIS — E11.42: ICD-10-CM

## 2020-03-06 DIAGNOSIS — K21.9: ICD-10-CM

## 2020-03-06 PROCEDURE — 82948 REAGENT STRIP/BLOOD GLUCOSE: CPT

## 2020-03-09 ENCOUNTER — HOSPITAL ENCOUNTER (OUTPATIENT)
Dept: HOSPITAL 90 - WHH | Age: 60
Discharge: HOME | End: 2020-03-09
Attending: FAMILY MEDICINE
Payer: COMMERCIAL

## 2020-03-09 VITALS — SYSTOLIC BLOOD PRESSURE: 132 MMHG | DIASTOLIC BLOOD PRESSURE: 82 MMHG

## 2020-03-09 VITALS — DIASTOLIC BLOOD PRESSURE: 82 MMHG | SYSTOLIC BLOOD PRESSURE: 146 MMHG

## 2020-03-09 DIAGNOSIS — N18.2: ICD-10-CM

## 2020-03-09 DIAGNOSIS — E11.621: Primary | ICD-10-CM

## 2020-03-09 DIAGNOSIS — I25.10: ICD-10-CM

## 2020-03-09 DIAGNOSIS — K21.9: ICD-10-CM

## 2020-03-09 DIAGNOSIS — E11.22: ICD-10-CM

## 2020-03-09 DIAGNOSIS — Z85.89: ICD-10-CM

## 2020-03-09 DIAGNOSIS — Z88.8: ICD-10-CM

## 2020-03-09 DIAGNOSIS — E11.319: ICD-10-CM

## 2020-03-09 DIAGNOSIS — Z85.828: ICD-10-CM

## 2020-03-09 DIAGNOSIS — M86.672: ICD-10-CM

## 2020-03-09 DIAGNOSIS — I25.2: ICD-10-CM

## 2020-03-09 DIAGNOSIS — Z79.82: ICD-10-CM

## 2020-03-09 DIAGNOSIS — E11.69: ICD-10-CM

## 2020-03-09 DIAGNOSIS — Z88.5: ICD-10-CM

## 2020-03-09 DIAGNOSIS — Z86.73: ICD-10-CM

## 2020-03-09 DIAGNOSIS — E11.51: ICD-10-CM

## 2020-03-09 DIAGNOSIS — L97.521: ICD-10-CM

## 2020-03-09 DIAGNOSIS — Z88.6: ICD-10-CM

## 2020-03-09 DIAGNOSIS — E78.2: ICD-10-CM

## 2020-03-09 DIAGNOSIS — Z79.4: ICD-10-CM

## 2020-03-09 DIAGNOSIS — Z95.1: ICD-10-CM

## 2020-03-09 DIAGNOSIS — M19.072: ICD-10-CM

## 2020-03-09 DIAGNOSIS — E11.42: ICD-10-CM

## 2020-03-09 DIAGNOSIS — Z79.899: ICD-10-CM

## 2020-03-09 DIAGNOSIS — E11.21: ICD-10-CM

## 2020-03-09 DIAGNOSIS — I12.9: ICD-10-CM

## 2020-03-09 PROCEDURE — 82948 REAGENT STRIP/BLOOD GLUCOSE: CPT

## 2020-03-10 ENCOUNTER — HOSPITAL ENCOUNTER (OUTPATIENT)
Dept: HOSPITAL 90 - WHH | Age: 60
Discharge: HOME | End: 2020-03-10
Attending: FAMILY MEDICINE
Payer: COMMERCIAL

## 2020-03-10 VITALS — SYSTOLIC BLOOD PRESSURE: 121 MMHG | DIASTOLIC BLOOD PRESSURE: 70 MMHG

## 2020-03-10 DIAGNOSIS — Z85.828: ICD-10-CM

## 2020-03-10 DIAGNOSIS — I12.9: ICD-10-CM

## 2020-03-10 DIAGNOSIS — Z88.8: ICD-10-CM

## 2020-03-10 DIAGNOSIS — E78.2: ICD-10-CM

## 2020-03-10 DIAGNOSIS — Z79.82: ICD-10-CM

## 2020-03-10 DIAGNOSIS — K21.9: ICD-10-CM

## 2020-03-10 DIAGNOSIS — I25.10: ICD-10-CM

## 2020-03-10 DIAGNOSIS — M86.672: ICD-10-CM

## 2020-03-10 DIAGNOSIS — Z79.899: ICD-10-CM

## 2020-03-10 DIAGNOSIS — E11.21: ICD-10-CM

## 2020-03-10 DIAGNOSIS — M19.072: ICD-10-CM

## 2020-03-10 DIAGNOSIS — E11.42: ICD-10-CM

## 2020-03-10 DIAGNOSIS — Z88.5: ICD-10-CM

## 2020-03-10 DIAGNOSIS — E11.319: ICD-10-CM

## 2020-03-10 DIAGNOSIS — Z95.1: ICD-10-CM

## 2020-03-10 DIAGNOSIS — Z88.6: ICD-10-CM

## 2020-03-10 DIAGNOSIS — Z85.89: ICD-10-CM

## 2020-03-10 DIAGNOSIS — Z86.73: ICD-10-CM

## 2020-03-10 DIAGNOSIS — E11.22: ICD-10-CM

## 2020-03-10 DIAGNOSIS — L97.521: ICD-10-CM

## 2020-03-10 DIAGNOSIS — N18.2: ICD-10-CM

## 2020-03-10 DIAGNOSIS — Z79.4: ICD-10-CM

## 2020-03-10 DIAGNOSIS — E11.51: ICD-10-CM

## 2020-03-10 DIAGNOSIS — E11.621: Primary | ICD-10-CM

## 2020-03-10 DIAGNOSIS — I25.2: ICD-10-CM

## 2020-03-10 DIAGNOSIS — E11.69: ICD-10-CM

## 2020-03-10 PROCEDURE — 82948 REAGENT STRIP/BLOOD GLUCOSE: CPT

## 2020-03-11 ENCOUNTER — HOSPITAL ENCOUNTER (OUTPATIENT)
Dept: HOSPITAL 90 - WHH | Age: 60
Discharge: HOME | End: 2020-03-11
Attending: PODIATRIST
Payer: COMMERCIAL

## 2020-03-11 VITALS — DIASTOLIC BLOOD PRESSURE: 57 MMHG | SYSTOLIC BLOOD PRESSURE: 102 MMHG

## 2020-03-11 VITALS — SYSTOLIC BLOOD PRESSURE: 132 MMHG | DIASTOLIC BLOOD PRESSURE: 70 MMHG

## 2020-03-11 DIAGNOSIS — I12.9: ICD-10-CM

## 2020-03-11 DIAGNOSIS — I25.10: ICD-10-CM

## 2020-03-11 DIAGNOSIS — Z88.8: ICD-10-CM

## 2020-03-11 DIAGNOSIS — M86.672: ICD-10-CM

## 2020-03-11 DIAGNOSIS — Z95.1: ICD-10-CM

## 2020-03-11 DIAGNOSIS — E78.2: ICD-10-CM

## 2020-03-11 DIAGNOSIS — E11.319: ICD-10-CM

## 2020-03-11 DIAGNOSIS — I25.2: ICD-10-CM

## 2020-03-11 DIAGNOSIS — M19.072: ICD-10-CM

## 2020-03-11 DIAGNOSIS — L97.521: ICD-10-CM

## 2020-03-11 DIAGNOSIS — Z85.828: ICD-10-CM

## 2020-03-11 DIAGNOSIS — Z79.4: ICD-10-CM

## 2020-03-11 DIAGNOSIS — Z88.5: ICD-10-CM

## 2020-03-11 DIAGNOSIS — E11.621: Primary | ICD-10-CM

## 2020-03-11 DIAGNOSIS — N18.2: ICD-10-CM

## 2020-03-11 DIAGNOSIS — Z85.89: ICD-10-CM

## 2020-03-11 DIAGNOSIS — E11.22: ICD-10-CM

## 2020-03-11 DIAGNOSIS — E11.21: ICD-10-CM

## 2020-03-11 DIAGNOSIS — Z79.82: ICD-10-CM

## 2020-03-11 DIAGNOSIS — Z88.6: ICD-10-CM

## 2020-03-11 DIAGNOSIS — E11.42: ICD-10-CM

## 2020-03-11 DIAGNOSIS — K21.9: ICD-10-CM

## 2020-03-11 DIAGNOSIS — Z86.73: ICD-10-CM

## 2020-03-11 DIAGNOSIS — E11.51: ICD-10-CM

## 2020-03-11 DIAGNOSIS — E11.69: ICD-10-CM

## 2020-03-11 PROCEDURE — 73630 X-RAY EXAM OF FOOT: CPT

## 2020-03-11 PROCEDURE — 82948 REAGENT STRIP/BLOOD GLUCOSE: CPT

## 2020-03-12 ENCOUNTER — HOSPITAL ENCOUNTER (OUTPATIENT)
Dept: HOSPITAL 90 - WHH | Age: 60
Discharge: HOME | End: 2020-03-12
Attending: FAMILY MEDICINE
Payer: COMMERCIAL

## 2020-03-12 VITALS — SYSTOLIC BLOOD PRESSURE: 118 MMHG | DIASTOLIC BLOOD PRESSURE: 70 MMHG

## 2020-03-12 DIAGNOSIS — E78.2: ICD-10-CM

## 2020-03-12 DIAGNOSIS — Z79.4: ICD-10-CM

## 2020-03-12 DIAGNOSIS — E11.319: ICD-10-CM

## 2020-03-12 DIAGNOSIS — E11.21: ICD-10-CM

## 2020-03-12 DIAGNOSIS — K21.9: ICD-10-CM

## 2020-03-12 DIAGNOSIS — E11.51: ICD-10-CM

## 2020-03-12 DIAGNOSIS — L97.521: ICD-10-CM

## 2020-03-12 DIAGNOSIS — Z86.73: ICD-10-CM

## 2020-03-12 DIAGNOSIS — I12.9: ICD-10-CM

## 2020-03-12 DIAGNOSIS — Z95.1: ICD-10-CM

## 2020-03-12 DIAGNOSIS — Z85.89: ICD-10-CM

## 2020-03-12 DIAGNOSIS — Z88.5: ICD-10-CM

## 2020-03-12 DIAGNOSIS — I25.10: ICD-10-CM

## 2020-03-12 DIAGNOSIS — E11.22: ICD-10-CM

## 2020-03-12 DIAGNOSIS — E11.42: ICD-10-CM

## 2020-03-12 DIAGNOSIS — Z88.8: ICD-10-CM

## 2020-03-12 DIAGNOSIS — M86.672: ICD-10-CM

## 2020-03-12 DIAGNOSIS — Z88.6: ICD-10-CM

## 2020-03-12 DIAGNOSIS — M19.072: ICD-10-CM

## 2020-03-12 DIAGNOSIS — Z85.828: ICD-10-CM

## 2020-03-12 DIAGNOSIS — E11.69: ICD-10-CM

## 2020-03-12 DIAGNOSIS — E11.621: Primary | ICD-10-CM

## 2020-03-12 DIAGNOSIS — Z79.899: ICD-10-CM

## 2020-03-12 DIAGNOSIS — Z79.82: ICD-10-CM

## 2020-03-12 DIAGNOSIS — I25.2: ICD-10-CM

## 2020-03-12 DIAGNOSIS — N18.9: ICD-10-CM

## 2020-03-12 PROCEDURE — 82948 REAGENT STRIP/BLOOD GLUCOSE: CPT

## 2020-03-13 ENCOUNTER — HOSPITAL ENCOUNTER (OUTPATIENT)
Dept: HOSPITAL 90 - WHH | Age: 60
Discharge: HOME | End: 2020-03-13
Attending: FAMILY MEDICINE
Payer: COMMERCIAL

## 2020-03-13 VITALS — DIASTOLIC BLOOD PRESSURE: 57 MMHG | SYSTOLIC BLOOD PRESSURE: 102 MMHG

## 2020-03-13 VITALS — SYSTOLIC BLOOD PRESSURE: 160 MMHG | DIASTOLIC BLOOD PRESSURE: 91 MMHG

## 2020-03-13 DIAGNOSIS — I25.10: ICD-10-CM

## 2020-03-13 DIAGNOSIS — Z88.5: ICD-10-CM

## 2020-03-13 DIAGNOSIS — Z95.1: ICD-10-CM

## 2020-03-13 DIAGNOSIS — K21.9: ICD-10-CM

## 2020-03-13 DIAGNOSIS — E11.621: Primary | ICD-10-CM

## 2020-03-13 DIAGNOSIS — E11.319: ICD-10-CM

## 2020-03-13 DIAGNOSIS — Z79.899: ICD-10-CM

## 2020-03-13 DIAGNOSIS — Z79.82: ICD-10-CM

## 2020-03-13 DIAGNOSIS — E11.51: ICD-10-CM

## 2020-03-13 DIAGNOSIS — I25.2: ICD-10-CM

## 2020-03-13 DIAGNOSIS — I12.9: ICD-10-CM

## 2020-03-13 DIAGNOSIS — Z85.828: ICD-10-CM

## 2020-03-13 DIAGNOSIS — Z79.4: ICD-10-CM

## 2020-03-13 DIAGNOSIS — M19.072: ICD-10-CM

## 2020-03-13 DIAGNOSIS — E11.22: ICD-10-CM

## 2020-03-13 DIAGNOSIS — Z85.89: ICD-10-CM

## 2020-03-13 DIAGNOSIS — Z88.8: ICD-10-CM

## 2020-03-13 DIAGNOSIS — E11.69: ICD-10-CM

## 2020-03-13 DIAGNOSIS — E11.21: ICD-10-CM

## 2020-03-13 DIAGNOSIS — N18.2: ICD-10-CM

## 2020-03-13 DIAGNOSIS — L97.521: ICD-10-CM

## 2020-03-13 DIAGNOSIS — Z88.6: ICD-10-CM

## 2020-03-13 DIAGNOSIS — E78.2: ICD-10-CM

## 2020-03-13 DIAGNOSIS — Z86.73: ICD-10-CM

## 2020-03-13 DIAGNOSIS — E11.42: ICD-10-CM

## 2020-03-13 DIAGNOSIS — M86.672: ICD-10-CM

## 2020-03-13 PROCEDURE — 82948 REAGENT STRIP/BLOOD GLUCOSE: CPT

## 2020-03-16 ENCOUNTER — HOSPITAL ENCOUNTER (OUTPATIENT)
Dept: HOSPITAL 90 - WHH | Age: 60
Discharge: HOME | End: 2020-03-16
Attending: FAMILY MEDICINE
Payer: COMMERCIAL

## 2020-03-16 VITALS — DIASTOLIC BLOOD PRESSURE: 68 MMHG | SYSTOLIC BLOOD PRESSURE: 106 MMHG

## 2020-03-16 DIAGNOSIS — E78.2: ICD-10-CM

## 2020-03-16 DIAGNOSIS — Z88.6: ICD-10-CM

## 2020-03-16 DIAGNOSIS — Z85.89: ICD-10-CM

## 2020-03-16 DIAGNOSIS — Z79.4: ICD-10-CM

## 2020-03-16 DIAGNOSIS — E11.621: Primary | ICD-10-CM

## 2020-03-16 DIAGNOSIS — M19.072: ICD-10-CM

## 2020-03-16 DIAGNOSIS — E11.22: ICD-10-CM

## 2020-03-16 DIAGNOSIS — E11.69: ICD-10-CM

## 2020-03-16 DIAGNOSIS — Z95.1: ICD-10-CM

## 2020-03-16 DIAGNOSIS — I25.10: ICD-10-CM

## 2020-03-16 DIAGNOSIS — I25.2: ICD-10-CM

## 2020-03-16 DIAGNOSIS — K21.9: ICD-10-CM

## 2020-03-16 DIAGNOSIS — E11.319: ICD-10-CM

## 2020-03-16 DIAGNOSIS — N18.2: ICD-10-CM

## 2020-03-16 DIAGNOSIS — M86.672: ICD-10-CM

## 2020-03-16 DIAGNOSIS — E11.51: ICD-10-CM

## 2020-03-16 DIAGNOSIS — Z86.73: ICD-10-CM

## 2020-03-16 DIAGNOSIS — L97.521: ICD-10-CM

## 2020-03-16 DIAGNOSIS — Z85.828: ICD-10-CM

## 2020-03-16 DIAGNOSIS — Z79.82: ICD-10-CM

## 2020-03-16 DIAGNOSIS — I12.9: ICD-10-CM

## 2020-03-16 DIAGNOSIS — E11.40: ICD-10-CM

## 2020-03-16 DIAGNOSIS — Z88.5: ICD-10-CM

## 2020-03-16 DIAGNOSIS — Z79.899: ICD-10-CM

## 2020-03-16 DIAGNOSIS — Z88.8: ICD-10-CM

## 2020-03-16 PROCEDURE — 82948 REAGENT STRIP/BLOOD GLUCOSE: CPT

## 2020-03-17 ENCOUNTER — HOSPITAL ENCOUNTER (OUTPATIENT)
Dept: HOSPITAL 90 - WHH | Age: 60
Discharge: HOME | End: 2020-03-17
Attending: FAMILY MEDICINE
Payer: COMMERCIAL

## 2020-03-17 VITALS — SYSTOLIC BLOOD PRESSURE: 145 MMHG | DIASTOLIC BLOOD PRESSURE: 80 MMHG

## 2020-03-17 DIAGNOSIS — Z79.899: ICD-10-CM

## 2020-03-17 DIAGNOSIS — E11.42: ICD-10-CM

## 2020-03-17 DIAGNOSIS — Z95.1: ICD-10-CM

## 2020-03-17 DIAGNOSIS — Z85.89: ICD-10-CM

## 2020-03-17 DIAGNOSIS — L97.521: ICD-10-CM

## 2020-03-17 DIAGNOSIS — Z79.4: ICD-10-CM

## 2020-03-17 DIAGNOSIS — M86.672: ICD-10-CM

## 2020-03-17 DIAGNOSIS — Z86.73: ICD-10-CM

## 2020-03-17 DIAGNOSIS — Z88.6: ICD-10-CM

## 2020-03-17 DIAGNOSIS — Z88.5: ICD-10-CM

## 2020-03-17 DIAGNOSIS — E11.69: ICD-10-CM

## 2020-03-17 DIAGNOSIS — E11.21: ICD-10-CM

## 2020-03-17 DIAGNOSIS — E11.40: ICD-10-CM

## 2020-03-17 DIAGNOSIS — Z79.82: ICD-10-CM

## 2020-03-17 DIAGNOSIS — I12.9: ICD-10-CM

## 2020-03-17 DIAGNOSIS — Z85.828: ICD-10-CM

## 2020-03-17 DIAGNOSIS — E78.5: ICD-10-CM

## 2020-03-17 DIAGNOSIS — E11.22: ICD-10-CM

## 2020-03-17 DIAGNOSIS — E11.319: ICD-10-CM

## 2020-03-17 DIAGNOSIS — I25.2: ICD-10-CM

## 2020-03-17 DIAGNOSIS — E11.621: Primary | ICD-10-CM

## 2020-03-17 DIAGNOSIS — K21.9: ICD-10-CM

## 2020-03-17 DIAGNOSIS — N18.2: ICD-10-CM

## 2020-03-17 DIAGNOSIS — M19.072: ICD-10-CM

## 2020-03-17 DIAGNOSIS — Z88.8: ICD-10-CM

## 2020-03-17 DIAGNOSIS — I25.10: ICD-10-CM

## 2020-03-17 DIAGNOSIS — E11.51: ICD-10-CM

## 2020-03-17 PROCEDURE — 82948 REAGENT STRIP/BLOOD GLUCOSE: CPT

## 2020-03-18 ENCOUNTER — HOSPITAL ENCOUNTER (OUTPATIENT)
Dept: HOSPITAL 90 - WHH | Age: 60
Discharge: HOME | End: 2020-03-18
Attending: PODIATRIST
Payer: COMMERCIAL

## 2020-03-18 VITALS — DIASTOLIC BLOOD PRESSURE: 68 MMHG | SYSTOLIC BLOOD PRESSURE: 121 MMHG

## 2020-03-18 DIAGNOSIS — K21.9: ICD-10-CM

## 2020-03-18 DIAGNOSIS — Z85.828: ICD-10-CM

## 2020-03-18 DIAGNOSIS — E11.21: ICD-10-CM

## 2020-03-18 DIAGNOSIS — E78.2: ICD-10-CM

## 2020-03-18 DIAGNOSIS — M86.672: ICD-10-CM

## 2020-03-18 DIAGNOSIS — Z79.4: ICD-10-CM

## 2020-03-18 DIAGNOSIS — I12.9: ICD-10-CM

## 2020-03-18 DIAGNOSIS — E11.51: ICD-10-CM

## 2020-03-18 DIAGNOSIS — I25.2: ICD-10-CM

## 2020-03-18 DIAGNOSIS — E11.22: ICD-10-CM

## 2020-03-18 DIAGNOSIS — Z86.73: ICD-10-CM

## 2020-03-18 DIAGNOSIS — E11.319: ICD-10-CM

## 2020-03-18 DIAGNOSIS — Z88.8: ICD-10-CM

## 2020-03-18 DIAGNOSIS — E11.42: ICD-10-CM

## 2020-03-18 DIAGNOSIS — Z85.89: ICD-10-CM

## 2020-03-18 DIAGNOSIS — Z88.5: ICD-10-CM

## 2020-03-18 DIAGNOSIS — M19.072: ICD-10-CM

## 2020-03-18 DIAGNOSIS — I25.10: ICD-10-CM

## 2020-03-18 DIAGNOSIS — N18.2: ICD-10-CM

## 2020-03-18 DIAGNOSIS — E11.40: ICD-10-CM

## 2020-03-18 DIAGNOSIS — Z95.1: ICD-10-CM

## 2020-03-18 DIAGNOSIS — Z79.82: ICD-10-CM

## 2020-03-18 DIAGNOSIS — E11.621: Primary | ICD-10-CM

## 2020-03-18 DIAGNOSIS — E11.69: ICD-10-CM

## 2020-03-18 DIAGNOSIS — Z88.6: ICD-10-CM

## 2020-03-18 DIAGNOSIS — Z79.899: ICD-10-CM

## 2020-03-18 DIAGNOSIS — L97.521: ICD-10-CM

## 2020-03-18 PROCEDURE — 82948 REAGENT STRIP/BLOOD GLUCOSE: CPT

## 2020-03-25 ENCOUNTER — HOSPITAL ENCOUNTER (OUTPATIENT)
Dept: HOSPITAL 90 - WHH | Age: 60
Discharge: HOME | End: 2020-03-25
Attending: PODIATRIST
Payer: COMMERCIAL

## 2020-03-25 VITALS — SYSTOLIC BLOOD PRESSURE: 151 MMHG | DIASTOLIC BLOOD PRESSURE: 94 MMHG

## 2020-03-25 DIAGNOSIS — E11.22: ICD-10-CM

## 2020-03-25 DIAGNOSIS — L97.521: ICD-10-CM

## 2020-03-25 DIAGNOSIS — Z79.82: ICD-10-CM

## 2020-03-25 DIAGNOSIS — Z79.4: ICD-10-CM

## 2020-03-25 DIAGNOSIS — L84: ICD-10-CM

## 2020-03-25 DIAGNOSIS — K21.9: ICD-10-CM

## 2020-03-25 DIAGNOSIS — I50.9: ICD-10-CM

## 2020-03-25 DIAGNOSIS — E11.621: Primary | ICD-10-CM

## 2020-03-25 DIAGNOSIS — M19.072: ICD-10-CM

## 2020-03-25 DIAGNOSIS — Z85.828: ICD-10-CM

## 2020-03-25 DIAGNOSIS — Z79.899: ICD-10-CM

## 2020-03-25 DIAGNOSIS — E78.2: ICD-10-CM

## 2020-03-25 DIAGNOSIS — Z88.8: ICD-10-CM

## 2020-03-25 DIAGNOSIS — I25.10: ICD-10-CM

## 2020-03-25 DIAGNOSIS — E11.51: ICD-10-CM

## 2020-03-25 DIAGNOSIS — N18.2: ICD-10-CM

## 2020-03-25 DIAGNOSIS — Z95.1: ICD-10-CM

## 2020-03-25 DIAGNOSIS — I25.2: ICD-10-CM

## 2020-03-25 DIAGNOSIS — Z88.5: ICD-10-CM

## 2020-03-25 DIAGNOSIS — Z85.89: ICD-10-CM

## 2020-03-25 DIAGNOSIS — E11.69: ICD-10-CM

## 2020-03-25 DIAGNOSIS — E11.21: ICD-10-CM

## 2020-03-25 DIAGNOSIS — Z86.73: ICD-10-CM

## 2020-03-25 DIAGNOSIS — Z88.6: ICD-10-CM

## 2020-03-25 DIAGNOSIS — I13.0: ICD-10-CM

## 2020-03-25 DIAGNOSIS — E11.319: ICD-10-CM

## 2020-03-25 DIAGNOSIS — M86.672: ICD-10-CM

## 2020-03-25 DIAGNOSIS — E11.42: ICD-10-CM

## 2020-03-25 PROCEDURE — 97597 DBRDMT OPN WND 1ST 20 CM/<: CPT

## 2020-04-14 ENCOUNTER — HOSPITAL ENCOUNTER (OUTPATIENT)
Dept: HOSPITAL 90 - RAH | Age: 60
Discharge: HOME | End: 2020-04-14
Attending: PODIATRIST
Payer: COMMERCIAL

## 2020-04-14 DIAGNOSIS — E11.621: Primary | ICD-10-CM

## 2020-04-14 DIAGNOSIS — M79.89: ICD-10-CM

## 2020-04-14 PROCEDURE — 73630 X-RAY EXAM OF FOOT: CPT

## 2020-04-15 ENCOUNTER — HOSPITAL ENCOUNTER (OUTPATIENT)
Dept: HOSPITAL 90 - WHH | Age: 60
Discharge: HOME | End: 2020-04-15
Attending: PODIATRIST
Payer: COMMERCIAL

## 2020-04-15 VITALS — SYSTOLIC BLOOD PRESSURE: 125 MMHG | DIASTOLIC BLOOD PRESSURE: 90 MMHG

## 2020-04-15 DIAGNOSIS — E78.2: ICD-10-CM

## 2020-04-15 DIAGNOSIS — E11.69: ICD-10-CM

## 2020-04-15 DIAGNOSIS — I25.10: ICD-10-CM

## 2020-04-15 DIAGNOSIS — I50.9: ICD-10-CM

## 2020-04-15 DIAGNOSIS — E11.22: ICD-10-CM

## 2020-04-15 DIAGNOSIS — Z79.4: ICD-10-CM

## 2020-04-15 DIAGNOSIS — N18.2: ICD-10-CM

## 2020-04-15 DIAGNOSIS — Z86.73: ICD-10-CM

## 2020-04-15 DIAGNOSIS — E11.621: Primary | ICD-10-CM

## 2020-04-15 DIAGNOSIS — Z79.899: ICD-10-CM

## 2020-04-15 DIAGNOSIS — Z88.5: ICD-10-CM

## 2020-04-15 DIAGNOSIS — L97.521: ICD-10-CM

## 2020-04-15 DIAGNOSIS — I13.0: ICD-10-CM

## 2020-04-15 DIAGNOSIS — M19.072: ICD-10-CM

## 2020-04-15 DIAGNOSIS — Z88.6: ICD-10-CM

## 2020-04-15 DIAGNOSIS — M86.672: ICD-10-CM

## 2020-04-15 DIAGNOSIS — E11.42: ICD-10-CM

## 2020-04-15 DIAGNOSIS — K21.9: ICD-10-CM

## 2020-04-15 DIAGNOSIS — Z88.8: ICD-10-CM

## 2020-04-15 DIAGNOSIS — I25.2: ICD-10-CM

## 2020-04-15 DIAGNOSIS — E11.21: ICD-10-CM

## 2020-04-15 DIAGNOSIS — Z79.82: ICD-10-CM

## 2020-04-15 DIAGNOSIS — E11.319: ICD-10-CM

## 2020-04-15 DIAGNOSIS — E11.51: ICD-10-CM

## 2020-04-15 PROCEDURE — 99214 OFFICE O/P EST MOD 30 MIN: CPT

## 2020-05-13 ENCOUNTER — HOSPITAL ENCOUNTER (OUTPATIENT)
Dept: HOSPITAL 90 - WHH | Age: 60
Discharge: HOME | End: 2020-05-13
Attending: PODIATRIST
Payer: COMMERCIAL

## 2020-05-13 VITALS — DIASTOLIC BLOOD PRESSURE: 86 MMHG | SYSTOLIC BLOOD PRESSURE: 141 MMHG

## 2020-05-13 DIAGNOSIS — L97.521: ICD-10-CM

## 2020-05-13 DIAGNOSIS — E11.22: ICD-10-CM

## 2020-05-13 DIAGNOSIS — M86.672: ICD-10-CM

## 2020-05-13 DIAGNOSIS — E11.51: ICD-10-CM

## 2020-05-13 DIAGNOSIS — I25.2: ICD-10-CM

## 2020-05-13 DIAGNOSIS — I50.9: ICD-10-CM

## 2020-05-13 DIAGNOSIS — E11.621: Primary | ICD-10-CM

## 2020-05-13 DIAGNOSIS — E78.2: ICD-10-CM

## 2020-05-13 DIAGNOSIS — N18.2: ICD-10-CM

## 2020-05-13 DIAGNOSIS — I13.0: ICD-10-CM

## 2020-05-13 DIAGNOSIS — Z88.6: ICD-10-CM

## 2020-05-13 DIAGNOSIS — Z88.8: ICD-10-CM

## 2020-05-13 DIAGNOSIS — E11.42: ICD-10-CM

## 2020-05-13 DIAGNOSIS — I25.10: ICD-10-CM

## 2020-05-13 DIAGNOSIS — K21.9: ICD-10-CM

## 2020-05-13 DIAGNOSIS — Z79.82: ICD-10-CM

## 2020-05-13 DIAGNOSIS — E11.21: ICD-10-CM

## 2020-05-13 DIAGNOSIS — E11.319: ICD-10-CM

## 2020-05-13 DIAGNOSIS — Z88.5: ICD-10-CM

## 2020-05-13 DIAGNOSIS — E11.69: ICD-10-CM

## 2020-05-13 DIAGNOSIS — Z79.899: ICD-10-CM

## 2020-05-13 DIAGNOSIS — M19.072: ICD-10-CM

## 2020-05-13 DIAGNOSIS — Z79.4: ICD-10-CM

## 2020-05-13 DIAGNOSIS — Z86.73: ICD-10-CM

## 2020-05-13 PROCEDURE — 97602 WOUND(S) CARE NON-SELECTIVE: CPT

## 2020-05-13 PROCEDURE — 97597 DBRDMT OPN WND 1ST 20 CM/<: CPT

## 2020-05-13 PROCEDURE — 73630 X-RAY EXAM OF FOOT: CPT

## 2020-05-13 NOTE — NUR
Correction -- remove selective and excisional documentation -- Non selective debridement 
performed and documented by MD.

## 2020-05-20 ENCOUNTER — HOSPITAL ENCOUNTER (OUTPATIENT)
Dept: HOSPITAL 90 - WHH | Age: 60
Discharge: HOME | End: 2020-05-20
Attending: PODIATRIST
Payer: COMMERCIAL

## 2020-05-20 VITALS — SYSTOLIC BLOOD PRESSURE: 124 MMHG | DIASTOLIC BLOOD PRESSURE: 78 MMHG

## 2020-05-20 DIAGNOSIS — E11.21: ICD-10-CM

## 2020-05-20 DIAGNOSIS — I25.10: ICD-10-CM

## 2020-05-20 DIAGNOSIS — M86.672: ICD-10-CM

## 2020-05-20 DIAGNOSIS — N18.2: ICD-10-CM

## 2020-05-20 DIAGNOSIS — E11.69: ICD-10-CM

## 2020-05-20 DIAGNOSIS — L97.521: ICD-10-CM

## 2020-05-20 DIAGNOSIS — E11.42: ICD-10-CM

## 2020-05-20 DIAGNOSIS — I13.0: ICD-10-CM

## 2020-05-20 DIAGNOSIS — E11.621: Primary | ICD-10-CM

## 2020-05-20 DIAGNOSIS — M19.072: ICD-10-CM

## 2020-05-20 DIAGNOSIS — Z88.6: ICD-10-CM

## 2020-05-20 DIAGNOSIS — K21.9: ICD-10-CM

## 2020-05-20 DIAGNOSIS — E78.2: ICD-10-CM

## 2020-05-20 DIAGNOSIS — E11.22: ICD-10-CM

## 2020-05-20 DIAGNOSIS — E11.51: ICD-10-CM

## 2020-05-20 DIAGNOSIS — I50.9: ICD-10-CM

## 2020-05-20 DIAGNOSIS — Z79.4: ICD-10-CM

## 2020-05-20 DIAGNOSIS — Z79.82: ICD-10-CM

## 2020-05-20 DIAGNOSIS — Z86.73: ICD-10-CM

## 2020-05-20 DIAGNOSIS — E11.319: ICD-10-CM

## 2020-05-20 DIAGNOSIS — Z88.5: ICD-10-CM

## 2020-05-20 DIAGNOSIS — Z79.899: ICD-10-CM

## 2020-05-20 DIAGNOSIS — Z88.8: ICD-10-CM

## 2020-05-20 PROCEDURE — 99213 OFFICE O/P EST LOW 20 MIN: CPT

## 2020-05-31 ENCOUNTER — HOSPITAL ENCOUNTER (EMERGENCY)
Dept: HOSPITAL 90 - EDH | Age: 60
Discharge: HOME | End: 2020-05-31
Payer: COMMERCIAL

## 2020-05-31 DIAGNOSIS — L03.116: Primary | ICD-10-CM

## 2020-05-31 DIAGNOSIS — Z88.6: ICD-10-CM

## 2020-05-31 DIAGNOSIS — E78.5: ICD-10-CM

## 2020-05-31 DIAGNOSIS — E11.9: ICD-10-CM

## 2020-05-31 DIAGNOSIS — I10: ICD-10-CM

## 2020-05-31 LAB
BASOPHILS NFR BLD AUTO: 0.5 % (ref 0–5)
BUN SERPL-MCNC: 25 MG/DL (ref 7–18)
CHLORIDE SERPL-SCNC: 97 MMOL/L (ref 101–111)
CO2 SERPL-SCNC: 28 MMOL/L (ref 21–32)
CREAT SERPL-MCNC: 1.3 MG/DL (ref 0.5–1.5)
EOSINOPHIL NFR BLD AUTO: 0.9 % (ref 0–8)
ERYTHROCYTE [DISTWIDTH] IN BLOOD BY AUTOMATED COUNT: 13.2 % (ref 11–15.5)
GFR SERPL CREATININE-BSD FRML MDRD: 60 ML/MIN (ref 60–?)
GLUCOSE SERPL-MCNC: 276 MG/DL (ref 70–105)
HCT VFR BLD AUTO: 46.6 % (ref 42–54)
LYMPHOCYTES NFR SPEC AUTO: 33.5 % (ref 21–51)
MCH RBC QN AUTO: 33.3 PG (ref 27–33)
MCHC RBC AUTO-ENTMCNC: 34.3 G/DL (ref 32–36)
MCV RBC AUTO: 97.1 FL (ref 79–99)
MONOCYTES NFR BLD AUTO: 10.4 % (ref 3–13)
NEUTROPHILS NFR BLD AUTO: 54.5 % (ref 40–77)
NRBC BLD MANUAL-RTO: 0 % (ref 0–0.19)
PLATELET # BLD AUTO: 114 K/UL (ref 130–400)
POTASSIUM SERPL-SCNC: 4.3 MMOL/L (ref 3.5–5.1)
RBC # BLD AUTO: 4.8 MIL/UL (ref 4.5–6.2)
SODIUM SERPL-SCNC: 132 MMOL/L (ref 136–145)
WBC # BLD AUTO: 6.4 K/UL (ref 4.8–10.8)

## 2020-05-31 PROCEDURE — 87040 BLOOD CULTURE FOR BACTERIA: CPT

## 2020-05-31 PROCEDURE — 80048 BASIC METABOLIC PNL TOTAL CA: CPT

## 2020-05-31 PROCEDURE — 36415 COLL VENOUS BLD VENIPUNCTURE: CPT

## 2020-05-31 PROCEDURE — 85025 COMPLETE CBC W/AUTO DIFF WBC: CPT

## 2020-06-03 ENCOUNTER — HOSPITAL ENCOUNTER (OUTPATIENT)
Dept: HOSPITAL 90 - WHH | Age: 60
Discharge: HOME | End: 2020-06-03
Attending: PODIATRIST
Payer: COMMERCIAL

## 2020-06-03 VITALS — SYSTOLIC BLOOD PRESSURE: 164 MMHG | DIASTOLIC BLOOD PRESSURE: 106 MMHG

## 2020-06-03 DIAGNOSIS — M86.672: ICD-10-CM

## 2020-06-03 DIAGNOSIS — Z79.4: ICD-10-CM

## 2020-06-03 DIAGNOSIS — I25.10: ICD-10-CM

## 2020-06-03 DIAGNOSIS — N18.2: ICD-10-CM

## 2020-06-03 DIAGNOSIS — L97.521: Primary | ICD-10-CM

## 2020-06-03 DIAGNOSIS — E11.69: ICD-10-CM

## 2020-06-03 DIAGNOSIS — M19.072: ICD-10-CM

## 2020-06-03 DIAGNOSIS — E11.51: ICD-10-CM

## 2020-06-03 DIAGNOSIS — E11.42: ICD-10-CM

## 2020-06-03 DIAGNOSIS — I13.0: ICD-10-CM

## 2020-06-03 DIAGNOSIS — E11.21: ICD-10-CM

## 2020-06-03 DIAGNOSIS — I50.9: ICD-10-CM

## 2020-06-03 DIAGNOSIS — K21.9: ICD-10-CM

## 2020-06-03 DIAGNOSIS — Z88.8: ICD-10-CM

## 2020-06-03 DIAGNOSIS — E11.22: ICD-10-CM

## 2020-06-03 DIAGNOSIS — E78.2: ICD-10-CM

## 2020-06-03 DIAGNOSIS — E11.319: ICD-10-CM

## 2020-06-03 DIAGNOSIS — Z88.6: ICD-10-CM

## 2020-06-03 DIAGNOSIS — Z88.5: ICD-10-CM

## 2020-06-03 PROCEDURE — 99214 OFFICE O/P EST MOD 30 MIN: CPT

## 2020-07-01 ENCOUNTER — HOSPITAL ENCOUNTER (OUTPATIENT)
Dept: HOSPITAL 90 - WHH | Age: 60
Discharge: HOME | End: 2020-07-01
Attending: PODIATRIST
Payer: COMMERCIAL

## 2020-07-01 VITALS — DIASTOLIC BLOOD PRESSURE: 66 MMHG | SYSTOLIC BLOOD PRESSURE: 135 MMHG

## 2020-07-01 DIAGNOSIS — Z88.6: ICD-10-CM

## 2020-07-01 DIAGNOSIS — L97.521: ICD-10-CM

## 2020-07-01 DIAGNOSIS — I25.10: ICD-10-CM

## 2020-07-01 DIAGNOSIS — Z79.4: ICD-10-CM

## 2020-07-01 DIAGNOSIS — Z88.5: ICD-10-CM

## 2020-07-01 DIAGNOSIS — E11.42: ICD-10-CM

## 2020-07-01 DIAGNOSIS — E78.2: ICD-10-CM

## 2020-07-01 DIAGNOSIS — I50.9: ICD-10-CM

## 2020-07-01 DIAGNOSIS — Z88.8: ICD-10-CM

## 2020-07-01 DIAGNOSIS — M19.072: ICD-10-CM

## 2020-07-01 DIAGNOSIS — E78.5: ICD-10-CM

## 2020-07-01 DIAGNOSIS — I13.0: ICD-10-CM

## 2020-07-01 DIAGNOSIS — E11.621: Primary | ICD-10-CM

## 2020-07-01 DIAGNOSIS — N18.2: ICD-10-CM

## 2020-07-01 DIAGNOSIS — K21.9: ICD-10-CM

## 2020-07-01 DIAGNOSIS — E11.69: ICD-10-CM

## 2020-07-01 DIAGNOSIS — E11.319: ICD-10-CM

## 2020-07-01 DIAGNOSIS — E11.51: ICD-10-CM

## 2020-07-01 DIAGNOSIS — M86.672: ICD-10-CM

## 2020-07-01 DIAGNOSIS — E11.22: ICD-10-CM

## 2020-07-01 PROCEDURE — 97597 DBRDMT OPN WND 1ST 20 CM/<: CPT

## 2020-07-15 ENCOUNTER — HOSPITAL ENCOUNTER (OUTPATIENT)
Dept: HOSPITAL 90 - WHH | Age: 60
Discharge: HOME | End: 2020-07-15
Attending: PODIATRIST
Payer: COMMERCIAL

## 2020-07-15 DIAGNOSIS — M86.672: ICD-10-CM

## 2020-07-15 DIAGNOSIS — E11.319: ICD-10-CM

## 2020-07-15 DIAGNOSIS — E78.5: ICD-10-CM

## 2020-07-15 DIAGNOSIS — I50.9: ICD-10-CM

## 2020-07-15 DIAGNOSIS — I25.10: ICD-10-CM

## 2020-07-15 DIAGNOSIS — I13.0: ICD-10-CM

## 2020-07-15 DIAGNOSIS — E11.621: Primary | ICD-10-CM

## 2020-07-15 DIAGNOSIS — Z88.6: ICD-10-CM

## 2020-07-15 DIAGNOSIS — L84: ICD-10-CM

## 2020-07-15 DIAGNOSIS — Z88.5: ICD-10-CM

## 2020-07-15 DIAGNOSIS — Z79.4: ICD-10-CM

## 2020-07-15 DIAGNOSIS — E11.51: ICD-10-CM

## 2020-07-15 DIAGNOSIS — K21.9: ICD-10-CM

## 2020-07-15 DIAGNOSIS — M86.30: ICD-10-CM

## 2020-07-15 DIAGNOSIS — N18.2: ICD-10-CM

## 2020-07-15 DIAGNOSIS — Z88.8: ICD-10-CM

## 2020-07-15 DIAGNOSIS — E11.42: ICD-10-CM

## 2020-07-15 DIAGNOSIS — L97.521: ICD-10-CM

## 2020-07-15 DIAGNOSIS — M19.072: ICD-10-CM

## 2020-07-15 DIAGNOSIS — E11.22: ICD-10-CM

## 2020-07-15 DIAGNOSIS — E11.69: ICD-10-CM

## 2020-07-15 PROCEDURE — 11055 PARING/CUTG B9 HYPRKER LES 1: CPT

## 2020-07-29 ENCOUNTER — HOSPITAL ENCOUNTER (OUTPATIENT)
Dept: HOSPITAL 90 - WHH | Age: 60
Discharge: HOME | End: 2020-07-29
Attending: PODIATRIST
Payer: COMMERCIAL

## 2020-07-29 DIAGNOSIS — E11.621: Primary | ICD-10-CM

## 2020-07-29 DIAGNOSIS — Z88.5: ICD-10-CM

## 2020-07-29 DIAGNOSIS — E11.22: ICD-10-CM

## 2020-07-29 DIAGNOSIS — I25.10: ICD-10-CM

## 2020-07-29 DIAGNOSIS — E11.69: ICD-10-CM

## 2020-07-29 DIAGNOSIS — L97.521: ICD-10-CM

## 2020-07-29 DIAGNOSIS — K21.9: ICD-10-CM

## 2020-07-29 DIAGNOSIS — E11.319: ICD-10-CM

## 2020-07-29 DIAGNOSIS — I50.9: ICD-10-CM

## 2020-07-29 DIAGNOSIS — M19.072: ICD-10-CM

## 2020-07-29 DIAGNOSIS — M86.30: ICD-10-CM

## 2020-07-29 DIAGNOSIS — M86.672: ICD-10-CM

## 2020-07-29 DIAGNOSIS — I13.0: ICD-10-CM

## 2020-07-29 DIAGNOSIS — E11.51: ICD-10-CM

## 2020-07-29 DIAGNOSIS — L84: ICD-10-CM

## 2020-07-29 DIAGNOSIS — N18.2: ICD-10-CM

## 2020-07-29 DIAGNOSIS — E78.5: ICD-10-CM

## 2020-07-29 DIAGNOSIS — Z88.6: ICD-10-CM

## 2020-07-29 DIAGNOSIS — E11.42: ICD-10-CM

## 2020-07-29 PROCEDURE — 99214 OFFICE O/P EST MOD 30 MIN: CPT

## 2020-08-12 ENCOUNTER — HOSPITAL ENCOUNTER (OUTPATIENT)
Dept: HOSPITAL 90 - WHH | Age: 60
Discharge: HOME | End: 2020-08-12
Attending: PODIATRIST
Payer: COMMERCIAL

## 2020-08-12 DIAGNOSIS — E11.51: ICD-10-CM

## 2020-08-12 DIAGNOSIS — E11.319: ICD-10-CM

## 2020-08-12 DIAGNOSIS — E78.5: ICD-10-CM

## 2020-08-12 DIAGNOSIS — E11.42: ICD-10-CM

## 2020-08-12 DIAGNOSIS — M19.072: ICD-10-CM

## 2020-08-12 DIAGNOSIS — E11.69: ICD-10-CM

## 2020-08-12 DIAGNOSIS — L84: ICD-10-CM

## 2020-08-12 DIAGNOSIS — K21.9: ICD-10-CM

## 2020-08-12 DIAGNOSIS — I50.9: ICD-10-CM

## 2020-08-12 DIAGNOSIS — M86.672: ICD-10-CM

## 2020-08-12 DIAGNOSIS — I25.10: ICD-10-CM

## 2020-08-12 DIAGNOSIS — Z88.5: ICD-10-CM

## 2020-08-12 DIAGNOSIS — I13.0: ICD-10-CM

## 2020-08-12 DIAGNOSIS — M86.30: ICD-10-CM

## 2020-08-12 DIAGNOSIS — E11.621: Primary | ICD-10-CM

## 2020-08-12 DIAGNOSIS — L97.521: ICD-10-CM

## 2020-08-12 DIAGNOSIS — Z88.6: ICD-10-CM

## 2020-08-12 DIAGNOSIS — E11.22: ICD-10-CM

## 2020-08-12 DIAGNOSIS — L57.0: ICD-10-CM

## 2020-08-12 DIAGNOSIS — N18.2: ICD-10-CM

## 2020-08-12 PROCEDURE — 11055 PARING/CUTG B9 HYPRKER LES 1: CPT

## 2020-08-12 PROCEDURE — 99214 OFFICE O/P EST MOD 30 MIN: CPT

## 2020-09-08 ENCOUNTER — HOSPITAL ENCOUNTER (OUTPATIENT)
Dept: HOSPITAL 90 - RAH | Age: 60
Discharge: HOME | End: 2020-09-08
Attending: PODIATRIST
Payer: COMMERCIAL

## 2020-09-08 DIAGNOSIS — M86.8X7: Primary | ICD-10-CM

## 2020-09-08 PROCEDURE — 73630 X-RAY EXAM OF FOOT: CPT

## 2020-09-09 ENCOUNTER — HOSPITAL ENCOUNTER (OUTPATIENT)
Dept: HOSPITAL 90 - WHH | Age: 60
Discharge: HOME | End: 2020-09-09
Attending: PODIATRIST
Payer: COMMERCIAL

## 2020-09-09 DIAGNOSIS — E11.319: ICD-10-CM

## 2020-09-09 DIAGNOSIS — E11.51: ICD-10-CM

## 2020-09-09 DIAGNOSIS — I50.9: ICD-10-CM

## 2020-09-09 DIAGNOSIS — E11.621: Primary | ICD-10-CM

## 2020-09-09 DIAGNOSIS — K21.9: ICD-10-CM

## 2020-09-09 DIAGNOSIS — I25.10: ICD-10-CM

## 2020-09-09 DIAGNOSIS — E11.69: ICD-10-CM

## 2020-09-09 DIAGNOSIS — L57.0: ICD-10-CM

## 2020-09-09 DIAGNOSIS — M19.072: ICD-10-CM

## 2020-09-09 DIAGNOSIS — E11.22: ICD-10-CM

## 2020-09-09 DIAGNOSIS — I13.0: ICD-10-CM

## 2020-09-09 DIAGNOSIS — Z88.6: ICD-10-CM

## 2020-09-09 DIAGNOSIS — N18.2: ICD-10-CM

## 2020-09-09 DIAGNOSIS — M86.30: ICD-10-CM

## 2020-09-09 DIAGNOSIS — E78.5: ICD-10-CM

## 2020-09-09 DIAGNOSIS — E11.42: ICD-10-CM

## 2020-09-09 DIAGNOSIS — Z88.5: ICD-10-CM

## 2020-09-09 DIAGNOSIS — L97.521: ICD-10-CM

## 2020-09-09 DIAGNOSIS — M86.672: ICD-10-CM

## 2020-09-09 PROCEDURE — 99214 OFFICE O/P EST MOD 30 MIN: CPT

## 2020-09-30 ENCOUNTER — HOSPITAL ENCOUNTER (OUTPATIENT)
Dept: HOSPITAL 90 - WHH | Age: 60
Discharge: HOME | End: 2020-09-30
Attending: PODIATRIST
Payer: COMMERCIAL

## 2020-09-30 DIAGNOSIS — E78.5: ICD-10-CM

## 2020-09-30 DIAGNOSIS — I13.0: ICD-10-CM

## 2020-09-30 DIAGNOSIS — E11.319: ICD-10-CM

## 2020-09-30 DIAGNOSIS — M19.072: ICD-10-CM

## 2020-09-30 DIAGNOSIS — L84: ICD-10-CM

## 2020-09-30 DIAGNOSIS — E11.51: ICD-10-CM

## 2020-09-30 DIAGNOSIS — E11.69: ICD-10-CM

## 2020-09-30 DIAGNOSIS — L57.0: ICD-10-CM

## 2020-09-30 DIAGNOSIS — N18.2: ICD-10-CM

## 2020-09-30 DIAGNOSIS — E11.621: Primary | ICD-10-CM

## 2020-09-30 DIAGNOSIS — M86.30: ICD-10-CM

## 2020-09-30 DIAGNOSIS — E11.42: ICD-10-CM

## 2020-09-30 DIAGNOSIS — Z88.5: ICD-10-CM

## 2020-09-30 DIAGNOSIS — I50.9: ICD-10-CM

## 2020-09-30 DIAGNOSIS — M86.672: ICD-10-CM

## 2020-09-30 DIAGNOSIS — Z88.6: ICD-10-CM

## 2020-09-30 DIAGNOSIS — K21.9: ICD-10-CM

## 2020-09-30 DIAGNOSIS — E11.22: ICD-10-CM

## 2020-09-30 DIAGNOSIS — I25.10: ICD-10-CM

## 2020-09-30 DIAGNOSIS — L97.521: ICD-10-CM

## 2020-09-30 PROCEDURE — 11055 PARING/CUTG B9 HYPRKER LES 1: CPT

## 2020-11-18 ENCOUNTER — HOSPITAL ENCOUNTER (OUTPATIENT)
Dept: HOSPITAL 90 - WHH | Age: 60
Discharge: HOME | End: 2020-11-18
Attending: PODIATRIST
Payer: COMMERCIAL

## 2020-11-18 ENCOUNTER — HOSPITAL ENCOUNTER (INPATIENT)
Dept: HOSPITAL 90 - EDH | Age: 60
LOS: 11 days | Discharge: HOME | DRG: 271 | End: 2020-11-29
Attending: INTERNAL MEDICINE | Admitting: INTERNAL MEDICINE
Payer: COMMERCIAL

## 2020-11-18 VITALS — DIASTOLIC BLOOD PRESSURE: 57 MMHG | SYSTOLIC BLOOD PRESSURE: 96 MMHG

## 2020-11-18 VITALS — DIASTOLIC BLOOD PRESSURE: 79 MMHG | SYSTOLIC BLOOD PRESSURE: 150 MMHG

## 2020-11-18 VITALS — WEIGHT: 185 LBS | BODY MASS INDEX: 36.32 KG/M2 | HEIGHT: 60 IN

## 2020-11-18 VITALS — HEIGHT: 68 IN | BODY MASS INDEX: 28.79 KG/M2 | WEIGHT: 190 LBS

## 2020-11-18 DIAGNOSIS — Z98.49: ICD-10-CM

## 2020-11-18 DIAGNOSIS — E78.5: ICD-10-CM

## 2020-11-18 DIAGNOSIS — L03.032: ICD-10-CM

## 2020-11-18 DIAGNOSIS — E11.319: ICD-10-CM

## 2020-11-18 DIAGNOSIS — I96: ICD-10-CM

## 2020-11-18 DIAGNOSIS — Z95.820: ICD-10-CM

## 2020-11-18 DIAGNOSIS — M86.8X7: ICD-10-CM

## 2020-11-18 DIAGNOSIS — I35.0: ICD-10-CM

## 2020-11-18 DIAGNOSIS — M19.072: ICD-10-CM

## 2020-11-18 DIAGNOSIS — Z79.82: ICD-10-CM

## 2020-11-18 DIAGNOSIS — N18.2: ICD-10-CM

## 2020-11-18 DIAGNOSIS — L97.521: ICD-10-CM

## 2020-11-18 DIAGNOSIS — Z88.6: ICD-10-CM

## 2020-11-18 DIAGNOSIS — E11.52: ICD-10-CM

## 2020-11-18 DIAGNOSIS — I25.10: ICD-10-CM

## 2020-11-18 DIAGNOSIS — Z88.5: ICD-10-CM

## 2020-11-18 DIAGNOSIS — Z93.1: ICD-10-CM

## 2020-11-18 DIAGNOSIS — Z85.830: ICD-10-CM

## 2020-11-18 DIAGNOSIS — E11.42: ICD-10-CM

## 2020-11-18 DIAGNOSIS — E11.69: ICD-10-CM

## 2020-11-18 DIAGNOSIS — L84: ICD-10-CM

## 2020-11-18 DIAGNOSIS — L03.116: ICD-10-CM

## 2020-11-18 DIAGNOSIS — Z83.3: ICD-10-CM

## 2020-11-18 DIAGNOSIS — E11.621: ICD-10-CM

## 2020-11-18 DIAGNOSIS — E11.65: ICD-10-CM

## 2020-11-18 DIAGNOSIS — N17.9: ICD-10-CM

## 2020-11-18 DIAGNOSIS — L57.0: ICD-10-CM

## 2020-11-18 DIAGNOSIS — K21.9: ICD-10-CM

## 2020-11-18 DIAGNOSIS — E11.621: Primary | ICD-10-CM

## 2020-11-18 DIAGNOSIS — I12.9: ICD-10-CM

## 2020-11-18 DIAGNOSIS — E11.22: ICD-10-CM

## 2020-11-18 DIAGNOSIS — Z79.4: ICD-10-CM

## 2020-11-18 DIAGNOSIS — I50.9: ICD-10-CM

## 2020-11-18 DIAGNOSIS — M86.30: ICD-10-CM

## 2020-11-18 DIAGNOSIS — M86.672: ICD-10-CM

## 2020-11-18 DIAGNOSIS — E11.52: Primary | ICD-10-CM

## 2020-11-18 DIAGNOSIS — E66.9: ICD-10-CM

## 2020-11-18 DIAGNOSIS — N18.30: ICD-10-CM

## 2020-11-18 DIAGNOSIS — Z95.1: ICD-10-CM

## 2020-11-18 DIAGNOSIS — Z80.8: ICD-10-CM

## 2020-11-18 DIAGNOSIS — Z79.02: ICD-10-CM

## 2020-11-18 DIAGNOSIS — I13.0: ICD-10-CM

## 2020-11-18 DIAGNOSIS — Z79.899: ICD-10-CM

## 2020-11-18 DIAGNOSIS — L97.529: ICD-10-CM

## 2020-11-18 DIAGNOSIS — Z88.8: ICD-10-CM

## 2020-11-18 DIAGNOSIS — Z82.49: ICD-10-CM

## 2020-11-18 LAB
ALBUMIN SERPL-MCNC: 3.8 G/DL (ref 3.5–5)
ALT SERPL-CCNC: 23 U/L (ref 12–78)
AST SERPL-CCNC: 16 U/L (ref 10–37)
BASOPHILS NFR BLD AUTO: 0.3 % (ref 0–5)
BILIRUB SERPL-MCNC: 0.8 MG/DL (ref 0.2–1)
BUN SERPL-MCNC: 26 MG/DL (ref 7–18)
CHLORIDE SERPL-SCNC: 97 MMOL/L (ref 101–111)
CO2 SERPL-SCNC: 28 MMOL/L (ref 21–32)
CREAT SERPL-MCNC: 1.5 MG/DL (ref 0.5–1.5)
EOSINOPHIL NFR BLD AUTO: 1 % (ref 0–8)
ERYTHROCYTE [DISTWIDTH] IN BLOOD BY AUTOMATED COUNT: 13.1 % (ref 11–15.5)
GFR SERPL CREATININE-BSD FRML MDRD: 51 ML/MIN (ref 60–?)
GLUCOSE SERPL-MCNC: 341 MG/DL (ref 70–105)
HCT VFR BLD AUTO: 44.4 % (ref 42–54)
LYMPHOCYTES NFR SPEC AUTO: 18.5 % (ref 21–51)
MCH RBC QN AUTO: 33.6 PG (ref 27–33)
MCHC RBC AUTO-ENTMCNC: 34.7 G/DL (ref 32–36)
MCV RBC AUTO: 96.7 FL (ref 79–99)
MONOCYTES NFR BLD AUTO: 9.7 % (ref 3–13)
NEUTROPHILS NFR BLD AUTO: 70.1 % (ref 40–77)
NRBC BLD MANUAL-RTO: 0 % (ref 0–0.19)
PLATELET # BLD AUTO: 143 K/UL (ref 130–400)
POTASSIUM SERPL-SCNC: 4.5 MMOL/L (ref 3.5–5.1)
PROT SERPL-MCNC: 8.2 G/DL (ref 6–8.3)
RBC # BLD AUTO: 4.59 MIL/UL (ref 4.5–6.2)
SODIUM SERPL-SCNC: 133 MMOL/L (ref 136–145)
WBC # BLD AUTO: 7.8 K/UL (ref 4.8–10.8)

## 2020-11-18 PROCEDURE — 85025 COMPLETE CBC W/AUTO DIFF WBC: CPT

## 2020-11-18 PROCEDURE — 37228: CPT

## 2020-11-18 PROCEDURE — 36247 INS CATH ABD/L-EXT ART 3RD: CPT

## 2020-11-18 PROCEDURE — 73718 MRI LOWER EXTREMITY W/O DYE: CPT

## 2020-11-18 PROCEDURE — 99214 OFFICE O/P EST MOD 30 MIN: CPT

## 2020-11-18 PROCEDURE — 76770 US EXAM ABDO BACK WALL COMP: CPT

## 2020-11-18 PROCEDURE — 80053 COMPREHEN METABOLIC PANEL: CPT

## 2020-11-18 PROCEDURE — 93925 LOWER EXTREMITY STUDY: CPT

## 2020-11-18 PROCEDURE — 73620 X-RAY EXAM OF FOOT: CPT

## 2020-11-18 PROCEDURE — 36415 COLL VENOUS BLD VENIPUNCTURE: CPT

## 2020-11-18 PROCEDURE — 87070 CULTURE OTHR SPECIMN AEROBIC: CPT

## 2020-11-18 PROCEDURE — 85347 COAGULATION TIME ACTIVATED: CPT

## 2020-11-18 PROCEDURE — 93306 TTE W/DOPPLER COMPLETE: CPT

## 2020-11-18 PROCEDURE — 90715 TDAP VACCINE 7 YRS/> IM: CPT

## 2020-11-18 PROCEDURE — 87076 CULTURE ANAEROBE IDENT EACH: CPT

## 2020-11-18 PROCEDURE — 85730 THROMBOPLASTIN TIME PARTIAL: CPT

## 2020-11-18 PROCEDURE — 75716 ARTERY X-RAYS ARMS/LEGS: CPT

## 2020-11-18 PROCEDURE — 80048 BASIC METABOLIC PNL TOTAL CA: CPT

## 2020-11-18 PROCEDURE — 83735 ASSAY OF MAGNESIUM: CPT

## 2020-11-18 PROCEDURE — 81001 URINALYSIS AUTO W/SCOPE: CPT

## 2020-11-18 PROCEDURE — 93356 MYOCRD STRAIN IMG SPCKL TRCK: CPT

## 2020-11-18 PROCEDURE — 84550 ASSAY OF BLOOD/URIC ACID: CPT

## 2020-11-18 PROCEDURE — 93005 ELECTROCARDIOGRAM TRACING: CPT

## 2020-11-18 PROCEDURE — 82948 REAGENT STRIP/BLOOD GLUCOSE: CPT

## 2020-11-18 PROCEDURE — 85027 COMPLETE CBC AUTOMATED: CPT

## 2020-11-18 PROCEDURE — 85610 PROTHROMBIN TIME: CPT

## 2020-11-18 PROCEDURE — 87205 SMEAR GRAM STAIN: CPT

## 2020-11-18 PROCEDURE — 37225: CPT

## 2020-11-18 PROCEDURE — 37229: CPT

## 2020-11-18 PROCEDURE — 99157 MOD SED OTHER PHYS/QHP EA: CPT

## 2020-11-18 PROCEDURE — 84100 ASSAY OF PHOSPHORUS: CPT

## 2020-11-18 PROCEDURE — 83036 HEMOGLOBIN GLYCOSYLATED A1C: CPT

## 2020-11-18 PROCEDURE — 99156 MOD SED OTH PHYS/QHP 5/>YRS: CPT

## 2020-11-18 RX ADMIN — SODIUM CHLORIDE SCH UNIT: 9 INJECTION, SOLUTION INTRAVENOUS at 21:52

## 2020-11-18 RX ADMIN — PIPERACILLIN SODIUM AND TAZOBACTAM SODIUM SCH MLS/HR: .375; 3 INJECTION, POWDER, LYOPHILIZED, FOR SOLUTION INTRAVENOUS at 21:43

## 2020-11-18 RX ADMIN — SODIUM CHLORIDE SCH MLS/HR: 0.9 INJECTION, SOLUTION INTRAVENOUS at 21:43

## 2020-11-19 VITALS — DIASTOLIC BLOOD PRESSURE: 86 MMHG | SYSTOLIC BLOOD PRESSURE: 150 MMHG

## 2020-11-19 VITALS — DIASTOLIC BLOOD PRESSURE: 73 MMHG | SYSTOLIC BLOOD PRESSURE: 126 MMHG

## 2020-11-19 VITALS — SYSTOLIC BLOOD PRESSURE: 110 MMHG | DIASTOLIC BLOOD PRESSURE: 65 MMHG

## 2020-11-19 VITALS — DIASTOLIC BLOOD PRESSURE: 61 MMHG | SYSTOLIC BLOOD PRESSURE: 139 MMHG

## 2020-11-19 VITALS — SYSTOLIC BLOOD PRESSURE: 112 MMHG | DIASTOLIC BLOOD PRESSURE: 66 MMHG

## 2020-11-19 VITALS — SYSTOLIC BLOOD PRESSURE: 110 MMHG | DIASTOLIC BLOOD PRESSURE: 67 MMHG

## 2020-11-19 VITALS — SYSTOLIC BLOOD PRESSURE: 111 MMHG | DIASTOLIC BLOOD PRESSURE: 63 MMHG

## 2020-11-19 VITALS — DIASTOLIC BLOOD PRESSURE: 72 MMHG | SYSTOLIC BLOOD PRESSURE: 126 MMHG

## 2020-11-19 VITALS — SYSTOLIC BLOOD PRESSURE: 168 MMHG | DIASTOLIC BLOOD PRESSURE: 95 MMHG

## 2020-11-19 VITALS — SYSTOLIC BLOOD PRESSURE: 119 MMHG | DIASTOLIC BLOOD PRESSURE: 71 MMHG

## 2020-11-19 VITALS — DIASTOLIC BLOOD PRESSURE: 79 MMHG | SYSTOLIC BLOOD PRESSURE: 162 MMHG

## 2020-11-19 VITALS — DIASTOLIC BLOOD PRESSURE: 68 MMHG | SYSTOLIC BLOOD PRESSURE: 125 MMHG

## 2020-11-19 VITALS — SYSTOLIC BLOOD PRESSURE: 107 MMHG | DIASTOLIC BLOOD PRESSURE: 61 MMHG

## 2020-11-19 VITALS — DIASTOLIC BLOOD PRESSURE: 81 MMHG | SYSTOLIC BLOOD PRESSURE: 136 MMHG

## 2020-11-19 VITALS — SYSTOLIC BLOOD PRESSURE: 171 MMHG | DIASTOLIC BLOOD PRESSURE: 86 MMHG

## 2020-11-19 VITALS — DIASTOLIC BLOOD PRESSURE: 87 MMHG | SYSTOLIC BLOOD PRESSURE: 168 MMHG

## 2020-11-19 VITALS — DIASTOLIC BLOOD PRESSURE: 101 MMHG | SYSTOLIC BLOOD PRESSURE: 161 MMHG

## 2020-11-19 VITALS — SYSTOLIC BLOOD PRESSURE: 114 MMHG | DIASTOLIC BLOOD PRESSURE: 68 MMHG

## 2020-11-19 VITALS — DIASTOLIC BLOOD PRESSURE: 68 MMHG | SYSTOLIC BLOOD PRESSURE: 114 MMHG

## 2020-11-19 VITALS — SYSTOLIC BLOOD PRESSURE: 109 MMHG | DIASTOLIC BLOOD PRESSURE: 59 MMHG

## 2020-11-19 VITALS — DIASTOLIC BLOOD PRESSURE: 78 MMHG | SYSTOLIC BLOOD PRESSURE: 134 MMHG

## 2020-11-19 VITALS — DIASTOLIC BLOOD PRESSURE: 75 MMHG | SYSTOLIC BLOOD PRESSURE: 124 MMHG

## 2020-11-19 VITALS — DIASTOLIC BLOOD PRESSURE: 69 MMHG | SYSTOLIC BLOOD PRESSURE: 116 MMHG

## 2020-11-19 VITALS — SYSTOLIC BLOOD PRESSURE: 102 MMHG | DIASTOLIC BLOOD PRESSURE: 61 MMHG

## 2020-11-19 VITALS — DIASTOLIC BLOOD PRESSURE: 67 MMHG | SYSTOLIC BLOOD PRESSURE: 125 MMHG

## 2020-11-19 LAB
BASOPHILS NFR BLD AUTO: 0.2 % (ref 0–5)
BUN SERPL-MCNC: 31 MG/DL (ref 7–18)
CHLORIDE SERPL-SCNC: 103 MMOL/L (ref 101–111)
CO2 SERPL-SCNC: 27 MMOL/L (ref 21–32)
CREAT SERPL-MCNC: 1.9 MG/DL (ref 0.5–1.5)
EOSINOPHIL NFR BLD AUTO: 1.7 % (ref 0–8)
ERYTHROCYTE [DISTWIDTH] IN BLOOD BY AUTOMATED COUNT: 13.1 % (ref 11–15.5)
GFR SERPL CREATININE-BSD FRML MDRD: 39 ML/MIN (ref 60–?)
GLUCOSE SERPL-MCNC: 218 MG/DL (ref 70–105)
HCT VFR BLD AUTO: 37.2 % (ref 42–54)
LYMPHOCYTES NFR SPEC AUTO: 28 % (ref 21–51)
MCH RBC QN AUTO: 33.2 PG (ref 27–33)
MCHC RBC AUTO-ENTMCNC: 34.1 G/DL (ref 32–36)
MCV RBC AUTO: 97.1 FL (ref 79–99)
MONOCYTES NFR BLD AUTO: 12.6 % (ref 3–13)
NEUTROPHILS NFR BLD AUTO: 57.2 % (ref 40–77)
NRBC BLD MANUAL-RTO: 0 % (ref 0–0.19)
PLATELET # BLD AUTO: 127 K/UL (ref 130–400)
POTASSIUM SERPL-SCNC: 4.6 MMOL/L (ref 3.5–5.1)
RBC # BLD AUTO: 3.83 MIL/UL (ref 4.5–6.2)
SODIUM SERPL-SCNC: 136 MMOL/L (ref 136–145)
WBC # BLD AUTO: 5.9 K/UL (ref 4.8–10.8)

## 2020-11-19 PROCEDURE — 0Y6S0Z1 DETACHMENT AT LEFT 2ND TOE, HIGH, OPEN APPROACH: ICD-10-PCS | Performed by: PODIATRIST

## 2020-11-19 RX ADMIN — ENOXAPARIN SODIUM SCH MG: 30 INJECTION SUBCUTANEOUS at 09:00

## 2020-11-19 RX ADMIN — SODIUM CHLORIDE SCH UNIT: 9 INJECTION, SOLUTION INTRAVENOUS at 16:30

## 2020-11-19 RX ADMIN — PANTOPRAZOLE SODIUM SCH MG: 40 TABLET, DELAYED RELEASE ORAL at 09:00

## 2020-11-19 RX ADMIN — SODIUM CHLORIDE SCH UNIT: 9 INJECTION, SOLUTION INTRAVENOUS at 21:31

## 2020-11-19 RX ADMIN — ATORVASTATIN CALCIUM SCH MG: 40 TABLET, FILM COATED ORAL at 18:17

## 2020-11-19 RX ADMIN — PIPERACILLIN SODIUM AND TAZOBACTAM SODIUM SCH MLS/HR: .375; 3 INJECTION, POWDER, LYOPHILIZED, FOR SOLUTION INTRAVENOUS at 12:15

## 2020-11-19 RX ADMIN — ATENOLOL SCH MG: 25 TABLET ORAL at 18:22

## 2020-11-19 RX ADMIN — SODIUM CHLORIDE SCH MLS/HR: 0.9 INJECTION, SOLUTION INTRAVENOUS at 12:48

## 2020-11-19 RX ADMIN — PIPERACILLIN SODIUM AND TAZOBACTAM SODIUM SCH MLS/HR: .375; 3 INJECTION, POWDER, LYOPHILIZED, FOR SOLUTION INTRAVENOUS at 21:24

## 2020-11-19 RX ADMIN — SODIUM CHLORIDE SCH UNIT: 9 INJECTION, SOLUTION INTRAVENOUS at 06:20

## 2020-11-19 RX ADMIN — SODIUM CHLORIDE SCH MLS/HR: 0.9 INJECTION, SOLUTION INTRAVENOUS at 21:24

## 2020-11-19 RX ADMIN — PIPERACILLIN SODIUM AND TAZOBACTAM SODIUM SCH MLS/HR: .375; 3 INJECTION, POWDER, LYOPHILIZED, FOR SOLUTION INTRAVENOUS at 05:40

## 2020-11-19 RX ADMIN — SODIUM CHLORIDE SCH UNIT: 9 INJECTION, SOLUTION INTRAVENOUS at 11:30

## 2020-11-19 NOTE — NUR
DCP

CM met with pt discussed dc plans. Pt is independent prior to admission, lives at home with 
spouse. Pt has a walker, cane, wheelchair, crutches, shower chair, bedside commode. Denies 
any other equipments/services. Feels safe to go back home, still drives, spouse able to 
assist with transportation and needs as necessary. Asked pt if will consider placement if MD 
recommends, at this time pt declined, pt verbalized his wife is a nurse at [employer] and will 
be able to care for wound as well as assist with iv administration if needed, pt prefer to 
go back home. Informed pt will wait for MD recommendations for now. DC plan to home w/poss 
HH and abx iv. CM to continue to follow up.

-------------------------------------------------------------------------------

Addendum: 11/19/20 at 1233 by MARK KONG LVN CM

-------------------------------------------------------------------------------

Amended: Links added.

## 2020-11-20 VITALS — DIASTOLIC BLOOD PRESSURE: 98 MMHG | SYSTOLIC BLOOD PRESSURE: 174 MMHG

## 2020-11-20 VITALS — SYSTOLIC BLOOD PRESSURE: 139 MMHG | DIASTOLIC BLOOD PRESSURE: 76 MMHG

## 2020-11-20 VITALS — SYSTOLIC BLOOD PRESSURE: 139 MMHG | DIASTOLIC BLOOD PRESSURE: 81 MMHG

## 2020-11-20 VITALS — SYSTOLIC BLOOD PRESSURE: 109 MMHG | DIASTOLIC BLOOD PRESSURE: 70 MMHG

## 2020-11-20 VITALS — SYSTOLIC BLOOD PRESSURE: 135 MMHG | DIASTOLIC BLOOD PRESSURE: 72 MMHG

## 2020-11-20 VITALS — DIASTOLIC BLOOD PRESSURE: 94 MMHG | SYSTOLIC BLOOD PRESSURE: 158 MMHG

## 2020-11-20 VITALS — DIASTOLIC BLOOD PRESSURE: 86 MMHG | SYSTOLIC BLOOD PRESSURE: 159 MMHG

## 2020-11-20 VITALS — DIASTOLIC BLOOD PRESSURE: 82 MMHG | SYSTOLIC BLOOD PRESSURE: 137 MMHG

## 2020-11-20 VITALS — DIASTOLIC BLOOD PRESSURE: 92 MMHG | SYSTOLIC BLOOD PRESSURE: 175 MMHG

## 2020-11-20 VITALS — SYSTOLIC BLOOD PRESSURE: 97 MMHG | DIASTOLIC BLOOD PRESSURE: 64 MMHG

## 2020-11-20 VITALS — DIASTOLIC BLOOD PRESSURE: 88 MMHG | SYSTOLIC BLOOD PRESSURE: 156 MMHG

## 2020-11-20 VITALS — DIASTOLIC BLOOD PRESSURE: 92 MMHG | SYSTOLIC BLOOD PRESSURE: 159 MMHG

## 2020-11-20 LAB
APTT PPP: 27.2 SEC (ref 26.3–35.5)
BASOPHILS NFR BLD AUTO: 0.3 % (ref 0–5)
BUN SERPL-MCNC: 20 MG/DL (ref 7–18)
CHLORIDE SERPL-SCNC: 105 MMOL/L (ref 101–111)
CO2 SERPL-SCNC: 29 MMOL/L (ref 21–32)
CREAT SERPL-MCNC: 1.1 MG/DL (ref 0.5–1.5)
EOSINOPHIL NFR BLD AUTO: 1.4 % (ref 0–8)
ERYTHROCYTE [DISTWIDTH] IN BLOOD BY AUTOMATED COUNT: 13.1 % (ref 11–15.5)
GFR SERPL CREATININE-BSD FRML MDRD: 73 ML/MIN (ref 60–?)
GLUCOSE SERPL-MCNC: 82 MG/DL (ref 70–105)
HBA1C MFR BLD: 8.9 % (ref 4–6)
HCT VFR BLD AUTO: 42.8 % (ref 42–54)
INR PPP: 0.98 (ref 0.85–1.15)
LYMPHOCYTES NFR SPEC AUTO: 26.5 % (ref 21–51)
MAGNESIUM SERPL-MCNC: 1.8 MG/DL (ref 1.8–2.4)
MCH RBC QN AUTO: 33 PG (ref 27–33)
MCHC RBC AUTO-ENTMCNC: 34.1 G/DL (ref 32–36)
MCV RBC AUTO: 96.6 FL (ref 79–99)
MONOCYTES NFR BLD AUTO: 10.5 % (ref 3–13)
NEUTROPHILS NFR BLD AUTO: 61 % (ref 40–77)
NRBC BLD MANUAL-RTO: 0 % (ref 0–0.19)
PHOSPHATE SERPL-MCNC: 3.2 MG/DL (ref 2.5–4.9)
PLATELET # BLD AUTO: 142 K/UL (ref 130–400)
POTASSIUM SERPL-SCNC: 4.3 MMOL/L (ref 3.5–5.1)
PROTHROMBIN TIME: 10.6 SEC (ref 9.6–11.6)
RBC # BLD AUTO: 4.43 MIL/UL (ref 4.5–6.2)
SODIUM SERPL-SCNC: 140 MMOL/L (ref 136–145)
URATE SERPL-MCNC: 4.7 MG/DL (ref 2.6–7.2)
WBC # BLD AUTO: 5.8 K/UL (ref 4.8–10.8)

## 2020-11-20 PROCEDURE — B41G1ZZ FLUOROSCOPY OF LEFT LOWER EXTREMITY ARTERIES USING LOW OSMOLAR CONTRAST: ICD-10-PCS | Performed by: INTERNAL MEDICINE

## 2020-11-20 RX ADMIN — ASPIRIN TAB DELAYED RELEASE 81 MG SCH MG: 81 TABLET DELAYED RESPONSE at 09:00

## 2020-11-20 RX ADMIN — ACETYLCYSTEINE SCH MG: 200 SOLUTION ORAL; RESPIRATORY (INHALATION) at 20:31

## 2020-11-20 RX ADMIN — PIPERACILLIN SODIUM AND TAZOBACTAM SODIUM SCH MLS/HR: .375; 3 INJECTION, POWDER, LYOPHILIZED, FOR SOLUTION INTRAVENOUS at 05:31

## 2020-11-20 RX ADMIN — LISINOPRIL SCH MG: 5 TABLET ORAL at 09:00

## 2020-11-20 RX ADMIN — SODIUM CHLORIDE SCH UNIT: 9 INJECTION, SOLUTION INTRAVENOUS at 20:31

## 2020-11-20 RX ADMIN — INSULIN GLARGINE SCH UNITS: 100 INJECTION, SOLUTION SUBCUTANEOUS at 11:30

## 2020-11-20 RX ADMIN — ATENOLOL SCH MG: 25 TABLET ORAL at 09:00

## 2020-11-20 RX ADMIN — SODIUM CHLORIDE SCH UNIT: 9 INJECTION, SOLUTION INTRAVENOUS at 16:30

## 2020-11-20 RX ADMIN — ATORVASTATIN CALCIUM SCH MG: 40 TABLET, FILM COATED ORAL at 20:30

## 2020-11-20 RX ADMIN — Medication SCH MG: at 09:00

## 2020-11-20 RX ADMIN — ACETYLCYSTEINE SCH MG: 200 SOLUTION ORAL; RESPIRATORY (INHALATION) at 16:13

## 2020-11-20 RX ADMIN — PIPERACILLIN SODIUM AND TAZOBACTAM SODIUM SCH MLS/HR: .375; 3 INJECTION, POWDER, LYOPHILIZED, FOR SOLUTION INTRAVENOUS at 11:22

## 2020-11-20 RX ADMIN — ENOXAPARIN SODIUM SCH MG: 30 INJECTION SUBCUTANEOUS at 09:00

## 2020-11-20 RX ADMIN — SODIUM CHLORIDE SCH UNIT: 9 INJECTION, SOLUTION INTRAVENOUS at 06:04

## 2020-11-20 RX ADMIN — ATENOLOL SCH MG: 25 TABLET ORAL at 20:31

## 2020-11-20 RX ADMIN — SODIUM CHLORIDE SCH MLS/HR: 0.9 INJECTION, SOLUTION INTRAVENOUS at 19:58

## 2020-11-20 RX ADMIN — PIPERACILLIN SODIUM AND TAZOBACTAM SODIUM SCH MLS/HR: .375; 3 INJECTION, POWDER, LYOPHILIZED, FOR SOLUTION INTRAVENOUS at 19:57

## 2020-11-20 RX ADMIN — SODIUM CHLORIDE SCH UNIT: 9 INJECTION, SOLUTION INTRAVENOUS at 13:47

## 2020-11-20 RX ADMIN — KETOROLAC TROMETHAMINE SCH MG: 15 INJECTION, SOLUTION INTRAMUSCULAR; INTRAVENOUS at 22:15

## 2020-11-20 RX ADMIN — PANTOPRAZOLE SODIUM SCH MG: 40 TABLET, DELAYED RELEASE ORAL at 11:16

## 2020-11-20 RX ADMIN — CLOPIDOGREL BISULFATE SCH MG: 75 TABLET, FILM COATED ORAL at 09:00

## 2020-11-20 RX ADMIN — Medication SCH MG: at 20:31

## 2020-11-20 NOTE — NUR
PAIN

PT CLAIMS OF HAVING HEADACHE STILL. PAGED AJ, NP ON CALL FOR HOSPITALIST, VIA ANSWERING 
SERVICE. NP CALLED BACK AND REFERRED PT'S COMPLAINTS. NEW MED ORDER RECEIVED. WILL MEDICATE 
PT.

## 2020-11-20 NOTE — NUR
MEDS

SHIFT ASSESSMENT DONE, PLEASE REFER TO CHART. PT CLAIMS OF NAUSEA. MEDICATED PT WITH ZOFRAN 
IV. STARTED ON IVF OF NS REGULATED AT 80CC/HR X 10 HRS AS PER MD ORDER. KEPT ON BED REST 
WITH RT LEG STRAIGHT UNTIL 2200. CALL LIGHT WITHIN REACH. WILL RE-ASSESS PT. 

-------------------------------------------------------------------------------

Addendum: 11/21/20 at 0023 by DELROY LOMBARDO RN RN

-------------------------------------------------------------------------------

Amended: Links added.

## 2020-11-21 VITALS — SYSTOLIC BLOOD PRESSURE: 101 MMHG | DIASTOLIC BLOOD PRESSURE: 60 MMHG

## 2020-11-21 VITALS — SYSTOLIC BLOOD PRESSURE: 163 MMHG | DIASTOLIC BLOOD PRESSURE: 95 MMHG

## 2020-11-21 VITALS — SYSTOLIC BLOOD PRESSURE: 99 MMHG | DIASTOLIC BLOOD PRESSURE: 69 MMHG

## 2020-11-21 VITALS — DIASTOLIC BLOOD PRESSURE: 79 MMHG | SYSTOLIC BLOOD PRESSURE: 119 MMHG

## 2020-11-21 VITALS — SYSTOLIC BLOOD PRESSURE: 131 MMHG | DIASTOLIC BLOOD PRESSURE: 77 MMHG

## 2020-11-21 VITALS — SYSTOLIC BLOOD PRESSURE: 150 MMHG | DIASTOLIC BLOOD PRESSURE: 79 MMHG

## 2020-11-21 VITALS — SYSTOLIC BLOOD PRESSURE: 112 MMHG | DIASTOLIC BLOOD PRESSURE: 71 MMHG

## 2020-11-21 LAB
BUN SERPL-MCNC: 18 MG/DL (ref 7–18)
CHLORIDE SERPL-SCNC: 104 MMOL/L (ref 101–111)
CO2 SERPL-SCNC: 26 MMOL/L (ref 21–32)
CREAT SERPL-MCNC: 1.3 MG/DL (ref 0.5–1.5)
GFR SERPL CREATININE-BSD FRML MDRD: 60 ML/MIN (ref 60–?)
GLUCOSE SERPL-MCNC: 192 MG/DL (ref 70–105)
GLUCOSE UR STRIP-MCNC: (no result) MG/DL
PH UR STRIP: 5 [PH] (ref 5–8)
POTASSIUM SERPL-SCNC: 3.9 MMOL/L (ref 3.5–5.1)
RBC #/AREA URNS HPF: (no result) /HPF (ref 0–1)
SODIUM SERPL-SCNC: 138 MMOL/L (ref 136–145)
SP GR UR STRIP: 1.07 (ref 1–1.03)
UROBILINOGEN UR STRIP-MCNC: 0.2 MG/DL (ref 0.2–1)
WBC #/AREA URNS HPF: (no result) /HPF (ref 0–1)

## 2020-11-21 RX ADMIN — ENOXAPARIN SODIUM SCH MG: 30 INJECTION SUBCUTANEOUS at 09:47

## 2020-11-21 RX ADMIN — ASPIRIN TAB DELAYED RELEASE 81 MG SCH MG: 81 TABLET DELAYED RESPONSE at 09:43

## 2020-11-21 RX ADMIN — KETOROLAC TROMETHAMINE SCH MG: 15 INJECTION, SOLUTION INTRAMUSCULAR; INTRAVENOUS at 22:15

## 2020-11-21 RX ADMIN — ATORVASTATIN CALCIUM SCH MG: 40 TABLET, FILM COATED ORAL at 21:26

## 2020-11-21 RX ADMIN — LISINOPRIL SCH MG: 5 TABLET ORAL at 09:45

## 2020-11-21 RX ADMIN — PIPERACILLIN SODIUM AND TAZOBACTAM SODIUM SCH MLS/HR: .375; 3 INJECTION, POWDER, LYOPHILIZED, FOR SOLUTION INTRAVENOUS at 21:27

## 2020-11-21 RX ADMIN — SODIUM CHLORIDE SCH UNIT: 9 INJECTION, SOLUTION INTRAVENOUS at 13:36

## 2020-11-21 RX ADMIN — SODIUM CHLORIDE SCH UNIT: 9 INJECTION, SOLUTION INTRAVENOUS at 10:11

## 2020-11-21 RX ADMIN — ACETYLCYSTEINE SCH MG: 200 SOLUTION ORAL; RESPIRATORY (INHALATION) at 21:27

## 2020-11-21 RX ADMIN — ACETYLCYSTEINE SCH MG: 200 SOLUTION ORAL; RESPIRATORY (INHALATION) at 09:45

## 2020-11-21 RX ADMIN — SODIUM CHLORIDE SCH UNIT: 9 INJECTION, SOLUTION INTRAVENOUS at 17:47

## 2020-11-21 RX ADMIN — SODIUM CHLORIDE SCH UNIT: 9 INJECTION, SOLUTION INTRAVENOUS at 21:00

## 2020-11-21 RX ADMIN — CLOPIDOGREL BISULFATE SCH MG: 75 TABLET, FILM COATED ORAL at 09:45

## 2020-11-21 RX ADMIN — ATENOLOL SCH MG: 25 TABLET ORAL at 21:26

## 2020-11-21 RX ADMIN — SODIUM CHLORIDE SCH UNIT: 9 INJECTION, SOLUTION INTRAVENOUS at 05:55

## 2020-11-21 RX ADMIN — ATENOLOL SCH MG: 25 TABLET ORAL at 09:44

## 2020-11-21 RX ADMIN — Medication SCH MG: at 21:26

## 2020-11-21 RX ADMIN — PIPERACILLIN SODIUM AND TAZOBACTAM SODIUM SCH MLS/HR: .375; 3 INJECTION, POWDER, LYOPHILIZED, FOR SOLUTION INTRAVENOUS at 13:37

## 2020-11-21 RX ADMIN — SODIUM CHLORIDE SCH UNIT: 9 INJECTION, SOLUTION INTRAVENOUS at 17:46

## 2020-11-21 RX ADMIN — INSULIN GLARGINE SCH UNITS: 100 INJECTION, SOLUTION SUBCUTANEOUS at 10:04

## 2020-11-21 RX ADMIN — WATER PRN GM: 1 INJECTION INTRAVENOUS at 21:52

## 2020-11-21 RX ADMIN — Medication SCH MG: at 09:45

## 2020-11-21 RX ADMIN — PIPERACILLIN SODIUM AND TAZOBACTAM SODIUM SCH MLS/HR: .375; 3 INJECTION, POWDER, LYOPHILIZED, FOR SOLUTION INTRAVENOUS at 04:03

## 2020-11-21 RX ADMIN — SODIUM CHLORIDE SCH UNIT: 9 INJECTION, SOLUTION INTRAVENOUS at 13:37

## 2020-11-21 RX ADMIN — PANTOPRAZOLE SODIUM SCH MG: 40 TABLET, DELAYED RELEASE ORAL at 09:44

## 2020-11-21 RX ADMIN — CLOSTRIDIUM TETANI TOXOID ANTIGEN (FORMALDEHYDE INACTIVATED), CORYNEBACTERIUM DIPHTHERIAE TOXOID ANTIGEN (FORMALDEHYDE INACTIVATED), BORDETELLA PERTUSSIS TOXOID ANTIGEN (GLUTARALDEHYDE INACTIVATED), BORDETELLA PERTUSSIS FILAMENTOUS HEMAGGLUTININ ANTIGEN (FORMALDEHYDE INACTIVATED), BORDETELLA PERTUSSIS PERTACTIN ANTIGEN, AND BORDETELLA PERTUSSIS FIMBRIAE 2/3 ANTIGEN SCH ML: 5; 2; 2.5; 5; 3; 5 INJECTION, SUSPENSION INTRAMUSCULAR at 09:52

## 2020-11-21 NOTE — NUR
ROUNDS

PT RESTING WELL, NO DISTRESS NOTED. KEPT UNDISTURBED FOR NOW. WILL MONITOR PT. CALL LIGHT 
WITHIN REACH.

## 2020-11-21 NOTE — NUR
SL

PT ALREADY AWAKE, NO DISTRESS NOTED. NO CONCERNS VERBALIZED. SALINE LOCKED PIV, NS X 10 
HOURS DONE. KEPT COMFORTABLE IN BED. FOR MORE CARE.

## 2020-11-22 VITALS — DIASTOLIC BLOOD PRESSURE: 93 MMHG | SYSTOLIC BLOOD PRESSURE: 163 MMHG

## 2020-11-22 VITALS — DIASTOLIC BLOOD PRESSURE: 72 MMHG | SYSTOLIC BLOOD PRESSURE: 117 MMHG

## 2020-11-22 VITALS — DIASTOLIC BLOOD PRESSURE: 59 MMHG | SYSTOLIC BLOOD PRESSURE: 156 MMHG

## 2020-11-22 VITALS — DIASTOLIC BLOOD PRESSURE: 88 MMHG | SYSTOLIC BLOOD PRESSURE: 171 MMHG

## 2020-11-22 VITALS — DIASTOLIC BLOOD PRESSURE: 67 MMHG | SYSTOLIC BLOOD PRESSURE: 113 MMHG

## 2020-11-22 LAB
BASOPHILS NFR BLD AUTO: 0.4 % (ref 0–5)
BUN SERPL-MCNC: 21 MG/DL (ref 7–18)
CHLORIDE SERPL-SCNC: 106 MMOL/L (ref 101–111)
CO2 SERPL-SCNC: 22 MMOL/L (ref 21–32)
CREAT SERPL-MCNC: 1.2 MG/DL (ref 0.5–1.5)
EOSINOPHIL NFR BLD AUTO: 2.1 % (ref 0–8)
ERYTHROCYTE [DISTWIDTH] IN BLOOD BY AUTOMATED COUNT: 12.9 % (ref 11–15.5)
GFR SERPL CREATININE-BSD FRML MDRD: 66 ML/MIN (ref 60–?)
GLUCOSE SERPL-MCNC: 172 MG/DL (ref 70–105)
HCT VFR BLD AUTO: 39.2 % (ref 42–54)
LYMPHOCYTES NFR SPEC AUTO: 28.8 % (ref 21–51)
MCH RBC QN AUTO: 33.4 PG (ref 27–33)
MCHC RBC AUTO-ENTMCNC: 34.7 G/DL (ref 32–36)
MCV RBC AUTO: 96.3 FL (ref 79–99)
MONOCYTES NFR BLD AUTO: 11.7 % (ref 3–13)
NEUTROPHILS NFR BLD AUTO: 56.6 % (ref 40–77)
NRBC BLD MANUAL-RTO: 0 % (ref 0–0.19)
PLATELET # BLD AUTO: 126 K/UL (ref 130–400)
POTASSIUM SERPL-SCNC: 4.4 MMOL/L (ref 3.5–5.1)
RBC # BLD AUTO: 4.07 MIL/UL (ref 4.5–6.2)
SODIUM SERPL-SCNC: 137 MMOL/L (ref 136–145)
WBC # BLD AUTO: 4.7 K/UL (ref 4.8–10.8)

## 2020-11-22 RX ADMIN — ATENOLOL SCH MG: 25 TABLET ORAL at 21:15

## 2020-11-22 RX ADMIN — ACETYLCYSTEINE SCH MG: 200 SOLUTION ORAL; RESPIRATORY (INHALATION) at 21:15

## 2020-11-22 RX ADMIN — ENOXAPARIN SODIUM SCH MG: 30 INJECTION SUBCUTANEOUS at 08:46

## 2020-11-22 RX ADMIN — SODIUM CHLORIDE SCH UNIT: 9 INJECTION, SOLUTION INTRAVENOUS at 21:00

## 2020-11-22 RX ADMIN — SODIUM CHLORIDE SCH UNIT: 9 INJECTION, SOLUTION INTRAVENOUS at 12:18

## 2020-11-22 RX ADMIN — ATORVASTATIN CALCIUM SCH MG: 40 TABLET, FILM COATED ORAL at 21:06

## 2020-11-22 RX ADMIN — PANTOPRAZOLE SODIUM SCH MG: 40 TABLET, DELAYED RELEASE ORAL at 08:40

## 2020-11-22 RX ADMIN — SODIUM CHLORIDE SCH UNIT: 9 INJECTION, SOLUTION INTRAVENOUS at 07:30

## 2020-11-22 RX ADMIN — INSULIN GLARGINE SCH UNITS: 100 INJECTION, SOLUTION SUBCUTANEOUS at 05:55

## 2020-11-22 RX ADMIN — PIPERACILLIN SODIUM AND TAZOBACTAM SODIUM SCH MLS/HR: .375; 3 INJECTION, POWDER, LYOPHILIZED, FOR SOLUTION INTRAVENOUS at 12:18

## 2020-11-22 RX ADMIN — Medication SCH MG: at 21:06

## 2020-11-22 RX ADMIN — CLOSTRIDIUM TETANI TOXOID ANTIGEN (FORMALDEHYDE INACTIVATED), CORYNEBACTERIUM DIPHTHERIAE TOXOID ANTIGEN (FORMALDEHYDE INACTIVATED), BORDETELLA PERTUSSIS TOXOID ANTIGEN (GLUTARALDEHYDE INACTIVATED), BORDETELLA PERTUSSIS FILAMENTOUS HEMAGGLUTININ ANTIGEN (FORMALDEHYDE INACTIVATED), BORDETELLA PERTUSSIS PERTACTIN ANTIGEN, AND BORDETELLA PERTUSSIS FIMBRIAE 2/3 ANTIGEN SCH ML: 5; 2; 2.5; 5; 3; 5 INJECTION, SUSPENSION INTRAMUSCULAR at 09:00

## 2020-11-22 RX ADMIN — CLOPIDOGREL BISULFATE SCH MG: 75 TABLET, FILM COATED ORAL at 08:40

## 2020-11-22 RX ADMIN — LISINOPRIL SCH MG: 5 TABLET ORAL at 08:39

## 2020-11-22 RX ADMIN — ATENOLOL SCH MG: 25 TABLET ORAL at 08:41

## 2020-11-22 RX ADMIN — INSULIN GLARGINE SCH UNITS: 100 INJECTION, SOLUTION SUBCUTANEOUS at 08:47

## 2020-11-22 RX ADMIN — SODIUM CHLORIDE SCH UNIT: 9 INJECTION, SOLUTION INTRAVENOUS at 16:56

## 2020-11-22 RX ADMIN — ACETYLCYSTEINE SCH MG: 200 SOLUTION ORAL; RESPIRATORY (INHALATION) at 08:43

## 2020-11-22 RX ADMIN — Medication SCH MG: at 08:39

## 2020-11-22 RX ADMIN — SODIUM CHLORIDE SCH UNIT: 9 INJECTION, SOLUTION INTRAVENOUS at 12:17

## 2020-11-22 RX ADMIN — PIPERACILLIN SODIUM AND TAZOBACTAM SODIUM SCH MLS/HR: .375; 3 INJECTION, POWDER, LYOPHILIZED, FOR SOLUTION INTRAVENOUS at 03:49

## 2020-11-22 RX ADMIN — ASPIRIN TAB DELAYED RELEASE 81 MG SCH MG: 81 TABLET DELAYED RESPONSE at 08:39

## 2020-11-22 RX ADMIN — PIPERACILLIN SODIUM AND TAZOBACTAM SODIUM SCH MLS/HR: .375; 3 INJECTION, POWDER, LYOPHILIZED, FOR SOLUTION INTRAVENOUS at 21:05

## 2020-11-22 NOTE — NUR
DRESSING CHANGE

DONE AS PER ORDERS. PT LAUREN WELL. MINIMAL DRAINAGE NOTED ON GAUZE PAD. 

CLEANSED AREA WITH NS, PLACED VASELINE GAUZE AND DRY GAUZE OVER, WRAPPED FOOT WITH KERLIX 
AND SECURED WITH TAPE. PT LAUREN WELL. NO C/O OF PAIN OR DISCOMFORTS.

## 2020-11-23 VITALS — DIASTOLIC BLOOD PRESSURE: 105 MMHG | SYSTOLIC BLOOD PRESSURE: 171 MMHG

## 2020-11-23 VITALS — SYSTOLIC BLOOD PRESSURE: 92 MMHG | DIASTOLIC BLOOD PRESSURE: 49 MMHG

## 2020-11-23 VITALS — DIASTOLIC BLOOD PRESSURE: 84 MMHG | SYSTOLIC BLOOD PRESSURE: 140 MMHG

## 2020-11-23 VITALS — DIASTOLIC BLOOD PRESSURE: 67 MMHG | SYSTOLIC BLOOD PRESSURE: 139 MMHG

## 2020-11-23 VITALS — DIASTOLIC BLOOD PRESSURE: 76 MMHG | SYSTOLIC BLOOD PRESSURE: 144 MMHG

## 2020-11-23 VITALS — SYSTOLIC BLOOD PRESSURE: 116 MMHG | DIASTOLIC BLOOD PRESSURE: 68 MMHG

## 2020-11-23 VITALS — DIASTOLIC BLOOD PRESSURE: 85 MMHG | SYSTOLIC BLOOD PRESSURE: 146 MMHG

## 2020-11-23 LAB
APTT PPP: 26.1 SEC (ref 26.3–35.5)
BUN SERPL-MCNC: 20 MG/DL (ref 7–18)
CHLORIDE SERPL-SCNC: 107 MMOL/L (ref 101–111)
CO2 SERPL-SCNC: 26 MMOL/L (ref 21–32)
CREAT SERPL-MCNC: 1.3 MG/DL (ref 0.5–1.5)
ERYTHROCYTE [DISTWIDTH] IN BLOOD BY AUTOMATED COUNT: 13.2 % (ref 11–15.5)
GFR SERPL CREATININE-BSD FRML MDRD: 60 ML/MIN (ref 60–?)
GLUCOSE SERPL-MCNC: 234 MG/DL (ref 70–105)
HCT VFR BLD AUTO: 39.6 % (ref 42–54)
INR PPP: 0.96 (ref 0.85–1.15)
MCH RBC QN AUTO: 32.8 PG (ref 27–33)
MCHC RBC AUTO-ENTMCNC: 33.6 G/DL (ref 32–36)
MCV RBC AUTO: 97.8 FL (ref 79–99)
NRBC BLD MANUAL-RTO: 0 % (ref 0–0.19)
PLATELET # BLD AUTO: 124 K/UL (ref 130–400)
POTASSIUM SERPL-SCNC: 4.8 MMOL/L (ref 3.5–5.1)
PROTHROMBIN TIME: 10.4 SEC (ref 9.6–11.6)
RBC # BLD AUTO: 4.05 MIL/UL (ref 4.5–6.2)
SODIUM SERPL-SCNC: 140 MMOL/L (ref 136–145)
WBC # BLD AUTO: 4.6 K/UL (ref 4.8–10.8)

## 2020-11-23 RX ADMIN — LISINOPRIL SCH MG: 5 TABLET ORAL at 10:24

## 2020-11-23 RX ADMIN — SODIUM CHLORIDE SCH UNIT: 9 INJECTION, SOLUTION INTRAVENOUS at 21:00

## 2020-11-23 RX ADMIN — INSULIN GLARGINE SCH UNITS: 100 INJECTION, SOLUTION SUBCUTANEOUS at 07:10

## 2020-11-23 RX ADMIN — Medication SCH MG: at 20:21

## 2020-11-23 RX ADMIN — Medication SCH MG: at 10:24

## 2020-11-23 RX ADMIN — ACETYLCYSTEINE SCH MG: 200 SOLUTION ORAL; RESPIRATORY (INHALATION) at 10:24

## 2020-11-23 RX ADMIN — SODIUM CHLORIDE SCH UNIT: 9 INJECTION, SOLUTION INTRAVENOUS at 17:48

## 2020-11-23 RX ADMIN — ASPIRIN TAB DELAYED RELEASE 81 MG SCH MG: 81 TABLET DELAYED RESPONSE at 10:24

## 2020-11-23 RX ADMIN — PIPERACILLIN SODIUM AND TAZOBACTAM SODIUM SCH MLS/HR: .375; 3 INJECTION, POWDER, LYOPHILIZED, FOR SOLUTION INTRAVENOUS at 12:38

## 2020-11-23 RX ADMIN — SODIUM CHLORIDE SCH UNIT: 9 INJECTION, SOLUTION INTRAVENOUS at 16:30

## 2020-11-23 RX ADMIN — ACETYLCYSTEINE SCH MG: 200 SOLUTION ORAL; RESPIRATORY (INHALATION) at 21:00

## 2020-11-23 RX ADMIN — PIPERACILLIN SODIUM AND TAZOBACTAM SODIUM SCH MLS/HR: .375; 3 INJECTION, POWDER, LYOPHILIZED, FOR SOLUTION INTRAVENOUS at 04:06

## 2020-11-23 RX ADMIN — SODIUM CHLORIDE SCH MLS/HR: 0.9 INJECTION, SOLUTION INTRAVENOUS at 14:30

## 2020-11-23 RX ADMIN — ENOXAPARIN SODIUM SCH MG: 30 INJECTION SUBCUTANEOUS at 10:25

## 2020-11-23 RX ADMIN — ATENOLOL SCH MG: 25 TABLET ORAL at 20:22

## 2020-11-23 RX ADMIN — INSULIN GLARGINE SCH UNITS: 100 INJECTION, SOLUTION SUBCUTANEOUS at 12:50

## 2020-11-23 RX ADMIN — CLOPIDOGREL BISULFATE SCH MG: 75 TABLET, FILM COATED ORAL at 10:24

## 2020-11-23 RX ADMIN — SODIUM CHLORIDE SCH UNIT: 9 INJECTION, SOLUTION INTRAVENOUS at 11:30

## 2020-11-23 RX ADMIN — SODIUM CHLORIDE SCH MLS/HR: 0.9 INJECTION, SOLUTION INTRAVENOUS at 05:00

## 2020-11-23 RX ADMIN — SODIUM CHLORIDE SCH UNIT: 9 INJECTION, SOLUTION INTRAVENOUS at 07:12

## 2020-11-23 RX ADMIN — SODIUM CHLORIDE SCH UNIT: 9 INJECTION, SOLUTION INTRAVENOUS at 07:13

## 2020-11-23 RX ADMIN — SODIUM CHLORIDE SCH UNIT: 9 INJECTION, SOLUTION INTRAVENOUS at 12:57

## 2020-11-23 RX ADMIN — ATORVASTATIN CALCIUM SCH MG: 40 TABLET, FILM COATED ORAL at 20:21

## 2020-11-23 RX ADMIN — PANTOPRAZOLE SODIUM SCH MG: 40 TABLET, DELAYED RELEASE ORAL at 10:24

## 2020-11-23 RX ADMIN — ATENOLOL SCH MG: 25 TABLET ORAL at 07:40

## 2020-11-23 RX ADMIN — PIPERACILLIN SODIUM AND TAZOBACTAM SODIUM SCH MLS/HR: .375; 3 INJECTION, POWDER, LYOPHILIZED, FOR SOLUTION INTRAVENOUS at 20:22

## 2020-11-23 NOTE — NUR
SINCE THE PT IS NOT GOING TO HAVE PROCEDURE TODAY, THE ADDITIONAL LANTUS WAS GIVEN TO EQUAL 
A TOTAL OF 25UNITS AS ORDERED THIS AM

## 2020-11-24 VITALS — SYSTOLIC BLOOD PRESSURE: 182 MMHG | DIASTOLIC BLOOD PRESSURE: 103 MMHG

## 2020-11-24 VITALS — SYSTOLIC BLOOD PRESSURE: 135 MMHG | DIASTOLIC BLOOD PRESSURE: 70 MMHG

## 2020-11-24 VITALS — SYSTOLIC BLOOD PRESSURE: 127 MMHG | DIASTOLIC BLOOD PRESSURE: 74 MMHG

## 2020-11-24 VITALS — SYSTOLIC BLOOD PRESSURE: 130 MMHG | DIASTOLIC BLOOD PRESSURE: 91 MMHG

## 2020-11-24 VITALS — SYSTOLIC BLOOD PRESSURE: 108 MMHG | DIASTOLIC BLOOD PRESSURE: 60 MMHG

## 2020-11-24 VITALS — DIASTOLIC BLOOD PRESSURE: 94 MMHG | SYSTOLIC BLOOD PRESSURE: 144 MMHG

## 2020-11-24 VITALS — SYSTOLIC BLOOD PRESSURE: 107 MMHG | DIASTOLIC BLOOD PRESSURE: 63 MMHG

## 2020-11-24 VITALS — DIASTOLIC BLOOD PRESSURE: 93 MMHG | SYSTOLIC BLOOD PRESSURE: 165 MMHG

## 2020-11-24 VITALS — DIASTOLIC BLOOD PRESSURE: 60 MMHG | SYSTOLIC BLOOD PRESSURE: 109 MMHG

## 2020-11-24 VITALS — SYSTOLIC BLOOD PRESSURE: 137 MMHG | DIASTOLIC BLOOD PRESSURE: 73 MMHG

## 2020-11-24 VITALS — SYSTOLIC BLOOD PRESSURE: 103 MMHG | DIASTOLIC BLOOD PRESSURE: 63 MMHG

## 2020-11-24 VITALS — SYSTOLIC BLOOD PRESSURE: 157 MMHG | DIASTOLIC BLOOD PRESSURE: 90 MMHG

## 2020-11-24 VITALS — SYSTOLIC BLOOD PRESSURE: 109 MMHG | DIASTOLIC BLOOD PRESSURE: 66 MMHG

## 2020-11-24 LAB
BASOPHILS NFR BLD AUTO: 0.4 % (ref 0–5)
BUN SERPL-MCNC: 17 MG/DL (ref 7–18)
CHLORIDE SERPL-SCNC: 106 MMOL/L (ref 101–111)
CO2 SERPL-SCNC: 25 MMOL/L (ref 21–32)
CREAT SERPL-MCNC: 1.1 MG/DL (ref 0.5–1.5)
EOSINOPHIL NFR BLD AUTO: 1.7 % (ref 0–8)
ERYTHROCYTE [DISTWIDTH] IN BLOOD BY AUTOMATED COUNT: 13.2 % (ref 11–15.5)
GFR SERPL CREATININE-BSD FRML MDRD: 73 ML/MIN (ref 60–?)
GLUCOSE SERPL-MCNC: 132 MG/DL (ref 70–105)
HCT VFR BLD AUTO: 39.9 % (ref 42–54)
LYMPHOCYTES NFR SPEC AUTO: 25.6 % (ref 21–51)
MCH RBC QN AUTO: 33.3 PG (ref 27–33)
MCHC RBC AUTO-ENTMCNC: 34.1 G/DL (ref 32–36)
MCV RBC AUTO: 97.6 FL (ref 79–99)
MONOCYTES NFR BLD AUTO: 11.8 % (ref 3–13)
NEUTROPHILS NFR BLD AUTO: 60.1 % (ref 40–77)
NRBC BLD MANUAL-RTO: 0 % (ref 0–0.19)
PLATELET # BLD AUTO: 131 K/UL (ref 130–400)
POTASSIUM SERPL-SCNC: 3.9 MMOL/L (ref 3.5–5.1)
RBC # BLD AUTO: 4.09 MIL/UL (ref 4.5–6.2)
SODIUM SERPL-SCNC: 139 MMOL/L (ref 136–145)
WBC # BLD AUTO: 5.4 K/UL (ref 4.8–10.8)

## 2020-11-24 PROCEDURE — 047N3ZZ DILATION OF LEFT POPLITEAL ARTERY, PERCUTANEOUS APPROACH: ICD-10-PCS | Performed by: INTERNAL MEDICINE

## 2020-11-24 PROCEDURE — 047U3ZZ DILATION OF LEFT PERONEAL ARTERY, PERCUTANEOUS APPROACH: ICD-10-PCS | Performed by: INTERNAL MEDICINE

## 2020-11-24 PROCEDURE — 04CN3ZZ EXTIRPATION OF MATTER FROM LEFT POPLITEAL ARTERY, PERCUTANEOUS APPROACH: ICD-10-PCS | Performed by: INTERNAL MEDICINE

## 2020-11-24 PROCEDURE — 04CQ3ZZ EXTIRPATION OF MATTER FROM LEFT ANTERIOR TIBIAL ARTERY, PERCUTANEOUS APPROACH: ICD-10-PCS | Performed by: INTERNAL MEDICINE

## 2020-11-24 PROCEDURE — 04CU3ZZ EXTIRPATION OF MATTER FROM LEFT PERONEAL ARTERY, PERCUTANEOUS APPROACH: ICD-10-PCS | Performed by: INTERNAL MEDICINE

## 2020-11-24 PROCEDURE — 047Q3ZZ DILATION OF LEFT ANTERIOR TIBIAL ARTERY, PERCUTANEOUS APPROACH: ICD-10-PCS | Performed by: INTERNAL MEDICINE

## 2020-11-24 RX ADMIN — ACETYLCYSTEINE SCH MG: 200 SOLUTION ORAL; RESPIRATORY (INHALATION) at 22:08

## 2020-11-24 RX ADMIN — SODIUM CHLORIDE SCH MLS/HR: 0.9 INJECTION, SOLUTION INTRAVENOUS at 22:11

## 2020-11-24 RX ADMIN — Medication SCH MG: at 22:09

## 2020-11-24 RX ADMIN — ENOXAPARIN SODIUM SCH MG: 30 INJECTION SUBCUTANEOUS at 09:00

## 2020-11-24 RX ADMIN — PANTOPRAZOLE SODIUM SCH MG: 40 TABLET, DELAYED RELEASE ORAL at 09:00

## 2020-11-24 RX ADMIN — ATENOLOL SCH MG: 25 TABLET ORAL at 22:07

## 2020-11-24 RX ADMIN — SODIUM CHLORIDE SCH UNIT: 9 INJECTION, SOLUTION INTRAVENOUS at 21:00

## 2020-11-24 RX ADMIN — ATORVASTATIN CALCIUM SCH MG: 40 TABLET, FILM COATED ORAL at 22:06

## 2020-11-24 RX ADMIN — PIPERACILLIN SODIUM AND TAZOBACTAM SODIUM SCH MLS/HR: .375; 3 INJECTION, POWDER, LYOPHILIZED, FOR SOLUTION INTRAVENOUS at 06:31

## 2020-11-24 RX ADMIN — SODIUM CHLORIDE SCH MLS/HR: 0.9 INJECTION, SOLUTION INTRAVENOUS at 01:00

## 2020-11-24 RX ADMIN — Medication SCH MG: at 09:00

## 2020-11-24 RX ADMIN — SODIUM CHLORIDE SCH UNIT: 9 INJECTION, SOLUTION INTRAVENOUS at 16:19

## 2020-11-24 RX ADMIN — ASPIRIN TAB DELAYED RELEASE 81 MG SCH MG: 81 TABLET DELAYED RESPONSE at 09:00

## 2020-11-24 RX ADMIN — SODIUM CHLORIDE SCH UNIT: 9 INJECTION, SOLUTION INTRAVENOUS at 07:30

## 2020-11-24 RX ADMIN — INSULIN GLARGINE SCH UNITS: 100 INJECTION, SOLUTION SUBCUTANEOUS at 10:05

## 2020-11-24 RX ADMIN — ATENOLOL SCH MG: 25 TABLET ORAL at 09:58

## 2020-11-24 RX ADMIN — PIPERACILLIN SODIUM AND TAZOBACTAM SODIUM SCH MLS/HR: .375; 3 INJECTION, POWDER, LYOPHILIZED, FOR SOLUTION INTRAVENOUS at 22:10

## 2020-11-24 RX ADMIN — SODIUM CHLORIDE SCH UNIT: 9 INJECTION, SOLUTION INTRAVENOUS at 11:30

## 2020-11-24 RX ADMIN — SODIUM CHLORIDE SCH MLS/HR: 0.9 INJECTION, SOLUTION INTRAVENOUS at 10:23

## 2020-11-24 RX ADMIN — LISINOPRIL SCH MG: 5 TABLET ORAL at 09:00

## 2020-11-24 RX ADMIN — PIPERACILLIN SODIUM AND TAZOBACTAM SODIUM SCH MLS/HR: .375; 3 INJECTION, POWDER, LYOPHILIZED, FOR SOLUTION INTRAVENOUS at 12:22

## 2020-11-24 RX ADMIN — CLOPIDOGREL BISULFATE SCH MG: 75 TABLET, FILM COATED ORAL at 09:00

## 2020-11-24 RX ADMIN — ACETYLCYSTEINE SCH MG: 200 SOLUTION ORAL; RESPIRATORY (INHALATION) at 09:00

## 2020-11-25 VITALS — SYSTOLIC BLOOD PRESSURE: 148 MMHG | DIASTOLIC BLOOD PRESSURE: 89 MMHG

## 2020-11-25 VITALS — DIASTOLIC BLOOD PRESSURE: 80 MMHG | SYSTOLIC BLOOD PRESSURE: 129 MMHG

## 2020-11-25 VITALS — SYSTOLIC BLOOD PRESSURE: 99 MMHG | DIASTOLIC BLOOD PRESSURE: 62 MMHG

## 2020-11-25 VITALS — SYSTOLIC BLOOD PRESSURE: 122 MMHG | DIASTOLIC BLOOD PRESSURE: 78 MMHG

## 2020-11-25 VITALS — SYSTOLIC BLOOD PRESSURE: 108 MMHG | DIASTOLIC BLOOD PRESSURE: 54 MMHG

## 2020-11-25 VITALS — SYSTOLIC BLOOD PRESSURE: 140 MMHG | DIASTOLIC BLOOD PRESSURE: 80 MMHG

## 2020-11-25 VITALS — SYSTOLIC BLOOD PRESSURE: 128 MMHG | DIASTOLIC BLOOD PRESSURE: 80 MMHG

## 2020-11-25 VITALS — SYSTOLIC BLOOD PRESSURE: 112 MMHG | DIASTOLIC BLOOD PRESSURE: 69 MMHG

## 2020-11-25 VITALS — SYSTOLIC BLOOD PRESSURE: 130 MMHG | DIASTOLIC BLOOD PRESSURE: 69 MMHG

## 2020-11-25 VITALS — SYSTOLIC BLOOD PRESSURE: 165 MMHG | DIASTOLIC BLOOD PRESSURE: 102 MMHG

## 2020-11-25 LAB
BASOPHILS NFR BLD AUTO: 0.4 % (ref 0–5)
BUN SERPL-MCNC: 12 MG/DL (ref 7–18)
CHLORIDE SERPL-SCNC: 105 MMOL/L (ref 101–111)
CO2 SERPL-SCNC: 25 MMOL/L (ref 21–32)
CREAT SERPL-MCNC: 1.2 MG/DL (ref 0.5–1.5)
EOSINOPHIL NFR BLD AUTO: 1 % (ref 0–8)
ERYTHROCYTE [DISTWIDTH] IN BLOOD BY AUTOMATED COUNT: 13.1 % (ref 11–15.5)
GFR SERPL CREATININE-BSD FRML MDRD: 66 ML/MIN (ref 60–?)
GLUCOSE SERPL-MCNC: 272 MG/DL (ref 70–105)
HCT VFR BLD AUTO: 38.6 % (ref 42–54)
LYMPHOCYTES NFR SPEC AUTO: 23.5 % (ref 21–51)
MCH RBC QN AUTO: 33.4 PG (ref 27–33)
MCHC RBC AUTO-ENTMCNC: 34.7 G/DL (ref 32–36)
MCV RBC AUTO: 96.3 FL (ref 79–99)
MONOCYTES NFR BLD AUTO: 9.9 % (ref 3–13)
NEUTROPHILS NFR BLD AUTO: 64.8 % (ref 40–77)
NRBC BLD MANUAL-RTO: 0 % (ref 0–0.19)
PLATELET # BLD AUTO: 129 K/UL (ref 130–400)
POTASSIUM SERPL-SCNC: 3.8 MMOL/L (ref 3.5–5.1)
RBC # BLD AUTO: 4.01 MIL/UL (ref 4.5–6.2)
SODIUM SERPL-SCNC: 137 MMOL/L (ref 136–145)
WBC # BLD AUTO: 5.2 K/UL (ref 4.8–10.8)

## 2020-11-25 RX ADMIN — CLOPIDOGREL BISULFATE SCH MG: 75 TABLET, FILM COATED ORAL at 08:10

## 2020-11-25 RX ADMIN — PANTOPRAZOLE SODIUM SCH MG: 40 TABLET, DELAYED RELEASE ORAL at 08:11

## 2020-11-25 RX ADMIN — WATER PRN GM: 1 INJECTION INTRAVENOUS at 02:52

## 2020-11-25 RX ADMIN — SODIUM CHLORIDE SCH UNIT: 9 INJECTION, SOLUTION INTRAVENOUS at 17:17

## 2020-11-25 RX ADMIN — SODIUM CHLORIDE SCH UNIT: 9 INJECTION, SOLUTION INTRAVENOUS at 11:44

## 2020-11-25 RX ADMIN — Medication SCH MG: at 08:11

## 2020-11-25 RX ADMIN — PIPERACILLIN SODIUM AND TAZOBACTAM SODIUM SCH MLS/HR: .375; 3 INJECTION, POWDER, LYOPHILIZED, FOR SOLUTION INTRAVENOUS at 20:29

## 2020-11-25 RX ADMIN — SODIUM CHLORIDE SCH MLS/HR: 0.9 INJECTION, SOLUTION INTRAVENOUS at 07:00

## 2020-11-25 RX ADMIN — PIPERACILLIN SODIUM AND TAZOBACTAM SODIUM SCH MLS/HR: .375; 3 INJECTION, POWDER, LYOPHILIZED, FOR SOLUTION INTRAVENOUS at 11:53

## 2020-11-25 RX ADMIN — ATORVASTATIN CALCIUM SCH MG: 40 TABLET, FILM COATED ORAL at 20:28

## 2020-11-25 RX ADMIN — Medication SCH MG: at 09:00

## 2020-11-25 RX ADMIN — SODIUM CHLORIDE SCH UNIT: 9 INJECTION, SOLUTION INTRAVENOUS at 11:43

## 2020-11-25 RX ADMIN — PIPERACILLIN SODIUM AND TAZOBACTAM SODIUM SCH MLS/HR: .375; 3 INJECTION, POWDER, LYOPHILIZED, FOR SOLUTION INTRAVENOUS at 05:42

## 2020-11-25 RX ADMIN — ATENOLOL SCH MG: 25 TABLET ORAL at 08:11

## 2020-11-25 RX ADMIN — ENOXAPARIN SODIUM SCH MG: 30 INJECTION SUBCUTANEOUS at 08:17

## 2020-11-25 RX ADMIN — ATENOLOL SCH MG: 25 TABLET ORAL at 20:28

## 2020-11-25 RX ADMIN — SODIUM CHLORIDE SCH UNIT: 9 INJECTION, SOLUTION INTRAVENOUS at 21:55

## 2020-11-25 RX ADMIN — ASPIRIN TAB DELAYED RELEASE 81 MG SCH MG: 81 TABLET DELAYED RESPONSE at 08:10

## 2020-11-25 RX ADMIN — Medication SCH MG: at 09:19

## 2020-11-25 RX ADMIN — SODIUM CHLORIDE SCH UNIT: 9 INJECTION, SOLUTION INTRAVENOUS at 05:41

## 2020-11-25 RX ADMIN — LISINOPRIL SCH MG: 5 TABLET ORAL at 08:12

## 2020-11-25 RX ADMIN — ACETYLCYSTEINE SCH MG: 200 SOLUTION ORAL; RESPIRATORY (INHALATION) at 20:29

## 2020-11-25 RX ADMIN — SODIUM CHLORIDE SCH UNIT: 9 INJECTION, SOLUTION INTRAVENOUS at 16:30

## 2020-11-25 RX ADMIN — ACETYLCYSTEINE SCH MG: 200 SOLUTION ORAL; RESPIRATORY (INHALATION) at 08:15

## 2020-11-25 RX ADMIN — SODIUM CHLORIDE SCH MLS/HR: 0.9 INJECTION, SOLUTION INTRAVENOUS at 17:16

## 2020-11-25 RX ADMIN — INSULIN GLARGINE SCH UNITS: 100 INJECTION, SOLUTION SUBCUTANEOUS at 08:13

## 2020-11-25 RX ADMIN — SODIUM CHLORIDE SCH UNIT: 9 INJECTION, SOLUTION INTRAVENOUS at 08:14

## 2020-11-25 NOTE — NUR
ASSESSMENT NOTE

PATIENT AWAKE ALERT OX3, NO SOB NO C/O PAIN AT THIS TIME, TEACH PATIENT PLAN OF CARE AND 
EXPECTED OUTCOME, PATIENT VERBALIZES UNDERSTANDING VIA TEACH BACK

## 2020-11-26 VITALS — DIASTOLIC BLOOD PRESSURE: 111 MMHG | SYSTOLIC BLOOD PRESSURE: 202 MMHG

## 2020-11-26 VITALS — DIASTOLIC BLOOD PRESSURE: 101 MMHG | SYSTOLIC BLOOD PRESSURE: 203 MMHG

## 2020-11-26 VITALS — DIASTOLIC BLOOD PRESSURE: 85 MMHG | SYSTOLIC BLOOD PRESSURE: 144 MMHG

## 2020-11-26 VITALS — DIASTOLIC BLOOD PRESSURE: 98 MMHG | SYSTOLIC BLOOD PRESSURE: 154 MMHG

## 2020-11-26 VITALS — SYSTOLIC BLOOD PRESSURE: 162 MMHG | DIASTOLIC BLOOD PRESSURE: 98 MMHG

## 2020-11-26 VITALS — SYSTOLIC BLOOD PRESSURE: 150 MMHG | DIASTOLIC BLOOD PRESSURE: 92 MMHG

## 2020-11-26 LAB
ALBUMIN SERPL-MCNC: 3.1 G/DL (ref 3.5–5)
ALT SERPL-CCNC: 30 U/L (ref 12–78)
AST SERPL-CCNC: 24 U/L (ref 10–37)
BILIRUB SERPL-MCNC: 0.8 MG/DL (ref 0.2–1)
BUN SERPL-MCNC: 17 MG/DL (ref 7–18)
CHLORIDE SERPL-SCNC: 108 MMOL/L (ref 101–111)
CO2 SERPL-SCNC: 26 MMOL/L (ref 21–32)
CREAT SERPL-MCNC: 1.3 MG/DL (ref 0.5–1.5)
ERYTHROCYTE [DISTWIDTH] IN BLOOD BY AUTOMATED COUNT: 13.3 % (ref 11–15.5)
GFR SERPL CREATININE-BSD FRML MDRD: 60 ML/MIN (ref 60–?)
GLUCOSE SERPL-MCNC: 184 MG/DL (ref 70–105)
GLUCOSE UR STRIP-MCNC: >=1000 MG/DL
HCT VFR BLD AUTO: 38.3 % (ref 42–54)
MCH RBC QN AUTO: 32.6 PG (ref 27–33)
MCHC RBC AUTO-ENTMCNC: 33.7 G/DL (ref 32–36)
MCV RBC AUTO: 96.7 FL (ref 79–99)
NRBC BLD MANUAL-RTO: 0 % (ref 0–0.19)
PH UR STRIP: 5.5 [PH] (ref 5–8)
PLATELET # BLD AUTO: 113 K/UL (ref 130–400)
POTASSIUM SERPL-SCNC: 4.1 MMOL/L (ref 3.5–5.1)
PROT SERPL-MCNC: 6.9 G/DL (ref 6–8.3)
RBC # BLD AUTO: 3.96 MIL/UL (ref 4.5–6.2)
SODIUM SERPL-SCNC: 140 MMOL/L (ref 136–145)
SP GR UR STRIP: 1.04 (ref 1–1.03)
UROBILINOGEN UR STRIP-MCNC: 1 MG/DL (ref 0.2–1)
WBC # BLD AUTO: 5.4 K/UL (ref 4.8–10.8)

## 2020-11-26 RX ADMIN — PIPERACILLIN SODIUM AND TAZOBACTAM SODIUM SCH MLS/HR: .375; 3 INJECTION, POWDER, LYOPHILIZED, FOR SOLUTION INTRAVENOUS at 20:19

## 2020-11-26 RX ADMIN — SODIUM CHLORIDE SCH MLS/HR: 0.9 INJECTION, SOLUTION INTRAVENOUS at 03:58

## 2020-11-26 RX ADMIN — HYDRALAZINE HYDROCHLORIDE SCH MG: 25 TABLET ORAL at 20:18

## 2020-11-26 RX ADMIN — SODIUM CHLORIDE SCH MLS/HR: 0.9 INJECTION, SOLUTION INTRAVENOUS at 13:00

## 2020-11-26 RX ADMIN — INSULIN GLARGINE SCH UNITS: 100 INJECTION, SOLUTION SUBCUTANEOUS at 09:00

## 2020-11-26 RX ADMIN — INSULIN GLARGINE SCH UNITS: 100 INJECTION, SOLUTION SUBCUTANEOUS at 07:23

## 2020-11-26 RX ADMIN — WATER PRN GM: 1 INJECTION INTRAVENOUS at 23:08

## 2020-11-26 RX ADMIN — SODIUM CHLORIDE SCH UNIT: 9 INJECTION, SOLUTION INTRAVENOUS at 16:30

## 2020-11-26 RX ADMIN — ATORVASTATIN CALCIUM SCH MG: 40 TABLET, FILM COATED ORAL at 20:19

## 2020-11-26 RX ADMIN — LISINOPRIL SCH MG: 10 TABLET ORAL at 09:10

## 2020-11-26 RX ADMIN — Medication SCH MG: at 09:10

## 2020-11-26 RX ADMIN — SODIUM CHLORIDE SCH UNIT: 9 INJECTION, SOLUTION INTRAVENOUS at 11:22

## 2020-11-26 RX ADMIN — SODIUM CHLORIDE SCH UNIT: 9 INJECTION, SOLUTION INTRAVENOUS at 07:24

## 2020-11-26 RX ADMIN — PIPERACILLIN SODIUM AND TAZOBACTAM SODIUM SCH MLS/HR: .375; 3 INJECTION, POWDER, LYOPHILIZED, FOR SOLUTION INTRAVENOUS at 11:24

## 2020-11-26 RX ADMIN — ATENOLOL SCH MG: 25 TABLET ORAL at 20:18

## 2020-11-26 RX ADMIN — ENOXAPARIN SODIUM SCH MG: 30 INJECTION SUBCUTANEOUS at 09:12

## 2020-11-26 RX ADMIN — SODIUM CHLORIDE SCH UNIT: 9 INJECTION, SOLUTION INTRAVENOUS at 07:25

## 2020-11-26 RX ADMIN — ASPIRIN TAB DELAYED RELEASE 81 MG SCH MG: 81 TABLET DELAYED RESPONSE at 09:10

## 2020-11-26 RX ADMIN — SODIUM CHLORIDE SCH UNIT: 9 INJECTION, SOLUTION INTRAVENOUS at 21:00

## 2020-11-26 RX ADMIN — PANTOPRAZOLE SODIUM SCH MG: 40 TABLET, DELAYED RELEASE ORAL at 09:11

## 2020-11-26 RX ADMIN — ATENOLOL SCH MG: 25 TABLET ORAL at 09:11

## 2020-11-26 RX ADMIN — SODIUM CHLORIDE SCH UNIT: 9 INJECTION, SOLUTION INTRAVENOUS at 16:16

## 2020-11-26 RX ADMIN — HYDRALAZINE HYDROCHLORIDE SCH MG: 25 TABLET ORAL at 16:30

## 2020-11-26 RX ADMIN — ACETYLCYSTEINE SCH MG: 200 SOLUTION ORAL; RESPIRATORY (INHALATION) at 16:06

## 2020-11-26 RX ADMIN — ACETYLCYSTEINE SCH MG: 200 SOLUTION ORAL; RESPIRATORY (INHALATION) at 20:19

## 2020-11-26 RX ADMIN — PIPERACILLIN SODIUM AND TAZOBACTAM SODIUM SCH MLS/HR: .375; 3 INJECTION, POWDER, LYOPHILIZED, FOR SOLUTION INTRAVENOUS at 03:58

## 2020-11-27 VITALS — DIASTOLIC BLOOD PRESSURE: 108 MMHG | SYSTOLIC BLOOD PRESSURE: 125 MMHG

## 2020-11-27 VITALS — SYSTOLIC BLOOD PRESSURE: 147 MMHG | DIASTOLIC BLOOD PRESSURE: 86 MMHG

## 2020-11-27 VITALS — DIASTOLIC BLOOD PRESSURE: 88 MMHG | SYSTOLIC BLOOD PRESSURE: 147 MMHG

## 2020-11-27 VITALS — SYSTOLIC BLOOD PRESSURE: 153 MMHG | DIASTOLIC BLOOD PRESSURE: 76 MMHG

## 2020-11-27 VITALS — SYSTOLIC BLOOD PRESSURE: 130 MMHG | DIASTOLIC BLOOD PRESSURE: 77 MMHG

## 2020-11-27 VITALS — SYSTOLIC BLOOD PRESSURE: 171 MMHG | DIASTOLIC BLOOD PRESSURE: 103 MMHG

## 2020-11-27 VITALS — DIASTOLIC BLOOD PRESSURE: 94 MMHG | SYSTOLIC BLOOD PRESSURE: 160 MMHG

## 2020-11-27 LAB
BASOPHILS NFR BLD AUTO: 0.5 % (ref 0–5)
BUN SERPL-MCNC: 11 MG/DL (ref 7–18)
CHLORIDE SERPL-SCNC: 106 MMOL/L (ref 101–111)
CO2 SERPL-SCNC: 26 MMOL/L (ref 21–32)
CREAT SERPL-MCNC: 1 MG/DL (ref 0.5–1.5)
EOSINOPHIL NFR BLD AUTO: 1.1 % (ref 0–8)
ERYTHROCYTE [DISTWIDTH] IN BLOOD BY AUTOMATED COUNT: 13.3 % (ref 11–15.5)
GFR SERPL CREATININE-BSD FRML MDRD: 81 ML/MIN (ref 60–?)
GLUCOSE SERPL-MCNC: 162 MG/DL (ref 70–105)
HCT VFR BLD AUTO: 38.3 % (ref 42–54)
LYMPHOCYTES NFR SPEC AUTO: 24.6 % (ref 21–51)
MCH RBC QN AUTO: 33.2 PG (ref 27–33)
MCHC RBC AUTO-ENTMCNC: 34.5 G/DL (ref 32–36)
MCV RBC AUTO: 96.2 FL (ref 79–99)
MONOCYTES NFR BLD AUTO: 12.1 % (ref 3–13)
NEUTROPHILS NFR BLD AUTO: 61.4 % (ref 40–77)
NRBC BLD MANUAL-RTO: 0 % (ref 0–0.19)
PLATELET # BLD AUTO: 123 K/UL (ref 130–400)
POTASSIUM SERPL-SCNC: 3.5 MMOL/L (ref 3.5–5.1)
RBC # BLD AUTO: 3.98 MIL/UL (ref 4.5–6.2)
SODIUM SERPL-SCNC: 141 MMOL/L (ref 136–145)
WBC # BLD AUTO: 6.3 K/UL (ref 4.8–10.8)

## 2020-11-27 RX ADMIN — PIPERACILLIN SODIUM AND TAZOBACTAM SODIUM SCH MLS/HR: .375; 3 INJECTION, POWDER, LYOPHILIZED, FOR SOLUTION INTRAVENOUS at 03:45

## 2020-11-27 RX ADMIN — ATENOLOL SCH MG: 25 TABLET ORAL at 08:54

## 2020-11-27 RX ADMIN — SODIUM CHLORIDE SCH UNIT: 9 INJECTION, SOLUTION INTRAVENOUS at 20:08

## 2020-11-27 RX ADMIN — ATENOLOL SCH MG: 25 TABLET ORAL at 19:56

## 2020-11-27 RX ADMIN — SODIUM CHLORIDE SCH UNIT: 9 INJECTION, SOLUTION INTRAVENOUS at 05:17

## 2020-11-27 RX ADMIN — SODIUM CHLORIDE SCH UNIT: 9 INJECTION, SOLUTION INTRAVENOUS at 17:15

## 2020-11-27 RX ADMIN — LISINOPRIL SCH MG: 10 TABLET ORAL at 04:37

## 2020-11-27 RX ADMIN — ASPIRIN TAB DELAYED RELEASE 81 MG SCH MG: 81 TABLET DELAYED RESPONSE at 08:53

## 2020-11-27 RX ADMIN — ATORVASTATIN CALCIUM SCH MG: 40 TABLET, FILM COATED ORAL at 19:56

## 2020-11-27 RX ADMIN — SODIUM CHLORIDE SCH UNIT: 9 INJECTION, SOLUTION INTRAVENOUS at 16:30

## 2020-11-27 RX ADMIN — HYDRALAZINE HYDROCHLORIDE SCH MG: 25 TABLET ORAL at 20:10

## 2020-11-27 RX ADMIN — ACETYLCYSTEINE SCH MG: 200 SOLUTION ORAL; RESPIRATORY (INHALATION) at 09:00

## 2020-11-27 RX ADMIN — PIPERACILLIN SODIUM AND TAZOBACTAM SODIUM SCH MLS/HR: .375; 3 INJECTION, POWDER, LYOPHILIZED, FOR SOLUTION INTRAVENOUS at 19:55

## 2020-11-27 RX ADMIN — INSULIN GLARGINE SCH UNITS: 100 INJECTION, SOLUTION SUBCUTANEOUS at 09:10

## 2020-11-27 RX ADMIN — PANTOPRAZOLE SODIUM SCH MG: 40 TABLET, DELAYED RELEASE ORAL at 08:54

## 2020-11-27 RX ADMIN — SODIUM CHLORIDE SCH UNIT: 9 INJECTION, SOLUTION INTRAVENOUS at 11:30

## 2020-11-27 RX ADMIN — PIPERACILLIN SODIUM AND TAZOBACTAM SODIUM SCH MLS/HR: .375; 3 INJECTION, POWDER, LYOPHILIZED, FOR SOLUTION INTRAVENOUS at 12:26

## 2020-11-27 RX ADMIN — ENOXAPARIN SODIUM SCH MG: 30 INJECTION SUBCUTANEOUS at 08:54

## 2020-11-27 RX ADMIN — SODIUM CHLORIDE SCH UNIT: 9 INJECTION, SOLUTION INTRAVENOUS at 12:28

## 2020-11-27 RX ADMIN — Medication SCH MG: at 08:53

## 2020-11-27 RX ADMIN — HYDRALAZINE HYDROCHLORIDE SCH MG: 25 TABLET ORAL at 12:26

## 2020-11-27 RX ADMIN — HYDRALAZINE HYDROCHLORIDE SCH MG: 25 TABLET ORAL at 08:53

## 2020-11-27 RX ADMIN — SODIUM CHLORIDE SCH UNIT: 9 INJECTION, SOLUTION INTRAVENOUS at 06:07

## 2020-11-27 RX ADMIN — LORAZEPAM PRN MG: 0.5 TABLET ORAL at 12:27

## 2020-11-27 NOTE — NUR
wound care conducted on left 2nd toe with saline, vaseline gauze, dry gauze, kerlex

pictures taken and placed in the chart

## 2020-11-28 VITALS — DIASTOLIC BLOOD PRESSURE: 91 MMHG | SYSTOLIC BLOOD PRESSURE: 147 MMHG

## 2020-11-28 VITALS — SYSTOLIC BLOOD PRESSURE: 138 MMHG | DIASTOLIC BLOOD PRESSURE: 84 MMHG

## 2020-11-28 VITALS — SYSTOLIC BLOOD PRESSURE: 172 MMHG | DIASTOLIC BLOOD PRESSURE: 85 MMHG

## 2020-11-28 VITALS — DIASTOLIC BLOOD PRESSURE: 90 MMHG | SYSTOLIC BLOOD PRESSURE: 163 MMHG

## 2020-11-28 VITALS — DIASTOLIC BLOOD PRESSURE: 91 MMHG | SYSTOLIC BLOOD PRESSURE: 143 MMHG

## 2020-11-28 LAB
BASOPHILS NFR BLD AUTO: 0.4 % (ref 0–5)
BUN SERPL-MCNC: 12 MG/DL (ref 7–18)
CHLORIDE SERPL-SCNC: 104 MMOL/L (ref 101–111)
CO2 SERPL-SCNC: 26 MMOL/L (ref 21–32)
CREAT SERPL-MCNC: 1.1 MG/DL (ref 0.5–1.5)
EOSINOPHIL NFR BLD AUTO: 1.6 % (ref 0–8)
ERYTHROCYTE [DISTWIDTH] IN BLOOD BY AUTOMATED COUNT: 13.7 % (ref 11–15.5)
GFR SERPL CREATININE-BSD FRML MDRD: 73 ML/MIN (ref 60–?)
GLUCOSE SERPL-MCNC: 178 MG/DL (ref 70–105)
HCT VFR BLD AUTO: 41.7 % (ref 42–54)
LYMPHOCYTES NFR SPEC AUTO: 28.2 % (ref 21–51)
MCH RBC QN AUTO: 33.2 PG (ref 27–33)
MCHC RBC AUTO-ENTMCNC: 34.5 G/DL (ref 32–36)
MCV RBC AUTO: 96.1 FL (ref 79–99)
MONOCYTES NFR BLD AUTO: 12 % (ref 3–13)
NEUTROPHILS NFR BLD AUTO: 57.4 % (ref 40–77)
NRBC BLD MANUAL-RTO: 0 % (ref 0–0.19)
PLATELET # BLD AUTO: 127 K/UL (ref 130–400)
POTASSIUM SERPL-SCNC: 3.7 MMOL/L (ref 3.5–5.1)
RBC # BLD AUTO: 4.34 MIL/UL (ref 4.5–6.2)
SODIUM SERPL-SCNC: 139 MMOL/L (ref 136–145)
WBC # BLD AUTO: 5.7 K/UL (ref 4.8–10.8)

## 2020-11-28 RX ADMIN — SODIUM CHLORIDE SCH UNIT: 9 INJECTION, SOLUTION INTRAVENOUS at 21:00

## 2020-11-28 RX ADMIN — ATENOLOL SCH MG: 25 TABLET ORAL at 08:44

## 2020-11-28 RX ADMIN — SODIUM CHLORIDE SCH UNIT: 9 INJECTION, SOLUTION INTRAVENOUS at 12:29

## 2020-11-28 RX ADMIN — ASPIRIN TAB DELAYED RELEASE 81 MG SCH MG: 81 TABLET DELAYED RESPONSE at 08:43

## 2020-11-28 RX ADMIN — ENOXAPARIN SODIUM SCH MG: 30 INJECTION SUBCUTANEOUS at 08:44

## 2020-11-28 RX ADMIN — LORAZEPAM PRN MG: 0.5 TABLET ORAL at 20:32

## 2020-11-28 RX ADMIN — PIPERACILLIN SODIUM AND TAZOBACTAM SODIUM SCH MLS/HR: .375; 3 INJECTION, POWDER, LYOPHILIZED, FOR SOLUTION INTRAVENOUS at 04:56

## 2020-11-28 RX ADMIN — HYDRALAZINE HYDROCHLORIDE SCH MG: 25 TABLET ORAL at 12:26

## 2020-11-28 RX ADMIN — SODIUM CHLORIDE SCH UNIT: 9 INJECTION, SOLUTION INTRAVENOUS at 17:24

## 2020-11-28 RX ADMIN — ATENOLOL SCH MG: 25 TABLET ORAL at 20:32

## 2020-11-28 RX ADMIN — HYDRALAZINE HYDROCHLORIDE SCH MG: 25 TABLET ORAL at 20:32

## 2020-11-28 RX ADMIN — HYDRALAZINE HYDROCHLORIDE SCH MG: 25 TABLET ORAL at 08:43

## 2020-11-28 RX ADMIN — SODIUM CHLORIDE SCH UNIT: 9 INJECTION, SOLUTION INTRAVENOUS at 17:23

## 2020-11-28 RX ADMIN — PANTOPRAZOLE SODIUM SCH MG: 40 TABLET, DELAYED RELEASE ORAL at 08:43

## 2020-11-28 RX ADMIN — ATORVASTATIN CALCIUM SCH MG: 40 TABLET, FILM COATED ORAL at 20:33

## 2020-11-28 RX ADMIN — LISINOPRIL SCH MG: 20 TABLET ORAL at 08:44

## 2020-11-28 RX ADMIN — Medication SCH MG: at 08:43

## 2020-11-28 RX ADMIN — LORAZEPAM PRN MG: 0.5 TABLET ORAL at 08:43

## 2020-11-28 RX ADMIN — PIPERACILLIN SODIUM AND TAZOBACTAM SODIUM SCH MLS/HR: .375; 3 INJECTION, POWDER, LYOPHILIZED, FOR SOLUTION INTRAVENOUS at 12:26

## 2020-11-28 RX ADMIN — INSULIN GLARGINE SCH UNITS: 100 INJECTION, SOLUTION SUBCUTANEOUS at 08:59

## 2020-11-28 RX ADMIN — SODIUM CHLORIDE SCH UNIT: 9 INJECTION, SOLUTION INTRAVENOUS at 12:28

## 2020-11-28 RX ADMIN — SODIUM CHLORIDE SCH UNIT: 9 INJECTION, SOLUTION INTRAVENOUS at 05:53

## 2020-11-28 RX ADMIN — SODIUM CHLORIDE SCH UNIT: 9 INJECTION, SOLUTION INTRAVENOUS at 09:04

## 2020-11-29 VITALS — SYSTOLIC BLOOD PRESSURE: 164 MMHG | DIASTOLIC BLOOD PRESSURE: 96 MMHG

## 2020-11-29 VITALS — SYSTOLIC BLOOD PRESSURE: 160 MMHG | DIASTOLIC BLOOD PRESSURE: 93 MMHG

## 2020-11-29 VITALS — SYSTOLIC BLOOD PRESSURE: 157 MMHG | DIASTOLIC BLOOD PRESSURE: 87 MMHG

## 2020-11-29 VITALS — DIASTOLIC BLOOD PRESSURE: 96 MMHG | SYSTOLIC BLOOD PRESSURE: 175 MMHG

## 2020-11-29 LAB
BUN SERPL-MCNC: 17 MG/DL (ref 7–18)
CHLORIDE SERPL-SCNC: 105 MMOL/L (ref 101–111)
CO2 SERPL-SCNC: 26 MMOL/L (ref 21–32)
CREAT SERPL-MCNC: 1 MG/DL (ref 0.5–1.5)
GFR SERPL CREATININE-BSD FRML MDRD: 81 ML/MIN (ref 60–?)
GLUCOSE SERPL-MCNC: 180 MG/DL (ref 70–105)
POTASSIUM SERPL-SCNC: 3.7 MMOL/L (ref 3.5–5.1)
SODIUM SERPL-SCNC: 138 MMOL/L (ref 136–145)

## 2020-11-29 RX ADMIN — Medication SCH MG: at 09:00

## 2020-11-29 RX ADMIN — HYDRALAZINE HYDROCHLORIDE SCH MG: 25 TABLET ORAL at 09:00

## 2020-11-29 RX ADMIN — ENOXAPARIN SODIUM SCH MG: 30 INJECTION SUBCUTANEOUS at 09:00

## 2020-11-29 RX ADMIN — PANTOPRAZOLE SODIUM SCH MG: 40 TABLET, DELAYED RELEASE ORAL at 09:00

## 2020-11-29 RX ADMIN — SODIUM CHLORIDE SCH UNIT: 9 INJECTION, SOLUTION INTRAVENOUS at 13:53

## 2020-11-29 RX ADMIN — LISINOPRIL SCH MG: 20 TABLET ORAL at 09:00

## 2020-11-29 RX ADMIN — SODIUM CHLORIDE SCH UNIT: 9 INJECTION, SOLUTION INTRAVENOUS at 06:02

## 2020-11-29 RX ADMIN — ATENOLOL SCH MG: 25 TABLET ORAL at 09:00

## 2020-11-29 RX ADMIN — HYDRALAZINE HYDROCHLORIDE SCH MG: 25 TABLET ORAL at 13:49

## 2020-11-29 RX ADMIN — SODIUM CHLORIDE SCH UNIT: 9 INJECTION, SOLUTION INTRAVENOUS at 16:37

## 2020-11-29 RX ADMIN — INSULIN GLARGINE SCH UNITS: 100 INJECTION, SOLUTION SUBCUTANEOUS at 09:00

## 2020-11-29 RX ADMIN — ASPIRIN TAB DELAYED RELEASE 81 MG SCH MG: 81 TABLET DELAYED RESPONSE at 09:00

## 2020-11-29 RX ADMIN — SODIUM CHLORIDE SCH UNIT: 9 INJECTION, SOLUTION INTRAVENOUS at 16:30

## 2020-12-01 NOTE — NUR
Transitional Care - Post Discharge Note



Spoke with patient's wife at number listed. As per Mrs Bautista, Mr Bautista is doing well. She 
states that he has scheduled all follow up appointments, and is taking discharge medications 
as ordered. Mrs Bautista states that Dr Maurice is to perform same procedure on other leg in a 
couple of weeks. 

-------------------------------------------------------------------------------

Addendum: 12/01/20 at 1623 by JACKIE LEBLANC

-------------------------------------------------------------------------------

Amended: Links added.

## 2020-12-09 ENCOUNTER — HOSPITAL ENCOUNTER (OUTPATIENT)
Dept: HOSPITAL 90 - WHH | Age: 60
Discharge: HOME | End: 2020-12-09
Attending: PODIATRIST
Payer: COMMERCIAL

## 2020-12-09 DIAGNOSIS — E78.5: ICD-10-CM

## 2020-12-09 DIAGNOSIS — E11.42: ICD-10-CM

## 2020-12-09 DIAGNOSIS — T87.89: Primary | ICD-10-CM

## 2020-12-09 DIAGNOSIS — E11.22: ICD-10-CM

## 2020-12-09 DIAGNOSIS — L57.0: ICD-10-CM

## 2020-12-09 DIAGNOSIS — E11.69: ICD-10-CM

## 2020-12-09 DIAGNOSIS — N18.2: ICD-10-CM

## 2020-12-09 DIAGNOSIS — Z88.6: ICD-10-CM

## 2020-12-09 DIAGNOSIS — Z88.5: ICD-10-CM
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
## 2020-12-09 DIAGNOSIS — M86.672: ICD-10-CM

## 2020-12-09 DIAGNOSIS — M86.30: ICD-10-CM

## 2020-12-09 DIAGNOSIS — E11.319: ICD-10-CM

## 2020-12-09 DIAGNOSIS — L97.521: ICD-10-CM

## 2020-12-09 DIAGNOSIS — E11.621: ICD-10-CM

## 2020-12-09 DIAGNOSIS — I25.10: ICD-10-CM

## 2020-12-09 DIAGNOSIS — I96: ICD-10-CM

## 2020-12-09 DIAGNOSIS — E11.52: ICD-10-CM

## 2020-12-09 DIAGNOSIS — I50.9: ICD-10-CM

## 2020-12-09 DIAGNOSIS — I13.0: ICD-10-CM

## 2020-12-09 DIAGNOSIS — L84: ICD-10-CM

## 2020-12-09 DIAGNOSIS — M19.072: ICD-10-CM

## 2020-12-09 DIAGNOSIS — K21.9: ICD-10-CM

## 2020-12-09 LAB
APTT PPP: 25.9 SEC (ref 26.3–35.5)
BASOPHILS NFR BLD AUTO: 0.4 % (ref 0–5)
BUN SERPL-MCNC: 22 MG/DL (ref 7–18)
CHLORIDE SERPL-SCNC: 101 MMOL/L (ref 101–111)
CO2 SERPL-SCNC: 30 MMOL/L (ref 21–32)
CREAT SERPL-MCNC: 1.3 MG/DL (ref 0.5–1.5)
DEPRECATED SQUAMOUS URNS QL MICRO: (no result) /HPF (ref 0–2)
EOSINOPHIL NFR BLD AUTO: 1.5 % (ref 0–8)
ERYTHROCYTE [DISTWIDTH] IN BLOOD BY AUTOMATED COUNT: 14 % (ref 11–15.5)
GFR SERPL CREATININE-BSD FRML MDRD: 60 ML/MIN (ref 60–?)
GLUCOSE SERPL-MCNC: 261 MG/DL (ref 70–105)
GLUCOSE UR STRIP-MCNC: >=1000 MG/DL
HCT VFR BLD AUTO: 40.9 % (ref 42–54)
INR PPP: 0.98 (ref 0.85–1.15)
LYMPHOCYTES NFR SPEC AUTO: 28 % (ref 21–51)
MCH RBC QN AUTO: 33.2 PG (ref 27–33)
MCHC RBC AUTO-ENTMCNC: 33.3 G/DL (ref 32–36)
MCV RBC AUTO: 99.8 FL (ref 79–99)
MONOCYTES NFR BLD AUTO: 13.5 % (ref 3–13)
NEUTROPHILS NFR BLD AUTO: 56.2 % (ref 40–77)
NRBC BLD MANUAL-RTO: 0 % (ref 0–0.19)
PH UR STRIP: 5.5 [PH] (ref 5–8)
PLATELET # BLD AUTO: 135 K/UL (ref 130–400)
POTASSIUM SERPL-SCNC: 4.7 MMOL/L (ref 3.5–5.1)
PROTHROMBIN TIME: 10.6 SEC (ref 9.6–11.6)
RBC # BLD AUTO: 4.1 MIL/UL (ref 4.5–6.2)
RBC #/AREA URNS HPF: (no result) /HPF (ref 0–1)
SODIUM SERPL-SCNC: 137 MMOL/L (ref 136–145)
SP GR UR STRIP: 1.03 (ref 1–1.03)
UROBILINOGEN UR STRIP-MCNC: 1 MG/DL (ref 0.2–1)
WBC # BLD AUTO: 5.4 K/UL (ref 4.8–10.8)

## 2020-12-09 PROCEDURE — 99214 OFFICE O/P EST MOD 30 MIN: CPT

## 2020-12-10 VITALS — SYSTOLIC BLOOD PRESSURE: 131 MMHG | DIASTOLIC BLOOD PRESSURE: 71 MMHG

## 2020-12-11 ENCOUNTER — HOSPITAL ENCOUNTER (OUTPATIENT)
Dept: HOSPITAL 90 - DAH | Age: 60
Setting detail: OBSERVATION
LOS: 1 days | Discharge: HOME | End: 2020-12-12
Attending: INTERNAL MEDICINE | Admitting: INTERNAL MEDICINE
Payer: COMMERCIAL

## 2020-12-11 VITALS — SYSTOLIC BLOOD PRESSURE: 163 MMHG | DIASTOLIC BLOOD PRESSURE: 87 MMHG

## 2020-12-11 VITALS — SYSTOLIC BLOOD PRESSURE: 151 MMHG | DIASTOLIC BLOOD PRESSURE: 86 MMHG

## 2020-12-11 VITALS — DIASTOLIC BLOOD PRESSURE: 87 MMHG | SYSTOLIC BLOOD PRESSURE: 179 MMHG

## 2020-12-11 VITALS — DIASTOLIC BLOOD PRESSURE: 83 MMHG | SYSTOLIC BLOOD PRESSURE: 153 MMHG

## 2020-12-11 VITALS — SYSTOLIC BLOOD PRESSURE: 173 MMHG | DIASTOLIC BLOOD PRESSURE: 85 MMHG

## 2020-12-11 VITALS — BODY MASS INDEX: 28.35 KG/M2 | WEIGHT: 191.4 LBS | HEIGHT: 69 IN

## 2020-12-11 VITALS — SYSTOLIC BLOOD PRESSURE: 166 MMHG | DIASTOLIC BLOOD PRESSURE: 94 MMHG

## 2020-12-11 VITALS — DIASTOLIC BLOOD PRESSURE: 80 MMHG | SYSTOLIC BLOOD PRESSURE: 135 MMHG

## 2020-12-11 VITALS — SYSTOLIC BLOOD PRESSURE: 147 MMHG | DIASTOLIC BLOOD PRESSURE: 80 MMHG

## 2020-12-11 VITALS — DIASTOLIC BLOOD PRESSURE: 81 MMHG | SYSTOLIC BLOOD PRESSURE: 151 MMHG

## 2020-12-11 VITALS — SYSTOLIC BLOOD PRESSURE: 159 MMHG | DIASTOLIC BLOOD PRESSURE: 85 MMHG

## 2020-12-11 VITALS — DIASTOLIC BLOOD PRESSURE: 86 MMHG | SYSTOLIC BLOOD PRESSURE: 164 MMHG

## 2020-12-11 VITALS — SYSTOLIC BLOOD PRESSURE: 153 MMHG | DIASTOLIC BLOOD PRESSURE: 92 MMHG

## 2020-12-11 VITALS — DIASTOLIC BLOOD PRESSURE: 90 MMHG | SYSTOLIC BLOOD PRESSURE: 155 MMHG

## 2020-12-11 VITALS — SYSTOLIC BLOOD PRESSURE: 163 MMHG | DIASTOLIC BLOOD PRESSURE: 90 MMHG

## 2020-12-11 VITALS — DIASTOLIC BLOOD PRESSURE: 95 MMHG | SYSTOLIC BLOOD PRESSURE: 177 MMHG

## 2020-12-11 VITALS — SYSTOLIC BLOOD PRESSURE: 164 MMHG | DIASTOLIC BLOOD PRESSURE: 92 MMHG

## 2020-12-11 VITALS — DIASTOLIC BLOOD PRESSURE: 87 MMHG | SYSTOLIC BLOOD PRESSURE: 163 MMHG

## 2020-12-11 VITALS — DIASTOLIC BLOOD PRESSURE: 96 MMHG | SYSTOLIC BLOOD PRESSURE: 175 MMHG

## 2020-12-11 DIAGNOSIS — I10: ICD-10-CM

## 2020-12-11 DIAGNOSIS — Z95.1: ICD-10-CM

## 2020-12-11 DIAGNOSIS — Z79.899: ICD-10-CM

## 2020-12-11 DIAGNOSIS — E11.51: ICD-10-CM

## 2020-12-11 DIAGNOSIS — I25.10: Primary | ICD-10-CM

## 2020-12-11 DIAGNOSIS — I35.0: ICD-10-CM

## 2020-12-11 DIAGNOSIS — E78.5: ICD-10-CM

## 2020-12-11 PROCEDURE — 99157 MOD SED OTHER PHYS/QHP EA: CPT

## 2020-12-11 PROCEDURE — 99156 MOD SED OTH PHYS/QHP 5/>YRS: CPT

## 2020-12-11 PROCEDURE — 81001 URINALYSIS AUTO W/SCOPE: CPT

## 2020-12-11 PROCEDURE — 93005 ELECTROCARDIOGRAM TRACING: CPT

## 2020-12-11 PROCEDURE — 85610 PROTHROMBIN TIME: CPT

## 2020-12-11 PROCEDURE — 80048 BASIC METABOLIC PNL TOTAL CA: CPT

## 2020-12-11 PROCEDURE — 36415 COLL VENOUS BLD VENIPUNCTURE: CPT

## 2020-12-11 PROCEDURE — 85025 COMPLETE CBC W/AUTO DIFF WBC: CPT

## 2020-12-11 PROCEDURE — 71045 X-RAY EXAM CHEST 1 VIEW: CPT

## 2020-12-11 PROCEDURE — 83880 ASSAY OF NATRIURETIC PEPTIDE: CPT

## 2020-12-11 PROCEDURE — 82948 REAGENT STRIP/BLOOD GLUCOSE: CPT

## 2020-12-11 PROCEDURE — 93461 R&L HRT ART/VENTRICLE ANGIO: CPT

## 2020-12-11 PROCEDURE — 85730 THROMBOPLASTIN TIME PARTIAL: CPT

## 2020-12-11 PROCEDURE — 85027 COMPLETE CBC AUTOMATED: CPT

## 2020-12-12 VITALS — DIASTOLIC BLOOD PRESSURE: 78 MMHG | SYSTOLIC BLOOD PRESSURE: 130 MMHG

## 2020-12-12 VITALS — DIASTOLIC BLOOD PRESSURE: 94 MMHG | SYSTOLIC BLOOD PRESSURE: 162 MMHG

## 2020-12-12 VITALS — SYSTOLIC BLOOD PRESSURE: 165 MMHG | DIASTOLIC BLOOD PRESSURE: 90 MMHG

## 2020-12-12 VITALS — DIASTOLIC BLOOD PRESSURE: 75 MMHG | SYSTOLIC BLOOD PRESSURE: 115 MMHG

## 2020-12-12 VITALS — SYSTOLIC BLOOD PRESSURE: 158 MMHG | DIASTOLIC BLOOD PRESSURE: 86 MMHG

## 2020-12-12 VITALS — SYSTOLIC BLOOD PRESSURE: 121 MMHG | DIASTOLIC BLOOD PRESSURE: 72 MMHG

## 2020-12-12 VITALS — SYSTOLIC BLOOD PRESSURE: 145 MMHG | DIASTOLIC BLOOD PRESSURE: 84 MMHG

## 2020-12-12 VITALS — SYSTOLIC BLOOD PRESSURE: 159 MMHG | DIASTOLIC BLOOD PRESSURE: 90 MMHG

## 2020-12-12 LAB
BNP SERPL-MCNC: 304 PG/ML (ref 0–100)
BUN SERPL-MCNC: 20 MG/DL (ref 7–18)
CHLORIDE SERPL-SCNC: 103 MMOL/L (ref 101–111)
CO2 SERPL-SCNC: 28 MMOL/L (ref 21–32)
CREAT SERPL-MCNC: 1.1 MG/DL (ref 0.5–1.5)
ERYTHROCYTE [DISTWIDTH] IN BLOOD BY AUTOMATED COUNT: 13.6 % (ref 11–15.5)
GFR SERPL CREATININE-BSD FRML MDRD: 73 ML/MIN (ref 60–?)
GLUCOSE SERPL-MCNC: 150 MG/DL (ref 70–105)
HCT VFR BLD AUTO: 36.4 % (ref 42–54)
MCH RBC QN AUTO: 33.7 PG (ref 27–33)
MCHC RBC AUTO-ENTMCNC: 34.9 G/DL (ref 32–36)
MCV RBC AUTO: 96.6 FL (ref 79–99)
NRBC BLD MANUAL-RTO: 0 % (ref 0–0.19)
PLATELET # BLD AUTO: 113 K/UL (ref 130–400)
POTASSIUM SERPL-SCNC: 4.2 MMOL/L (ref 3.5–5.1)
RBC # BLD AUTO: 3.77 MIL/UL (ref 4.5–6.2)
SODIUM SERPL-SCNC: 139 MMOL/L (ref 136–145)
WBC # BLD AUTO: 6.3 K/UL (ref 4.8–10.8)

## 2020-12-30 ENCOUNTER — HOSPITAL ENCOUNTER (OUTPATIENT)
Dept: HOSPITAL 90 - WHH | Age: 60
Discharge: HOME | End: 2020-12-30
Attending: PODIATRIST
Payer: COMMERCIAL

## 2020-12-30 DIAGNOSIS — N18.2: ICD-10-CM

## 2020-12-30 DIAGNOSIS — L97.522: ICD-10-CM

## 2020-12-30 DIAGNOSIS — E11.319: ICD-10-CM

## 2020-12-30 DIAGNOSIS — I25.10: ICD-10-CM

## 2020-12-30 DIAGNOSIS — E11.69: ICD-10-CM

## 2020-12-30 DIAGNOSIS — Z88.5: ICD-10-CM

## 2020-12-30 DIAGNOSIS — I50.9: ICD-10-CM

## 2020-12-30 DIAGNOSIS — I13.0: ICD-10-CM

## 2020-12-30 DIAGNOSIS — E78.5: ICD-10-CM

## 2020-12-30 DIAGNOSIS — E11.42: ICD-10-CM

## 2020-12-30 DIAGNOSIS — L57.0: ICD-10-CM

## 2020-12-30 DIAGNOSIS — Z88.6: ICD-10-CM

## 2020-12-30 DIAGNOSIS — M19.072: ICD-10-CM

## 2020-12-30 DIAGNOSIS — M86.672: ICD-10-CM

## 2020-12-30 DIAGNOSIS — L84: ICD-10-CM

## 2020-12-30 DIAGNOSIS — I96: ICD-10-CM

## 2020-12-30 DIAGNOSIS — E11.52: ICD-10-CM

## 2020-12-30 DIAGNOSIS — E11.22: ICD-10-CM

## 2020-12-30 DIAGNOSIS — K21.9: ICD-10-CM

## 2020-12-30 DIAGNOSIS — M86.30: ICD-10-CM

## 2020-12-30 DIAGNOSIS — E11.621: Primary | ICD-10-CM

## 2020-12-30 PROCEDURE — 11042 DBRDMT SUBQ TIS 1ST 20SQCM/<: CPT

## 2021-01-06 ENCOUNTER — HOSPITAL ENCOUNTER (OUTPATIENT)
Dept: HOSPITAL 90 - WHH | Age: 61
Discharge: HOME | End: 2021-01-06
Attending: PODIATRIST
Payer: COMMERCIAL

## 2021-01-06 DIAGNOSIS — K21.9: ICD-10-CM

## 2021-01-06 DIAGNOSIS — I96: ICD-10-CM

## 2021-01-06 DIAGNOSIS — L57.0: ICD-10-CM

## 2021-01-06 DIAGNOSIS — N18.2: ICD-10-CM

## 2021-01-06 DIAGNOSIS — E11.52: ICD-10-CM

## 2021-01-06 DIAGNOSIS — M86.30: ICD-10-CM

## 2021-01-06 DIAGNOSIS — I13.0: ICD-10-CM

## 2021-01-06 DIAGNOSIS — I50.9: ICD-10-CM

## 2021-01-06 DIAGNOSIS — M19.072: ICD-10-CM

## 2021-01-06 DIAGNOSIS — E78.5: ICD-10-CM

## 2021-01-06 DIAGNOSIS — L97.522: ICD-10-CM

## 2021-01-06 DIAGNOSIS — M86.672: ICD-10-CM

## 2021-01-06 DIAGNOSIS — E11.621: Primary | ICD-10-CM

## 2021-01-06 DIAGNOSIS — L84: ICD-10-CM

## 2021-01-06 DIAGNOSIS — Z88.5: ICD-10-CM

## 2021-01-06 DIAGNOSIS — E11.69: ICD-10-CM

## 2021-01-06 DIAGNOSIS — E11.42: ICD-10-CM

## 2021-01-06 DIAGNOSIS — Z88.6: ICD-10-CM

## 2021-01-06 DIAGNOSIS — E11.22: ICD-10-CM

## 2021-01-06 DIAGNOSIS — E11.319: ICD-10-CM

## 2021-01-06 DIAGNOSIS — I25.10: ICD-10-CM

## 2021-01-06 PROCEDURE — 11042 DBRDMT SUBQ TIS 1ST 20SQCM/<: CPT

## 2021-01-20 ENCOUNTER — HOSPITAL ENCOUNTER (OUTPATIENT)
Dept: HOSPITAL 90 - WHH | Age: 61
Discharge: HOME | End: 2021-01-20
Attending: PODIATRIST
Payer: COMMERCIAL

## 2021-01-20 DIAGNOSIS — B35.1: ICD-10-CM

## 2021-01-20 DIAGNOSIS — L97.522: ICD-10-CM

## 2021-01-20 DIAGNOSIS — I13.0: ICD-10-CM

## 2021-01-20 DIAGNOSIS — E11.22: ICD-10-CM

## 2021-01-20 DIAGNOSIS — I25.10: ICD-10-CM

## 2021-01-20 DIAGNOSIS — Z88.5: ICD-10-CM

## 2021-01-20 DIAGNOSIS — E11.69: ICD-10-CM

## 2021-01-20 DIAGNOSIS — N18.2: ICD-10-CM

## 2021-01-20 DIAGNOSIS — E11.319: ICD-10-CM

## 2021-01-20 DIAGNOSIS — Z88.6: ICD-10-CM

## 2021-01-20 DIAGNOSIS — I96: ICD-10-CM

## 2021-01-20 DIAGNOSIS — M86.30: ICD-10-CM

## 2021-01-20 DIAGNOSIS — I50.9: ICD-10-CM

## 2021-01-20 DIAGNOSIS — E11.52: ICD-10-CM

## 2021-01-20 DIAGNOSIS — E11.42: ICD-10-CM

## 2021-01-20 DIAGNOSIS — K21.9: ICD-10-CM

## 2021-01-20 DIAGNOSIS — E11.621: Primary | ICD-10-CM

## 2021-01-20 DIAGNOSIS — M86.672: ICD-10-CM

## 2021-01-20 DIAGNOSIS — E78.5: ICD-10-CM

## 2021-01-20 DIAGNOSIS — M19.072: ICD-10-CM

## 2021-01-20 DIAGNOSIS — L84: ICD-10-CM

## 2021-01-20 DIAGNOSIS — L57.0: ICD-10-CM

## 2021-01-20 PROCEDURE — 11721 DEBRIDE NAIL 6 OR MORE: CPT

## 2021-02-03 ENCOUNTER — HOSPITAL ENCOUNTER (OUTPATIENT)
Dept: HOSPITAL 90 - WHH | Age: 61
Discharge: HOME | End: 2021-02-03
Attending: PODIATRIST
Payer: COMMERCIAL

## 2021-02-03 DIAGNOSIS — L84: ICD-10-CM

## 2021-02-03 DIAGNOSIS — E11.319: ICD-10-CM

## 2021-02-03 DIAGNOSIS — E11.69: ICD-10-CM

## 2021-02-03 DIAGNOSIS — M86.672: ICD-10-CM

## 2021-02-03 DIAGNOSIS — E78.5: ICD-10-CM

## 2021-02-03 DIAGNOSIS — N18.2: ICD-10-CM

## 2021-02-03 DIAGNOSIS — M86.30: ICD-10-CM

## 2021-02-03 DIAGNOSIS — I25.10: ICD-10-CM

## 2021-02-03 DIAGNOSIS — I50.9: ICD-10-CM

## 2021-02-03 DIAGNOSIS — K21.9: ICD-10-CM

## 2021-02-03 DIAGNOSIS — Z88.6: ICD-10-CM

## 2021-02-03 DIAGNOSIS — E11.52: ICD-10-CM

## 2021-02-03 DIAGNOSIS — E11.42: ICD-10-CM

## 2021-02-03 DIAGNOSIS — I13.0: ICD-10-CM

## 2021-02-03 DIAGNOSIS — E11.621: Primary | ICD-10-CM

## 2021-02-03 DIAGNOSIS — I96: ICD-10-CM

## 2021-02-03 DIAGNOSIS — E11.22: ICD-10-CM

## 2021-02-03 DIAGNOSIS — M19.072: ICD-10-CM

## 2021-02-03 DIAGNOSIS — L57.0: ICD-10-CM

## 2021-02-03 DIAGNOSIS — L97.522: ICD-10-CM

## 2021-02-03 DIAGNOSIS — B35.1: ICD-10-CM

## 2021-02-03 DIAGNOSIS — Z88.5: ICD-10-CM

## 2021-02-03 PROCEDURE — 87070 CULTURE OTHR SPECIMN AEROBIC: CPT

## 2021-02-03 PROCEDURE — 11042 DBRDMT SUBQ TIS 1ST 20SQCM/<: CPT

## 2021-02-17 ENCOUNTER — HOSPITAL ENCOUNTER (OUTPATIENT)
Dept: HOSPITAL 90 - WHH | Age: 61
Discharge: HOME | End: 2021-02-17
Attending: PODIATRIST
Payer: COMMERCIAL

## 2021-02-17 DIAGNOSIS — E11.52: ICD-10-CM

## 2021-02-17 DIAGNOSIS — M86.30: ICD-10-CM

## 2021-02-17 DIAGNOSIS — L84: ICD-10-CM

## 2021-02-17 DIAGNOSIS — M86.672: ICD-10-CM

## 2021-02-17 DIAGNOSIS — E11.42: ICD-10-CM

## 2021-02-17 DIAGNOSIS — N18.2: ICD-10-CM

## 2021-02-17 DIAGNOSIS — L97.522: ICD-10-CM

## 2021-02-17 DIAGNOSIS — I96: ICD-10-CM

## 2021-02-17 DIAGNOSIS — E11.69: ICD-10-CM

## 2021-02-17 DIAGNOSIS — I13.0: ICD-10-CM

## 2021-02-17 DIAGNOSIS — E78.5: ICD-10-CM

## 2021-02-17 DIAGNOSIS — E11.319: ICD-10-CM

## 2021-02-17 DIAGNOSIS — Z88.5: ICD-10-CM

## 2021-02-17 DIAGNOSIS — L57.0: ICD-10-CM

## 2021-02-17 DIAGNOSIS — Z88.6: ICD-10-CM

## 2021-02-17 DIAGNOSIS — M19.072: ICD-10-CM

## 2021-02-17 DIAGNOSIS — E11.621: Primary | ICD-10-CM

## 2021-02-17 DIAGNOSIS — B35.1: ICD-10-CM

## 2021-02-17 DIAGNOSIS — K21.9: ICD-10-CM

## 2021-02-17 DIAGNOSIS — E11.22: ICD-10-CM

## 2021-02-17 DIAGNOSIS — I50.9: ICD-10-CM

## 2021-02-17 DIAGNOSIS — I25.10: ICD-10-CM

## 2021-02-17 PROCEDURE — 99214 OFFICE O/P EST MOD 30 MIN: CPT

## 2021-02-24 ENCOUNTER — HOSPITAL ENCOUNTER (OUTPATIENT)
Dept: HOSPITAL 90 - WHH | Age: 61
Discharge: HOME | End: 2021-02-24
Attending: PODIATRIST
Payer: COMMERCIAL

## 2021-02-24 DIAGNOSIS — E11.22: ICD-10-CM

## 2021-02-24 DIAGNOSIS — E11.621: Primary | ICD-10-CM

## 2021-02-24 DIAGNOSIS — M86.30: ICD-10-CM

## 2021-02-24 DIAGNOSIS — I50.9: ICD-10-CM

## 2021-02-24 DIAGNOSIS — E11.42: ICD-10-CM

## 2021-02-24 DIAGNOSIS — L57.0: ICD-10-CM

## 2021-02-24 DIAGNOSIS — E11.319: ICD-10-CM

## 2021-02-24 DIAGNOSIS — I13.0: ICD-10-CM

## 2021-02-24 DIAGNOSIS — L97.522: ICD-10-CM

## 2021-02-24 DIAGNOSIS — Z88.5: ICD-10-CM

## 2021-02-24 DIAGNOSIS — E78.5: ICD-10-CM

## 2021-02-24 DIAGNOSIS — I96: ICD-10-CM

## 2021-02-24 DIAGNOSIS — E11.52: ICD-10-CM

## 2021-02-24 DIAGNOSIS — M19.072: ICD-10-CM

## 2021-02-24 DIAGNOSIS — Z88.6: ICD-10-CM

## 2021-02-24 DIAGNOSIS — L84: ICD-10-CM

## 2021-02-24 DIAGNOSIS — M86.672: ICD-10-CM

## 2021-02-24 DIAGNOSIS — I25.10: ICD-10-CM

## 2021-02-24 DIAGNOSIS — B35.1: ICD-10-CM

## 2021-02-24 DIAGNOSIS — K21.9: ICD-10-CM

## 2021-02-24 DIAGNOSIS — N18.2: ICD-10-CM

## 2021-02-24 DIAGNOSIS — E11.69: ICD-10-CM

## 2021-02-24 PROCEDURE — 11042 DBRDMT SUBQ TIS 1ST 20SQCM/<: CPT

## 2021-03-24 ENCOUNTER — HOSPITAL ENCOUNTER (OUTPATIENT)
Dept: HOSPITAL 90 - WHH | Age: 61
Discharge: HOME | End: 2021-03-24
Attending: PODIATRIST
Payer: COMMERCIAL

## 2021-03-24 DIAGNOSIS — E78.5: ICD-10-CM

## 2021-03-24 DIAGNOSIS — K21.9: ICD-10-CM

## 2021-03-24 DIAGNOSIS — E11.22: ICD-10-CM

## 2021-03-24 DIAGNOSIS — I25.10: ICD-10-CM

## 2021-03-24 DIAGNOSIS — L97.522: ICD-10-CM

## 2021-03-24 DIAGNOSIS — E11.42: ICD-10-CM

## 2021-03-24 DIAGNOSIS — I50.9: ICD-10-CM

## 2021-03-24 DIAGNOSIS — L57.0: ICD-10-CM

## 2021-03-24 DIAGNOSIS — E11.319: ICD-10-CM

## 2021-03-24 DIAGNOSIS — I13.0: ICD-10-CM

## 2021-03-24 DIAGNOSIS — M86.672: ICD-10-CM

## 2021-03-24 DIAGNOSIS — M86.30: ICD-10-CM

## 2021-03-24 DIAGNOSIS — Z88.6: ICD-10-CM

## 2021-03-24 DIAGNOSIS — Z88.5: ICD-10-CM

## 2021-03-24 DIAGNOSIS — N18.2: ICD-10-CM

## 2021-03-24 DIAGNOSIS — E11.69: ICD-10-CM

## 2021-03-24 DIAGNOSIS — B35.1: ICD-10-CM

## 2021-03-24 DIAGNOSIS — E11.621: Primary | ICD-10-CM

## 2021-03-24 DIAGNOSIS — I96: ICD-10-CM

## 2021-03-24 DIAGNOSIS — L84: ICD-10-CM

## 2021-03-24 DIAGNOSIS — E11.52: ICD-10-CM

## 2021-03-24 DIAGNOSIS — M19.072: ICD-10-CM

## 2021-03-24 PROCEDURE — 11042 DBRDMT SUBQ TIS 1ST 20SQCM/<: CPT

## 2021-03-26 ENCOUNTER — HOSPITAL ENCOUNTER (OUTPATIENT)
Dept: HOSPITAL 90 - RAH | Age: 61
Discharge: HOME | End: 2021-03-26
Attending: PODIATRIST
Payer: COMMERCIAL

## 2021-03-26 DIAGNOSIS — M79.672: Primary | ICD-10-CM

## 2021-03-26 PROCEDURE — 73630 X-RAY EXAM OF FOOT: CPT

## 2021-04-07 ENCOUNTER — HOSPITAL ENCOUNTER (OUTPATIENT)
Dept: HOSPITAL 90 - WHH | Age: 61
Discharge: HOME | End: 2021-04-07
Attending: PODIATRIST
Payer: COMMERCIAL

## 2021-04-07 DIAGNOSIS — L57.0: ICD-10-CM

## 2021-04-07 DIAGNOSIS — E78.5: ICD-10-CM

## 2021-04-07 DIAGNOSIS — L97.522: ICD-10-CM

## 2021-04-07 DIAGNOSIS — I50.9: ICD-10-CM

## 2021-04-07 DIAGNOSIS — L84: ICD-10-CM

## 2021-04-07 DIAGNOSIS — N18.2: ICD-10-CM

## 2021-04-07 DIAGNOSIS — B35.1: ICD-10-CM

## 2021-04-07 DIAGNOSIS — E11.42: ICD-10-CM

## 2021-04-07 DIAGNOSIS — M86.672: ICD-10-CM

## 2021-04-07 DIAGNOSIS — M86.30: ICD-10-CM

## 2021-04-07 DIAGNOSIS — E11.22: ICD-10-CM

## 2021-04-07 DIAGNOSIS — Z88.5: ICD-10-CM

## 2021-04-07 DIAGNOSIS — I96: ICD-10-CM

## 2021-04-07 DIAGNOSIS — K21.9: ICD-10-CM

## 2021-04-07 DIAGNOSIS — Z88.6: ICD-10-CM

## 2021-04-07 DIAGNOSIS — E11.621: Primary | ICD-10-CM

## 2021-04-07 DIAGNOSIS — I25.10: ICD-10-CM

## 2021-04-07 DIAGNOSIS — M19.072: ICD-10-CM

## 2021-04-07 DIAGNOSIS — E11.52: ICD-10-CM

## 2021-04-07 DIAGNOSIS — E11.319: ICD-10-CM

## 2021-04-07 DIAGNOSIS — I13.0: ICD-10-CM

## 2021-04-07 DIAGNOSIS — E11.69: ICD-10-CM

## 2021-04-07 PROCEDURE — 11042 DBRDMT SUBQ TIS 1ST 20SQCM/<: CPT

## 2021-04-28 ENCOUNTER — HOSPITAL ENCOUNTER (OUTPATIENT)
Dept: HOSPITAL 90 - WHH | Age: 61
Discharge: HOME | End: 2021-04-28
Attending: PODIATRIST
Payer: COMMERCIAL

## 2021-04-28 DIAGNOSIS — E11.69: ICD-10-CM

## 2021-04-28 DIAGNOSIS — Z88.6: ICD-10-CM

## 2021-04-28 DIAGNOSIS — L97.522: ICD-10-CM

## 2021-04-28 DIAGNOSIS — E11.42: ICD-10-CM

## 2021-04-28 DIAGNOSIS — E78.5: ICD-10-CM

## 2021-04-28 DIAGNOSIS — K21.9: ICD-10-CM

## 2021-04-28 DIAGNOSIS — I50.9: ICD-10-CM

## 2021-04-28 DIAGNOSIS — E11.22: ICD-10-CM

## 2021-04-28 DIAGNOSIS — N18.2: ICD-10-CM

## 2021-04-28 DIAGNOSIS — M86.30: ICD-10-CM

## 2021-04-28 DIAGNOSIS — E11.52: ICD-10-CM

## 2021-04-28 DIAGNOSIS — B35.1: ICD-10-CM

## 2021-04-28 DIAGNOSIS — M86.672: ICD-10-CM

## 2021-04-28 DIAGNOSIS — I13.0: ICD-10-CM

## 2021-04-28 DIAGNOSIS — I96: ICD-10-CM

## 2021-04-28 DIAGNOSIS — L57.0: ICD-10-CM

## 2021-04-28 DIAGNOSIS — M19.072: ICD-10-CM

## 2021-04-28 DIAGNOSIS — L84: ICD-10-CM

## 2021-04-28 DIAGNOSIS — E11.621: Primary | ICD-10-CM

## 2021-04-28 DIAGNOSIS — Z88.5: ICD-10-CM

## 2021-04-28 DIAGNOSIS — E11.319: ICD-10-CM

## 2021-04-28 DIAGNOSIS — I25.10: ICD-10-CM

## 2021-04-28 PROCEDURE — 11042 DBRDMT SUBQ TIS 1ST 20SQCM/<: CPT

## 2021-05-19 ENCOUNTER — HOSPITAL ENCOUNTER (OUTPATIENT)
Dept: HOSPITAL 90 - WHH | Age: 61
Discharge: HOME | End: 2021-05-19
Attending: PODIATRIST
Payer: COMMERCIAL

## 2021-05-19 DIAGNOSIS — E11.621: ICD-10-CM

## 2021-05-19 DIAGNOSIS — Z88.5: ICD-10-CM

## 2021-05-19 DIAGNOSIS — E11.42: ICD-10-CM

## 2021-05-19 DIAGNOSIS — Z88.6: ICD-10-CM

## 2021-05-19 DIAGNOSIS — E11.319: ICD-10-CM

## 2021-05-19 DIAGNOSIS — M86.672: ICD-10-CM

## 2021-05-19 DIAGNOSIS — B35.1: ICD-10-CM

## 2021-05-19 DIAGNOSIS — L03.031: ICD-10-CM

## 2021-05-19 DIAGNOSIS — K21.9: ICD-10-CM

## 2021-05-19 DIAGNOSIS — I96: ICD-10-CM

## 2021-05-19 DIAGNOSIS — L97.522: ICD-10-CM

## 2021-05-19 DIAGNOSIS — E11.69: ICD-10-CM

## 2021-05-19 DIAGNOSIS — I25.10: ICD-10-CM

## 2021-05-19 DIAGNOSIS — L60.0: Primary | ICD-10-CM

## 2021-05-19 DIAGNOSIS — N18.2: ICD-10-CM

## 2021-05-19 DIAGNOSIS — M19.072: ICD-10-CM

## 2021-05-19 DIAGNOSIS — L57.0: ICD-10-CM

## 2021-05-19 DIAGNOSIS — E11.52: ICD-10-CM

## 2021-05-19 DIAGNOSIS — E78.5: ICD-10-CM

## 2021-05-19 DIAGNOSIS — L84: ICD-10-CM

## 2021-05-19 DIAGNOSIS — I13.0: ICD-10-CM

## 2021-05-19 DIAGNOSIS — I50.9: ICD-10-CM

## 2021-05-19 DIAGNOSIS — E11.22: ICD-10-CM

## 2021-05-19 DIAGNOSIS — M86.30: ICD-10-CM

## 2021-05-19 PROCEDURE — 11730 AVULSION NAIL PLATE SIMPLE 1: CPT

## 2021-06-02 ENCOUNTER — HOSPITAL ENCOUNTER (OUTPATIENT)
Dept: HOSPITAL 90 - RAH | Age: 61
Discharge: HOME | End: 2021-06-02
Attending: INTERNAL MEDICINE
Payer: COMMERCIAL

## 2021-06-02 ENCOUNTER — HOSPITAL ENCOUNTER (OUTPATIENT)
Dept: HOSPITAL 90 - WHH | Age: 61
Discharge: HOME | End: 2021-06-02
Attending: PODIATRIST
Payer: COMMERCIAL

## 2021-06-02 DIAGNOSIS — L57.0: ICD-10-CM

## 2021-06-02 DIAGNOSIS — K21.9: ICD-10-CM

## 2021-06-02 DIAGNOSIS — E11.22: ICD-10-CM

## 2021-06-02 DIAGNOSIS — I25.10: ICD-10-CM

## 2021-06-02 DIAGNOSIS — E78.5: ICD-10-CM

## 2021-06-02 DIAGNOSIS — M25.512: Primary | ICD-10-CM

## 2021-06-02 DIAGNOSIS — L97.522: ICD-10-CM

## 2021-06-02 DIAGNOSIS — M19.072: ICD-10-CM

## 2021-06-02 DIAGNOSIS — L03.031: ICD-10-CM

## 2021-06-02 DIAGNOSIS — I96: ICD-10-CM

## 2021-06-02 DIAGNOSIS — B35.1: ICD-10-CM

## 2021-06-02 DIAGNOSIS — Z88.5: ICD-10-CM

## 2021-06-02 DIAGNOSIS — I13.0: ICD-10-CM

## 2021-06-02 DIAGNOSIS — E11.69: ICD-10-CM

## 2021-06-02 DIAGNOSIS — E11.319: ICD-10-CM

## 2021-06-02 DIAGNOSIS — E11.42: ICD-10-CM

## 2021-06-02 DIAGNOSIS — I50.9: ICD-10-CM

## 2021-06-02 DIAGNOSIS — N18.2: ICD-10-CM

## 2021-06-02 DIAGNOSIS — E11.52: ICD-10-CM

## 2021-06-02 DIAGNOSIS — Z88.6: ICD-10-CM

## 2021-06-02 DIAGNOSIS — L84: ICD-10-CM

## 2021-06-02 DIAGNOSIS — M86.672: ICD-10-CM

## 2021-06-02 DIAGNOSIS — M86.30: ICD-10-CM

## 2021-06-02 DIAGNOSIS — M13.822: ICD-10-CM

## 2021-06-02 DIAGNOSIS — L60.0: Primary | ICD-10-CM

## 2021-06-02 DIAGNOSIS — E11.621: ICD-10-CM

## 2021-06-02 PROCEDURE — 11042 DBRDMT SUBQ TIS 1ST 20SQCM/<: CPT

## 2021-06-02 PROCEDURE — 11730 AVULSION NAIL PLATE SIMPLE 1: CPT

## 2021-06-02 PROCEDURE — 73070 X-RAY EXAM OF ELBOW: CPT

## 2021-06-02 PROCEDURE — 73030 X-RAY EXAM OF SHOULDER: CPT

## 2021-06-11 ENCOUNTER — HOSPITAL ENCOUNTER (OUTPATIENT)
Dept: HOSPITAL 90 - LAB | Age: 61
Discharge: HOME | End: 2021-06-11
Attending: INTERNAL MEDICINE
Payer: COMMERCIAL

## 2021-06-11 DIAGNOSIS — I73.9: Primary | ICD-10-CM

## 2021-06-11 LAB
ALBUMIN SERPL-MCNC: 3.7 G/DL (ref 3.5–5)
ALT SERPL-CCNC: 32 U/L (ref 12–78)
AST SERPL-CCNC: 19 U/L (ref 10–37)
BASOPHILS NFR BLD AUTO: 0.5 % (ref 0–5)
BILIRUB SERPL-MCNC: 0.7 MG/DL (ref 0.2–1)
BUN SERPL-MCNC: 20 MG/DL (ref 7–18)
CHLORIDE SERPL-SCNC: 101 MMOL/L (ref 101–111)
CHOLEST SERPL-MCNC: 224 MG/DL (ref ?–200)
CO2 SERPL-SCNC: 30 MMOL/L (ref 21–32)
CREAT SERPL-MCNC: 1.2 MG/DL (ref 0.5–1.5)
EOSINOPHIL NFR BLD AUTO: 1.2 % (ref 0–8)
ERYTHROCYTE [DISTWIDTH] IN BLOOD BY AUTOMATED COUNT: 13.2 % (ref 11–15.5)
GFR SERPL CREATININE-BSD FRML MDRD: 66 ML/MIN (ref 60–?)
GLUCOSE SERPL-MCNC: 341 MG/DL (ref 70–105)
HCT VFR BLD AUTO: 41.5 % (ref 42–54)
HDLC SERPL-MCNC: 51 MG/DL (ref 29–71)
LDLC SERPL CALC-MCNC: 140 MG/DL (ref 0–99)
LYMPHOCYTES NFR SPEC AUTO: 32.4 % (ref 21–51)
MCH RBC QN AUTO: 33.7 PG (ref 27–33)
MCHC RBC AUTO-ENTMCNC: 33.5 G/DL (ref 32–36)
MCV RBC AUTO: 100.5 FL (ref 79–99)
MONOCYTES NFR BLD AUTO: 9.8 % (ref 3–13)
NEUTROPHILS NFR BLD AUTO: 55.8 % (ref 40–77)
NRBC BLD MANUAL-RTO: 0 % (ref 0–0.19)
PLATELET # BLD AUTO: 108 K/UL (ref 130–400)
POTASSIUM SERPL-SCNC: 5 MMOL/L (ref 3.5–5.1)
PROT SERPL-MCNC: 7.3 G/DL (ref 6–8.3)
RBC # BLD AUTO: 4.13 MIL/UL (ref 4.5–6.2)
SODIUM SERPL-SCNC: 138 MMOL/L (ref 136–145)
TRIGL SERPL-MCNC: 221 MG/DL (ref 30–200)
WBC # BLD AUTO: 5.9 K/UL (ref 4.8–10.8)

## 2021-06-11 PROCEDURE — 80053 COMPREHEN METABOLIC PANEL: CPT

## 2021-06-11 PROCEDURE — 80061 LIPID PANEL: CPT

## 2021-06-11 PROCEDURE — 85025 COMPLETE CBC W/AUTO DIFF WBC: CPT

## 2021-06-11 PROCEDURE — 36415 COLL VENOUS BLD VENIPUNCTURE: CPT

## 2021-06-16 ENCOUNTER — HOSPITAL ENCOUNTER (OUTPATIENT)
Dept: HOSPITAL 90 - WHH | Age: 61
Discharge: HOME | End: 2021-06-16
Attending: PODIATRIST
Payer: COMMERCIAL

## 2021-06-16 DIAGNOSIS — I96: ICD-10-CM

## 2021-06-16 DIAGNOSIS — L03.031: ICD-10-CM

## 2021-06-16 DIAGNOSIS — M86.672: ICD-10-CM

## 2021-06-16 DIAGNOSIS — E11.621: ICD-10-CM

## 2021-06-16 DIAGNOSIS — L84: ICD-10-CM

## 2021-06-16 DIAGNOSIS — M86.30: ICD-10-CM

## 2021-06-16 DIAGNOSIS — I25.10: ICD-10-CM

## 2021-06-16 DIAGNOSIS — N18.2: ICD-10-CM

## 2021-06-16 DIAGNOSIS — E11.42: ICD-10-CM

## 2021-06-16 DIAGNOSIS — M19.072: ICD-10-CM

## 2021-06-16 DIAGNOSIS — B35.1: ICD-10-CM

## 2021-06-16 DIAGNOSIS — E11.22: ICD-10-CM

## 2021-06-16 DIAGNOSIS — E11.69: ICD-10-CM

## 2021-06-16 DIAGNOSIS — L60.0: Primary | ICD-10-CM

## 2021-06-16 DIAGNOSIS — K21.9: ICD-10-CM

## 2021-06-16 DIAGNOSIS — L97.522: ICD-10-CM

## 2021-06-16 DIAGNOSIS — E11.52: ICD-10-CM

## 2021-06-16 DIAGNOSIS — I13.0: ICD-10-CM

## 2021-06-16 DIAGNOSIS — E11.319: ICD-10-CM

## 2021-06-16 DIAGNOSIS — E78.5: ICD-10-CM

## 2021-06-16 DIAGNOSIS — I50.9: ICD-10-CM

## 2021-06-16 DIAGNOSIS — Z88.6: ICD-10-CM

## 2021-06-16 DIAGNOSIS — L57.0: ICD-10-CM

## 2021-06-16 PROCEDURE — 11042 DBRDMT SUBQ TIS 1ST 20SQCM/<: CPT

## 2021-06-30 ENCOUNTER — HOSPITAL ENCOUNTER (OUTPATIENT)
Dept: HOSPITAL 90 - WHH | Age: 61
Discharge: HOME | End: 2021-06-30
Attending: PODIATRIST
Payer: COMMERCIAL

## 2021-06-30 DIAGNOSIS — M86.672: ICD-10-CM

## 2021-06-30 DIAGNOSIS — L03.031: ICD-10-CM

## 2021-06-30 DIAGNOSIS — E78.5: ICD-10-CM

## 2021-06-30 DIAGNOSIS — E11.42: ICD-10-CM

## 2021-06-30 DIAGNOSIS — M86.30: ICD-10-CM

## 2021-06-30 DIAGNOSIS — E11.621: Primary | ICD-10-CM

## 2021-06-30 DIAGNOSIS — L57.0: ICD-10-CM

## 2021-06-30 DIAGNOSIS — L60.0: ICD-10-CM

## 2021-06-30 DIAGNOSIS — B35.1: ICD-10-CM

## 2021-06-30 DIAGNOSIS — L97.522: ICD-10-CM

## 2021-06-30 DIAGNOSIS — L84: ICD-10-CM

## 2021-06-30 DIAGNOSIS — E11.22: ICD-10-CM

## 2021-06-30 DIAGNOSIS — E11.52: ICD-10-CM

## 2021-06-30 DIAGNOSIS — M19.072: ICD-10-CM

## 2021-06-30 DIAGNOSIS — K21.9: ICD-10-CM

## 2021-06-30 DIAGNOSIS — I50.9: ICD-10-CM

## 2021-06-30 DIAGNOSIS — Z88.6: ICD-10-CM

## 2021-06-30 DIAGNOSIS — I25.10: ICD-10-CM

## 2021-06-30 DIAGNOSIS — I13.0: ICD-10-CM

## 2021-06-30 DIAGNOSIS — I96: ICD-10-CM

## 2021-06-30 DIAGNOSIS — N18.2: ICD-10-CM

## 2021-06-30 DIAGNOSIS — E11.69: ICD-10-CM

## 2021-06-30 DIAGNOSIS — E11.319: ICD-10-CM

## 2021-06-30 PROCEDURE — 97597 DBRDMT OPN WND 1ST 20 CM/<: CPT

## 2021-07-16 ENCOUNTER — HOSPITAL ENCOUNTER (OUTPATIENT)
Dept: HOSPITAL 90 - LAB | Age: 61
Discharge: HOME | End: 2021-07-16
Attending: PHYSICIAN ASSISTANT
Payer: COMMERCIAL

## 2021-07-16 DIAGNOSIS — I10: Primary | ICD-10-CM

## 2021-07-16 DIAGNOSIS — I73.9: ICD-10-CM

## 2021-07-16 DIAGNOSIS — I25.119: ICD-10-CM

## 2021-07-16 PROCEDURE — 36415 COLL VENOUS BLD VENIPUNCTURE: CPT

## 2021-07-16 PROCEDURE — 82306 VITAMIN D 25 HYDROXY: CPT

## 2021-07-19 VITALS — DIASTOLIC BLOOD PRESSURE: 86 MMHG | SYSTOLIC BLOOD PRESSURE: 131 MMHG

## 2021-07-19 LAB
APTT PPP: 25.4 SEC (ref 26.3–35.5)
BASOPHILS NFR BLD AUTO: 0.3 % (ref 0–5)
BUN SERPL-MCNC: 27 MG/DL (ref 7–18)
CHLORIDE SERPL-SCNC: 96 MMOL/L (ref 101–111)
CO2 SERPL-SCNC: 29 MMOL/L (ref 21–32)
CREAT SERPL-MCNC: 1.2 MG/DL (ref 0.5–1.5)
EOSINOPHIL NFR BLD AUTO: 0.4 % (ref 0–8)
ERYTHROCYTE [DISTWIDTH] IN BLOOD BY AUTOMATED COUNT: 13.4 % (ref 11–15.5)
GFR SERPL CREATININE-BSD FRML MDRD: 66 ML/MIN (ref 60–?)
GLUCOSE SERPL-MCNC: 392 MG/DL (ref 70–105)
GLUCOSE UR STRIP-MCNC: >=1000 MG/DL
HCT VFR BLD AUTO: 47.5 % (ref 42–54)
INR PPP: 0.95 (ref 0.85–1.15)
KETONES UR STRIP-MCNC: 15 MG/DL
LYMPHOCYTES NFR SPEC AUTO: 18.1 % (ref 21–51)
MCH RBC QN AUTO: 33.7 PG (ref 27–33)
MCHC RBC AUTO-ENTMCNC: 34.1 G/DL (ref 32–36)
MCV RBC AUTO: 98.8 FL (ref 79–99)
MONOCYTES NFR BLD AUTO: 10.3 % (ref 3–13)
NEUTROPHILS NFR BLD AUTO: 70.4 % (ref 40–77)
NRBC BLD MANUAL-RTO: 0 % (ref 0–0.19)
PH UR STRIP: 5.5 [PH] (ref 5–8)
PLATELET # BLD AUTO: 129 K/UL (ref 130–400)
POTASSIUM SERPL-SCNC: 4.9 MMOL/L (ref 3.5–5.1)
PROTHROMBIN TIME: 10.4 SEC (ref 9.6–11.6)
RBC # BLD AUTO: 4.81 MIL/UL (ref 4.5–6.2)
RBC #/AREA URNS HPF: (no result) /HPF (ref 0–1)
SODIUM SERPL-SCNC: 133 MMOL/L (ref 136–145)
SP GR UR STRIP: 1.03 (ref 1–1.03)
UROBILINOGEN UR STRIP-MCNC: 0.2 MG/DL (ref 0.2–1)
WBC # BLD AUTO: 7.4 K/UL (ref 4.8–10.8)
WBC #/AREA URNS HPF: (no result) /HPF (ref 0–1)

## 2021-07-20 ENCOUNTER — HOSPITAL ENCOUNTER (OUTPATIENT)
Dept: HOSPITAL 90 - DAH | Age: 61
Setting detail: OBSERVATION
LOS: 1 days | Discharge: HOME | End: 2021-07-21
Attending: INTERNAL MEDICINE | Admitting: INTERNAL MEDICINE
Payer: COMMERCIAL

## 2021-07-20 VITALS — DIASTOLIC BLOOD PRESSURE: 88 MMHG | SYSTOLIC BLOOD PRESSURE: 152 MMHG

## 2021-07-20 VITALS — DIASTOLIC BLOOD PRESSURE: 84 MMHG | SYSTOLIC BLOOD PRESSURE: 142 MMHG

## 2021-07-20 VITALS — SYSTOLIC BLOOD PRESSURE: 112 MMHG | DIASTOLIC BLOOD PRESSURE: 72 MMHG

## 2021-07-20 VITALS — DIASTOLIC BLOOD PRESSURE: 81 MMHG | SYSTOLIC BLOOD PRESSURE: 121 MMHG

## 2021-07-20 VITALS — SYSTOLIC BLOOD PRESSURE: 166 MMHG | DIASTOLIC BLOOD PRESSURE: 93 MMHG

## 2021-07-20 VITALS — DIASTOLIC BLOOD PRESSURE: 66 MMHG | SYSTOLIC BLOOD PRESSURE: 115 MMHG

## 2021-07-20 VITALS — DIASTOLIC BLOOD PRESSURE: 91 MMHG | SYSTOLIC BLOOD PRESSURE: 150 MMHG

## 2021-07-20 VITALS — WEIGHT: 179.8 LBS | BODY MASS INDEX: 26.63 KG/M2 | HEIGHT: 69 IN

## 2021-07-20 VITALS — SYSTOLIC BLOOD PRESSURE: 114 MMHG | DIASTOLIC BLOOD PRESSURE: 74 MMHG

## 2021-07-20 VITALS — DIASTOLIC BLOOD PRESSURE: 74 MMHG | SYSTOLIC BLOOD PRESSURE: 117 MMHG

## 2021-07-20 DIAGNOSIS — I11.0: ICD-10-CM

## 2021-07-20 DIAGNOSIS — Z79.84: ICD-10-CM

## 2021-07-20 DIAGNOSIS — E11.65: ICD-10-CM

## 2021-07-20 DIAGNOSIS — I25.119: ICD-10-CM

## 2021-07-20 DIAGNOSIS — I50.32: ICD-10-CM

## 2021-07-20 DIAGNOSIS — I73.9: Primary | ICD-10-CM

## 2021-07-20 DIAGNOSIS — Z79.82: ICD-10-CM

## 2021-07-20 DIAGNOSIS — I35.0: ICD-10-CM

## 2021-07-20 DIAGNOSIS — I27.20: ICD-10-CM

## 2021-07-20 DIAGNOSIS — Z95.1: ICD-10-CM

## 2021-07-20 PROCEDURE — 85610 PROTHROMBIN TIME: CPT

## 2021-07-20 PROCEDURE — 82948 REAGENT STRIP/BLOOD GLUCOSE: CPT

## 2021-07-20 PROCEDURE — 96361 HYDRATE IV INFUSION ADD-ON: CPT

## 2021-07-20 PROCEDURE — 85730 THROMBOPLASTIN TIME PARTIAL: CPT

## 2021-07-20 PROCEDURE — 75710 ARTERY X-RAYS ARM/LEG: CPT

## 2021-07-20 PROCEDURE — 99157 MOD SED OTHER PHYS/QHP EA: CPT

## 2021-07-20 PROCEDURE — 96372 THER/PROPH/DIAG INJ SC/IM: CPT

## 2021-07-20 PROCEDURE — 99156 MOD SED OTH PHYS/QHP 5/>YRS: CPT

## 2021-07-20 PROCEDURE — 93005 ELECTROCARDIOGRAM TRACING: CPT

## 2021-07-20 PROCEDURE — 81001 URINALYSIS AUTO W/SCOPE: CPT

## 2021-07-20 PROCEDURE — 71045 X-RAY EXAM CHEST 1 VIEW: CPT

## 2021-07-20 PROCEDURE — 96360 HYDRATION IV INFUSION INIT: CPT

## 2021-07-20 PROCEDURE — 36247 INS CATH ABD/L-EXT ART 3RD: CPT

## 2021-07-20 PROCEDURE — 85025 COMPLETE CBC W/AUTO DIFF WBC: CPT

## 2021-07-20 PROCEDURE — 80048 BASIC METABOLIC PNL TOTAL CA: CPT

## 2021-07-20 PROCEDURE — 36415 COLL VENOUS BLD VENIPUNCTURE: CPT

## 2021-07-20 RX ADMIN — SODIUM CHLORIDE SCH UNIT: 9 INJECTION, SOLUTION INTRAVENOUS at 16:30

## 2021-07-20 RX ADMIN — CARVEDILOL SCH MG: 6.25 TABLET, FILM COATED ORAL at 20:11

## 2021-07-20 RX ADMIN — SODIUM CHLORIDE SCH UNIT: 9 INJECTION, SOLUTION INTRAVENOUS at 20:12

## 2021-07-20 RX ADMIN — CARVEDILOL SCH MG: 6.25 TABLET, FILM COATED ORAL at 16:15

## 2021-07-21 VITALS — SYSTOLIC BLOOD PRESSURE: 126 MMHG | DIASTOLIC BLOOD PRESSURE: 77 MMHG

## 2021-07-21 VITALS — SYSTOLIC BLOOD PRESSURE: 125 MMHG | DIASTOLIC BLOOD PRESSURE: 56 MMHG

## 2021-07-21 VITALS — SYSTOLIC BLOOD PRESSURE: 107 MMHG | DIASTOLIC BLOOD PRESSURE: 67 MMHG

## 2021-07-21 RX ADMIN — SODIUM CHLORIDE SCH UNIT: 9 INJECTION, SOLUTION INTRAVENOUS at 11:30

## 2021-07-21 RX ADMIN — CARVEDILOL SCH MG: 6.25 TABLET, FILM COATED ORAL at 09:07

## 2021-07-21 RX ADMIN — SODIUM CHLORIDE SCH UNIT: 9 INJECTION, SOLUTION INTRAVENOUS at 05:50

## 2021-07-28 ENCOUNTER — HOSPITAL ENCOUNTER (OUTPATIENT)
Dept: HOSPITAL 90 - WHH | Age: 61
Discharge: HOME | End: 2021-07-28
Attending: PODIATRIST
Payer: COMMERCIAL

## 2021-07-28 DIAGNOSIS — L57.0: ICD-10-CM

## 2021-07-28 DIAGNOSIS — L84: ICD-10-CM

## 2021-07-28 DIAGNOSIS — E11.69: ICD-10-CM

## 2021-07-28 DIAGNOSIS — E11.52: ICD-10-CM

## 2021-07-28 DIAGNOSIS — I13.0: ICD-10-CM

## 2021-07-28 DIAGNOSIS — E11.42: ICD-10-CM

## 2021-07-28 DIAGNOSIS — I96: ICD-10-CM

## 2021-07-28 DIAGNOSIS — E78.5: ICD-10-CM

## 2021-07-28 DIAGNOSIS — M19.072: ICD-10-CM

## 2021-07-28 DIAGNOSIS — L03.031: ICD-10-CM

## 2021-07-28 DIAGNOSIS — E11.22: ICD-10-CM

## 2021-07-28 DIAGNOSIS — I25.10: ICD-10-CM

## 2021-07-28 DIAGNOSIS — L60.0: ICD-10-CM

## 2021-07-28 DIAGNOSIS — Z88.6: ICD-10-CM

## 2021-07-28 DIAGNOSIS — N18.2: ICD-10-CM

## 2021-07-28 DIAGNOSIS — M86.30: ICD-10-CM

## 2021-07-28 DIAGNOSIS — I50.9: ICD-10-CM

## 2021-07-28 DIAGNOSIS — K21.9: ICD-10-CM

## 2021-07-28 DIAGNOSIS — B35.1: ICD-10-CM

## 2021-07-28 DIAGNOSIS — E11.319: ICD-10-CM

## 2021-07-28 DIAGNOSIS — E11.621: Primary | ICD-10-CM

## 2021-07-28 DIAGNOSIS — M86.672: ICD-10-CM

## 2021-07-28 DIAGNOSIS — L97.522: ICD-10-CM

## 2021-07-28 PROCEDURE — 97597 DBRDMT OPN WND 1ST 20 CM/<: CPT

## 2021-07-28 PROCEDURE — 11721 DEBRIDE NAIL 6 OR MORE: CPT

## 2021-08-11 ENCOUNTER — HOSPITAL ENCOUNTER (OUTPATIENT)
Dept: HOSPITAL 90 - WHH | Age: 61
Discharge: HOME | End: 2021-08-11
Attending: PODIATRIST
Payer: COMMERCIAL

## 2021-08-11 DIAGNOSIS — L57.0: ICD-10-CM

## 2021-08-11 DIAGNOSIS — E11.621: Primary | ICD-10-CM

## 2021-08-11 DIAGNOSIS — L84: ICD-10-CM

## 2021-08-11 DIAGNOSIS — K21.9: ICD-10-CM

## 2021-08-11 DIAGNOSIS — Z88.6: ICD-10-CM

## 2021-08-11 DIAGNOSIS — E11.69: ICD-10-CM

## 2021-08-11 DIAGNOSIS — L03.031: ICD-10-CM

## 2021-08-11 DIAGNOSIS — I96: ICD-10-CM

## 2021-08-11 DIAGNOSIS — N18.2: ICD-10-CM

## 2021-08-11 DIAGNOSIS — B35.1: ICD-10-CM

## 2021-08-11 DIAGNOSIS — I13.0: ICD-10-CM

## 2021-08-11 DIAGNOSIS — M86.672: ICD-10-CM

## 2021-08-11 DIAGNOSIS — E11.42: ICD-10-CM

## 2021-08-11 DIAGNOSIS — E11.52: ICD-10-CM

## 2021-08-11 DIAGNOSIS — M86.30: ICD-10-CM

## 2021-08-11 DIAGNOSIS — E11.319: ICD-10-CM

## 2021-08-11 DIAGNOSIS — E78.5: ICD-10-CM

## 2021-08-11 DIAGNOSIS — I50.9: ICD-10-CM

## 2021-08-11 DIAGNOSIS — L60.0: ICD-10-CM

## 2021-08-11 DIAGNOSIS — L97.521: ICD-10-CM

## 2021-08-11 DIAGNOSIS — I25.10: ICD-10-CM

## 2021-08-11 DIAGNOSIS — M19.072: ICD-10-CM

## 2021-08-11 DIAGNOSIS — E11.22: ICD-10-CM

## 2021-08-11 PROCEDURE — 99214 OFFICE O/P EST MOD 30 MIN: CPT

## 2022-10-30 ENCOUNTER — HOSPITAL ENCOUNTER (INPATIENT)
Dept: HOSPITAL 90 - EDH | Age: 62
LOS: 18 days | Discharge: HOME | DRG: 300 | End: 2022-11-17
Attending: INTERNAL MEDICINE | Admitting: INTERNAL MEDICINE
Payer: COMMERCIAL

## 2022-10-30 VITALS — DIASTOLIC BLOOD PRESSURE: 76 MMHG | SYSTOLIC BLOOD PRESSURE: 132 MMHG

## 2022-10-30 VITALS — BODY MASS INDEX: 23.79 KG/M2 | HEIGHT: 69 IN | WEIGHT: 160.6 LBS

## 2022-10-30 DIAGNOSIS — E11.69: ICD-10-CM
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
## 2022-10-30 DIAGNOSIS — E87.6: ICD-10-CM

## 2022-10-30 DIAGNOSIS — Z87.19: ICD-10-CM

## 2022-10-30 DIAGNOSIS — Z95.1: ICD-10-CM

## 2022-10-30 DIAGNOSIS — D64.9: ICD-10-CM
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
## 2022-10-30 DIAGNOSIS — L97.519: ICD-10-CM

## 2022-10-30 DIAGNOSIS — S91.204A: ICD-10-CM

## 2022-10-30 DIAGNOSIS — Z85.830: ICD-10-CM

## 2022-10-30 DIAGNOSIS — I35.0: ICD-10-CM

## 2022-10-30 DIAGNOSIS — I25.2: ICD-10-CM

## 2022-10-30 DIAGNOSIS — E66.9: ICD-10-CM
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
## 2022-10-30 DIAGNOSIS — Z74.01: ICD-10-CM

## 2022-10-30 DIAGNOSIS — E11.42: ICD-10-CM

## 2022-10-30 DIAGNOSIS — M86.8X7: ICD-10-CM

## 2022-10-30 DIAGNOSIS — I25.10: ICD-10-CM

## 2022-10-30 DIAGNOSIS — I11.0: ICD-10-CM

## 2022-10-30 DIAGNOSIS — Z83.3: ICD-10-CM

## 2022-10-30 DIAGNOSIS — E11.52: Primary | ICD-10-CM

## 2022-10-30 DIAGNOSIS — L03.115: ICD-10-CM

## 2022-10-30 DIAGNOSIS — Z86.73: ICD-10-CM

## 2022-10-30 DIAGNOSIS — Z95.5: ICD-10-CM

## 2022-10-30 DIAGNOSIS — Z89.429: ICD-10-CM

## 2022-10-30 DIAGNOSIS — I50.22: ICD-10-CM

## 2022-10-30 DIAGNOSIS — E78.00: ICD-10-CM

## 2022-10-30 DIAGNOSIS — E44.1: ICD-10-CM

## 2022-10-30 DIAGNOSIS — K21.9: ICD-10-CM
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
## 2022-10-30 LAB
ALBUMIN SERPL-MCNC: 3.2 G/DL (ref 3.5–5)
ALT SERPL-CCNC: 16 U/L (ref 12–78)
AST SERPL-CCNC: 16 U/L (ref 10–37)
BASOPHILS NFR BLD AUTO: 0.4 % (ref 0–5)
BUN SERPL-MCNC: 14 MG/DL (ref 7–18)
CHLORIDE SERPL-SCNC: 97 MMOL/L (ref 101–111)
CO2 SERPL-SCNC: 30 MMOL/L (ref 21–32)
CREAT SERPL-MCNC: 1 MG/DL (ref 0.5–1.5)
EOSINOPHIL NFR BLD AUTO: 0.8 % (ref 0–8)
ERYTHROCYTE [DISTWIDTH] IN BLOOD BY AUTOMATED COUNT: 14.9 % (ref 11–15.5)
GFR SERPL CREATININE-BSD FRML MDRD: 81 ML/MIN (ref 60–?)
GLUCOSE SERPL-MCNC: 204 MG/DL (ref 70–105)
HCT VFR BLD AUTO: 32.9 % (ref 42–54)
LYMPHOCYTES NFR SPEC AUTO: 23.5 % (ref 21–51)
MCH RBC QN AUTO: 33.7 PG (ref 27–33)
MCHC RBC AUTO-ENTMCNC: 34 G/DL (ref 32–36)
MCV RBC AUTO: 99.1 FL (ref 79–99)
MONOCYTES NFR BLD AUTO: 11.3 % (ref 3–13)
NEUTROPHILS NFR BLD AUTO: 63.6 % (ref 40–77)
NRBC BLD MANUAL-RTO: 0 % (ref 0–0.19)
PLATELET # BLD AUTO: 82 K/UL (ref 130–400)
POTASSIUM SERPL-SCNC: 4.4 MMOL/L (ref 3.5–5.1)
PROT SERPL-MCNC: 7.4 G/DL (ref 6–8.3)
RBC # BLD AUTO: 3.32 MIL/UL (ref 4.5–6.2)
SODIUM SERPL-SCNC: 133 MMOL/L (ref 136–145)
WBC # BLD AUTO: 5.3 K/UL (ref 4.8–10.8)

## 2022-10-30 PROCEDURE — 84132 ASSAY OF SERUM POTASSIUM: CPT

## 2022-10-30 PROCEDURE — 85025 COMPLETE CBC W/AUTO DIFF WBC: CPT

## 2022-10-30 PROCEDURE — 85651 RBC SED RATE NONAUTOMATED: CPT

## 2022-10-30 PROCEDURE — 80053 COMPREHEN METABOLIC PANEL: CPT

## 2022-10-30 PROCEDURE — 83605 ASSAY OF LACTIC ACID: CPT

## 2022-10-30 PROCEDURE — 85045 AUTOMATED RETICULOCYTE COUNT: CPT

## 2022-10-30 PROCEDURE — 83540 ASSAY OF IRON: CPT

## 2022-10-30 PROCEDURE — 85027 COMPLETE CBC AUTOMATED: CPT

## 2022-10-30 PROCEDURE — 83550 IRON BINDING TEST: CPT

## 2022-10-30 PROCEDURE — 82948 REAGENT STRIP/BLOOD GLUCOSE: CPT

## 2022-10-30 PROCEDURE — 84145 PROCALCITONIN (PCT): CPT

## 2022-10-30 PROCEDURE — 83036 HEMOGLOBIN GLYCOSYLATED A1C: CPT

## 2022-10-30 PROCEDURE — 85610 PROTHROMBIN TIME: CPT

## 2022-10-30 PROCEDURE — 84100 ASSAY OF PHOSPHORUS: CPT

## 2022-10-30 PROCEDURE — 36415 COLL VENOUS BLD VENIPUNCTURE: CPT

## 2022-10-30 PROCEDURE — 80202 ASSAY OF VANCOMYCIN: CPT

## 2022-10-30 PROCEDURE — 84443 ASSAY THYROID STIM HORMONE: CPT

## 2022-10-30 PROCEDURE — 82607 VITAMIN B-12: CPT

## 2022-10-30 PROCEDURE — 73720 MRI LWR EXTREMITY W/O&W/DYE: CPT

## 2022-10-30 PROCEDURE — 83880 ASSAY OF NATRIURETIC PEPTIDE: CPT

## 2022-10-30 PROCEDURE — 83735 ASSAY OF MAGNESIUM: CPT

## 2022-10-30 PROCEDURE — 73630 X-RAY EXAM OF FOOT: CPT

## 2022-10-30 PROCEDURE — 93971 EXTREMITY STUDY: CPT

## 2022-10-30 PROCEDURE — 93926 LOWER EXTREMITY STUDY: CPT

## 2022-10-30 PROCEDURE — 82746 ASSAY OF FOLIC ACID SERUM: CPT

## 2022-10-30 PROCEDURE — 80048 BASIC METABOLIC PNL TOTAL CA: CPT

## 2022-10-30 PROCEDURE — 71045 X-RAY EXAM CHEST 1 VIEW: CPT

## 2022-10-30 PROCEDURE — 82728 ASSAY OF FERRITIN: CPT

## 2022-10-30 PROCEDURE — 87070 CULTURE OTHR SPECIMN AEROBIC: CPT

## 2022-10-30 PROCEDURE — 94664 DEMO&/EVAL PT USE INHALER: CPT

## 2022-10-30 PROCEDURE — 94640 AIRWAY INHALATION TREATMENT: CPT

## 2022-10-30 PROCEDURE — 87040 BLOOD CULTURE FOR BACTERIA: CPT

## 2022-10-30 RX ADMIN — SODIUM CHLORIDE SCH MLS/HR: 0.9 INJECTION, SOLUTION INTRAVENOUS at 22:04

## 2022-10-30 RX ADMIN — FAMOTIDINE SCH MG: 10 INJECTION INTRAVENOUS at 22:04

## 2022-10-31 VITALS — SYSTOLIC BLOOD PRESSURE: 111 MMHG | DIASTOLIC BLOOD PRESSURE: 66 MMHG

## 2022-10-31 VITALS — SYSTOLIC BLOOD PRESSURE: 116 MMHG | DIASTOLIC BLOOD PRESSURE: 71 MMHG

## 2022-10-31 VITALS — DIASTOLIC BLOOD PRESSURE: 53 MMHG | SYSTOLIC BLOOD PRESSURE: 88 MMHG

## 2022-10-31 VITALS — DIASTOLIC BLOOD PRESSURE: 54 MMHG | SYSTOLIC BLOOD PRESSURE: 88 MMHG

## 2022-10-31 VITALS — DIASTOLIC BLOOD PRESSURE: 62 MMHG | SYSTOLIC BLOOD PRESSURE: 102 MMHG

## 2022-10-31 LAB
ALBUMIN SERPL-MCNC: 2.6 G/DL (ref 3.5–5)
ALT SERPL-CCNC: 10 U/L (ref 12–78)
AST SERPL-CCNC: 17 U/L (ref 10–37)
BASOPHILS NFR BLD AUTO: 0.3 % (ref 0–5)
BUN SERPL-MCNC: 12 MG/DL (ref 7–18)
CHLORIDE SERPL-SCNC: 103 MMOL/L (ref 101–111)
CO2 SERPL-SCNC: 30 MMOL/L (ref 21–32)
CREAT SERPL-MCNC: 0.7 MG/DL (ref 0.5–1.5)
EOSINOPHIL NFR BLD AUTO: 0.6 % (ref 0–8)
ERYTHROCYTE [DISTWIDTH] IN BLOOD BY AUTOMATED COUNT: 14.7 % (ref 11–15.5)
ERYTHROCYTE [SEDIMENTATION RATE] IN BLOOD BY WESTERGREN METHOD: 73 MM/HR (ref 0–20)
GFR SERPL CREATININE-BSD FRML MDRD: 122 ML/MIN (ref 60–?)
GLUCOSE SERPL-MCNC: 68 MG/DL (ref 70–105)
HBA1C MFR BLD: 10.4 % (ref 4–6)
HCT VFR BLD AUTO: 31.3 % (ref 42–54)
LYMPHOCYTES NFR SPEC AUTO: 12.5 % (ref 21–51)
MCH RBC QN AUTO: 33.2 PG (ref 27–33)
MCHC RBC AUTO-ENTMCNC: 34.5 G/DL (ref 32–36)
MCV RBC AUTO: 96.3 FL (ref 79–99)
MONOCYTES NFR BLD AUTO: 8.1 % (ref 3–13)
NEUTROPHILS NFR BLD AUTO: 77.9 % (ref 40–77)
NRBC BLD MANUAL-RTO: 0 % (ref 0–0.19)
PLAT MORPH BLD: (no result)
PLATELET # BLD AUTO: 66 K/UL (ref 130–400)
POTASSIUM SERPL-SCNC: 3.8 MMOL/L (ref 3.5–5.1)
PROT SERPL-MCNC: 6.2 G/DL (ref 6–8.3)
RBC # BLD AUTO: 3.25 MIL/UL (ref 4.5–6.2)
SODIUM SERPL-SCNC: 138 MMOL/L (ref 136–145)
WBC # BLD AUTO: 3.4 K/UL (ref 4.8–10.8)

## 2022-10-31 RX ADMIN — VANCOMYCIN HYDROCHLORIDE SCH MLS/HR: 1 INJECTION, POWDER, LYOPHILIZED, FOR SOLUTION INTRAVENOUS at 09:37

## 2022-10-31 RX ADMIN — ATORVASTATIN CALCIUM SCH MG: 40 TABLET, FILM COATED ORAL at 21:03

## 2022-10-31 RX ADMIN — FAMOTIDINE SCH MG: 10 INJECTION INTRAVENOUS at 09:36

## 2022-10-31 RX ADMIN — SERTRALINE HYDROCHLORIDE SCH MG: 50 TABLET, FILM COATED ORAL at 21:03

## 2022-10-31 RX ADMIN — VANCOMYCIN HYDROCHLORIDE SCH MLS/HR: 1 INJECTION, POWDER, LYOPHILIZED, FOR SOLUTION INTRAVENOUS at 21:04

## 2022-10-31 RX ADMIN — SODIUM CHLORIDE SCH UNIT: 9 INJECTION, SOLUTION INTRAVENOUS at 21:11

## 2022-10-31 RX ADMIN — SODIUM CHLORIDE SCH MLS/HR: 0.9 INJECTION, SOLUTION INTRAVENOUS at 18:17

## 2022-10-31 RX ADMIN — ENOXAPARIN SODIUM SCH MG: 30 INJECTION SUBCUTANEOUS at 09:00

## 2022-10-31 RX ADMIN — SODIUM CHLORIDE SCH UNIT: 9 INJECTION, SOLUTION INTRAVENOUS at 11:30

## 2022-10-31 RX ADMIN — Medication SCH GM: at 17:55

## 2022-10-31 RX ADMIN — CARVEDILOL SCH MG: 3.12 TABLET, FILM COATED ORAL at 20:24

## 2022-10-31 RX ADMIN — FAMOTIDINE SCH MG: 10 INJECTION INTRAVENOUS at 21:03

## 2022-10-31 RX ADMIN — MORPHINE SULFATE PRN MG: 2 INJECTION, SOLUTION INTRAMUSCULAR; INTRAVENOUS at 22:13

## 2022-10-31 RX ADMIN — SODIUM CHLORIDE SCH UNIT: 9 INJECTION, SOLUTION INTRAVENOUS at 17:56

## 2022-10-31 RX ADMIN — MORPHINE SULFATE PRN MG: 2 INJECTION, SOLUTION INTRAMUSCULAR; INTRAVENOUS at 17:57

## 2022-10-31 RX ADMIN — CHLORTHALIDONE SCH EACH: 50 TABLET ORAL at 20:24

## 2022-10-31 RX ADMIN — SODIUM CHLORIDE SCH UNIT: 9 INJECTION, SOLUTION INTRAVENOUS at 06:04

## 2022-11-01 VITALS — DIASTOLIC BLOOD PRESSURE: 59 MMHG | SYSTOLIC BLOOD PRESSURE: 109 MMHG

## 2022-11-01 VITALS — SYSTOLIC BLOOD PRESSURE: 135 MMHG | DIASTOLIC BLOOD PRESSURE: 80 MMHG

## 2022-11-01 VITALS — SYSTOLIC BLOOD PRESSURE: 127 MMHG | DIASTOLIC BLOOD PRESSURE: 78 MMHG

## 2022-11-01 VITALS — SYSTOLIC BLOOD PRESSURE: 110 MMHG | DIASTOLIC BLOOD PRESSURE: 65 MMHG

## 2022-11-01 VITALS — SYSTOLIC BLOOD PRESSURE: 130 MMHG | DIASTOLIC BLOOD PRESSURE: 79 MMHG

## 2022-11-01 VITALS — DIASTOLIC BLOOD PRESSURE: 62 MMHG | SYSTOLIC BLOOD PRESSURE: 99 MMHG

## 2022-11-01 VITALS — SYSTOLIC BLOOD PRESSURE: 128 MMHG | DIASTOLIC BLOOD PRESSURE: 76 MMHG

## 2022-11-01 LAB
BUN SERPL-MCNC: 14 MG/DL (ref 7–18)
CHLORIDE SERPL-SCNC: 103 MMOL/L (ref 101–111)
CO2 SERPL-SCNC: 29 MMOL/L (ref 21–32)
CREAT SERPL-MCNC: 0.7 MG/DL (ref 0.5–1.5)
ERYTHROCYTE [DISTWIDTH] IN BLOOD BY AUTOMATED COUNT: 14.8 % (ref 11–15.5)
GFR SERPL CREATININE-BSD FRML MDRD: 122 ML/MIN (ref 60–?)
GLUCOSE SERPL-MCNC: 78 MG/DL (ref 70–105)
HCT VFR BLD AUTO: 30.1 % (ref 42–54)
MCH RBC QN AUTO: 33.4 PG (ref 27–33)
MCHC RBC AUTO-ENTMCNC: 33.9 G/DL (ref 32–36)
MCV RBC AUTO: 98.7 FL (ref 79–99)
NRBC BLD MANUAL-RTO: 0 % (ref 0–0.19)
PLATELET # BLD AUTO: 63 K/UL (ref 130–400)
POTASSIUM SERPL-SCNC: 3.9 MMOL/L (ref 3.5–5.1)
RBC # BLD AUTO: 3.05 MIL/UL (ref 4.5–6.2)
SODIUM SERPL-SCNC: 136 MMOL/L (ref 136–145)
WBC # BLD AUTO: 4.1 K/UL (ref 4.8–10.8)

## 2022-11-01 RX ADMIN — SODIUM CHLORIDE SCH UNIT: 9 INJECTION, SOLUTION INTRAVENOUS at 16:30

## 2022-11-01 RX ADMIN — ENOXAPARIN SODIUM SCH MG: 30 INJECTION SUBCUTANEOUS at 08:29

## 2022-11-01 RX ADMIN — VANCOMYCIN HYDROCHLORIDE SCH MLS/HR: 1 INJECTION, POWDER, LYOPHILIZED, FOR SOLUTION INTRAVENOUS at 21:09

## 2022-11-01 RX ADMIN — SODIUM CHLORIDE SCH UNIT: 9 INJECTION, SOLUTION INTRAVENOUS at 21:44

## 2022-11-01 RX ADMIN — Medication SCH GM: at 14:56

## 2022-11-01 RX ADMIN — SODIUM CHLORIDE SCH MLS/HR: 0.9 INJECTION, SOLUTION INTRAVENOUS at 14:56

## 2022-11-01 RX ADMIN — FAMOTIDINE SCH MG: 10 INJECTION INTRAVENOUS at 21:09

## 2022-11-01 RX ADMIN — Medication SCH GM: at 00:28

## 2022-11-01 RX ADMIN — Medication SCH GM: at 08:28

## 2022-11-01 RX ADMIN — MUPIROCIN SCH APPL: 20 OINTMENT TOPICAL at 08:29

## 2022-11-01 RX ADMIN — SODIUM CHLORIDE SCH UNIT: 9 INJECTION, SOLUTION INTRAVENOUS at 11:30

## 2022-11-01 RX ADMIN — CHLORTHALIDONE SCH EACH: 50 TABLET ORAL at 08:29

## 2022-11-01 RX ADMIN — CHLORTHALIDONE SCH EACH: 50 TABLET ORAL at 21:00

## 2022-11-01 RX ADMIN — SERTRALINE HYDROCHLORIDE SCH MG: 50 TABLET, FILM COATED ORAL at 21:09

## 2022-11-01 RX ADMIN — CARVEDILOL SCH MG: 3.12 TABLET, FILM COATED ORAL at 08:29

## 2022-11-01 RX ADMIN — FAMOTIDINE SCH MG: 10 INJECTION INTRAVENOUS at 08:28

## 2022-11-01 RX ADMIN — SODIUM CHLORIDE SCH UNIT: 9 INJECTION, SOLUTION INTRAVENOUS at 05:54

## 2022-11-01 RX ADMIN — CARVEDILOL SCH MG: 3.12 TABLET, FILM COATED ORAL at 21:10

## 2022-11-01 RX ADMIN — Medication SCH APPL: at 08:31

## 2022-11-01 RX ADMIN — ATORVASTATIN CALCIUM SCH MG: 40 TABLET, FILM COATED ORAL at 21:09

## 2022-11-01 RX ADMIN — VANCOMYCIN HYDROCHLORIDE SCH MLS/HR: 1 INJECTION, POWDER, LYOPHILIZED, FOR SOLUTION INTRAVENOUS at 08:28

## 2022-11-02 VITALS — DIASTOLIC BLOOD PRESSURE: 67 MMHG | SYSTOLIC BLOOD PRESSURE: 110 MMHG

## 2022-11-02 VITALS — DIASTOLIC BLOOD PRESSURE: 62 MMHG | SYSTOLIC BLOOD PRESSURE: 102 MMHG

## 2022-11-02 VITALS — DIASTOLIC BLOOD PRESSURE: 76 MMHG | SYSTOLIC BLOOD PRESSURE: 126 MMHG

## 2022-11-02 VITALS — DIASTOLIC BLOOD PRESSURE: 86 MMHG | SYSTOLIC BLOOD PRESSURE: 131 MMHG

## 2022-11-02 VITALS — DIASTOLIC BLOOD PRESSURE: 80 MMHG | SYSTOLIC BLOOD PRESSURE: 122 MMHG

## 2022-11-02 VITALS — DIASTOLIC BLOOD PRESSURE: 73 MMHG | SYSTOLIC BLOOD PRESSURE: 92 MMHG

## 2022-11-02 LAB
BUN SERPL-MCNC: 14 MG/DL (ref 7–18)
CHLORIDE SERPL-SCNC: 100 MMOL/L (ref 101–111)
CO2 SERPL-SCNC: 28 MMOL/L (ref 21–32)
CREAT SERPL-MCNC: 0.9 MG/DL (ref 0.5–1.5)
ERYTHROCYTE [DISTWIDTH] IN BLOOD BY AUTOMATED COUNT: 14.5 % (ref 11–15.5)
GFR SERPL CREATININE-BSD FRML MDRD: 91 ML/MIN (ref 60–?)
GLUCOSE SERPL-MCNC: 215 MG/DL (ref 70–105)
HCT VFR BLD AUTO: 29.6 % (ref 42–54)
MCH RBC QN AUTO: 32.1 PG (ref 27–33)
MCHC RBC AUTO-ENTMCNC: 33.1 G/DL (ref 32–36)
MCV RBC AUTO: 97 FL (ref 79–99)
NRBC BLD MANUAL-RTO: 0 % (ref 0–0.19)
PLATELET # BLD AUTO: 75 K/UL (ref 130–400)
POTASSIUM SERPL-SCNC: 3.8 MMOL/L (ref 3.5–5.1)
RBC # BLD AUTO: 3.05 MIL/UL (ref 4.5–6.2)
SODIUM SERPL-SCNC: 133 MMOL/L (ref 136–145)
WBC # BLD AUTO: 3.4 K/UL (ref 4.8–10.8)

## 2022-11-02 RX ADMIN — SODIUM CHLORIDE SCH UNIT: 9 INJECTION, SOLUTION INTRAVENOUS at 08:51

## 2022-11-02 RX ADMIN — FAMOTIDINE SCH MG: 10 INJECTION INTRAVENOUS at 08:51

## 2022-11-02 RX ADMIN — CARVEDILOL SCH MG: 3.12 TABLET, FILM COATED ORAL at 20:42

## 2022-11-02 RX ADMIN — SODIUM CHLORIDE SCH UNIT: 9 INJECTION, SOLUTION INTRAVENOUS at 21:15

## 2022-11-02 RX ADMIN — Medication SCH APPL: at 09:00

## 2022-11-02 RX ADMIN — CHLORTHALIDONE SCH EACH: 50 TABLET ORAL at 22:10

## 2022-11-02 RX ADMIN — FAMOTIDINE SCH MG: 10 INJECTION INTRAVENOUS at 20:42

## 2022-11-02 RX ADMIN — CARVEDILOL SCH MG: 3.12 TABLET, FILM COATED ORAL at 09:00

## 2022-11-02 RX ADMIN — SERTRALINE HYDROCHLORIDE SCH MG: 50 TABLET, FILM COATED ORAL at 20:41

## 2022-11-02 RX ADMIN — ENOXAPARIN SODIUM SCH MG: 30 INJECTION SUBCUTANEOUS at 09:20

## 2022-11-02 RX ADMIN — ATORVASTATIN CALCIUM SCH MG: 40 TABLET, FILM COATED ORAL at 20:41

## 2022-11-02 RX ADMIN — VANCOMYCIN HYDROCHLORIDE SCH MLS/HR: 1 INJECTION, POWDER, LYOPHILIZED, FOR SOLUTION INTRAVENOUS at 08:53

## 2022-11-02 RX ADMIN — MUPIROCIN SCH APPL: 20 OINTMENT TOPICAL at 11:00

## 2022-11-02 RX ADMIN — SODIUM CHLORIDE SCH MLS/HR: 0.9 INJECTION, SOLUTION INTRAVENOUS at 11:19

## 2022-11-02 RX ADMIN — Medication SCH GM: at 23:14

## 2022-11-02 RX ADMIN — Medication SCH GM: at 00:25

## 2022-11-02 RX ADMIN — Medication SCH GM: at 16:04

## 2022-11-02 RX ADMIN — Medication SCH GM: at 08:49

## 2022-11-02 RX ADMIN — SODIUM CHLORIDE SCH UNIT: 9 INJECTION, SOLUTION INTRAVENOUS at 17:13

## 2022-11-02 RX ADMIN — CHLORTHALIDONE SCH EACH: 50 TABLET ORAL at 09:00

## 2022-11-02 RX ADMIN — VANCOMYCIN HYDROCHLORIDE SCH MLS/HR: 1 INJECTION, POWDER, LYOPHILIZED, FOR SOLUTION INTRAVENOUS at 20:42

## 2022-11-02 RX ADMIN — SODIUM CHLORIDE SCH UNIT: 9 INJECTION, SOLUTION INTRAVENOUS at 11:19

## 2022-11-02 RX ADMIN — CHLORTHALIDONE SCH EACH: 50 TABLET ORAL at 09:21

## 2022-11-02 RX ADMIN — KETOROLAC TROMETHAMINE PRN MG: 15 INJECTION, SOLUTION INTRAMUSCULAR; INTRAVENOUS at 22:49

## 2022-11-03 VITALS — DIASTOLIC BLOOD PRESSURE: 91 MMHG | SYSTOLIC BLOOD PRESSURE: 139 MMHG

## 2022-11-03 VITALS — SYSTOLIC BLOOD PRESSURE: 95 MMHG | DIASTOLIC BLOOD PRESSURE: 56 MMHG

## 2022-11-03 VITALS — DIASTOLIC BLOOD PRESSURE: 75 MMHG | SYSTOLIC BLOOD PRESSURE: 123 MMHG

## 2022-11-03 VITALS — DIASTOLIC BLOOD PRESSURE: 86 MMHG | SYSTOLIC BLOOD PRESSURE: 140 MMHG

## 2022-11-03 VITALS — SYSTOLIC BLOOD PRESSURE: 98 MMHG | DIASTOLIC BLOOD PRESSURE: 63 MMHG

## 2022-11-03 VITALS — SYSTOLIC BLOOD PRESSURE: 145 MMHG | DIASTOLIC BLOOD PRESSURE: 100 MMHG

## 2022-11-03 LAB
BUN SERPL-MCNC: 13 MG/DL (ref 7–18)
CHLORIDE SERPL-SCNC: 104 MMOL/L (ref 101–111)
CO2 SERPL-SCNC: 25 MMOL/L (ref 21–32)
CREAT SERPL-MCNC: 0.8 MG/DL (ref 0.5–1.5)
ERYTHROCYTE [DISTWIDTH] IN BLOOD BY AUTOMATED COUNT: 14.3 % (ref 11–15.5)
GFR SERPL CREATININE-BSD FRML MDRD: 104 ML/MIN (ref 60–?)
GLUCOSE SERPL-MCNC: 174 MG/DL (ref 70–105)
HCT VFR BLD AUTO: 27.6 % (ref 42–54)
MCH RBC QN AUTO: 33 PG (ref 27–33)
MCHC RBC AUTO-ENTMCNC: 34.4 G/DL (ref 32–36)
MCV RBC AUTO: 95.8 FL (ref 79–99)
NRBC BLD MANUAL-RTO: 0 % (ref 0–0.19)
PLATELET # BLD AUTO: 68 K/UL (ref 130–400)
POTASSIUM SERPL-SCNC: 3.4 MMOL/L (ref 3.5–5.1)
RBC # BLD AUTO: 2.88 MIL/UL (ref 4.5–6.2)
SODIUM SERPL-SCNC: 136 MMOL/L (ref 136–145)
WBC # BLD AUTO: 3.5 K/UL (ref 4.8–10.8)

## 2022-11-03 RX ADMIN — SERTRALINE HYDROCHLORIDE SCH MG: 50 TABLET, FILM COATED ORAL at 20:51

## 2022-11-03 RX ADMIN — ENOXAPARIN SODIUM SCH MG: 30 INJECTION SUBCUTANEOUS at 08:31

## 2022-11-03 RX ADMIN — KETOROLAC TROMETHAMINE PRN MG: 15 INJECTION, SOLUTION INTRAMUSCULAR; INTRAVENOUS at 20:49

## 2022-11-03 RX ADMIN — SODIUM CHLORIDE SCH UNIT: 9 INJECTION, SOLUTION INTRAVENOUS at 06:34

## 2022-11-03 RX ADMIN — SODIUM CHLORIDE SCH UNIT: 9 INJECTION, SOLUTION INTRAVENOUS at 16:46

## 2022-11-03 RX ADMIN — FAMOTIDINE SCH MG: 10 INJECTION INTRAVENOUS at 08:30

## 2022-11-03 RX ADMIN — CHLORTHALIDONE SCH EACH: 50 TABLET ORAL at 08:31

## 2022-11-03 RX ADMIN — CHLORTHALIDONE SCH EACH: 50 TABLET ORAL at 21:00

## 2022-11-03 RX ADMIN — SODIUM CHLORIDE SCH UNIT: 9 INJECTION, SOLUTION INTRAVENOUS at 20:45

## 2022-11-03 RX ADMIN — FAMOTIDINE SCH MG: 10 INJECTION INTRAVENOUS at 20:47

## 2022-11-03 RX ADMIN — Medication SCH GM: at 08:30

## 2022-11-03 RX ADMIN — MUPIROCIN SCH APPL: 20 OINTMENT TOPICAL at 10:00

## 2022-11-03 RX ADMIN — Medication SCH APPL: at 09:00

## 2022-11-03 RX ADMIN — Medication SCH GM: at 15:01

## 2022-11-03 RX ADMIN — ATORVASTATIN CALCIUM SCH MG: 40 TABLET, FILM COATED ORAL at 20:47

## 2022-11-03 RX ADMIN — CARVEDILOL SCH MG: 3.12 TABLET, FILM COATED ORAL at 20:48

## 2022-11-03 RX ADMIN — CARVEDILOL SCH MG: 3.12 TABLET, FILM COATED ORAL at 08:31

## 2022-11-03 RX ADMIN — VANCOMYCIN HYDROCHLORIDE SCH MLS/HR: 1 INJECTION, POWDER, LYOPHILIZED, FOR SOLUTION INTRAVENOUS at 08:30

## 2022-11-03 RX ADMIN — VANCOMYCIN HYDROCHLORIDE SCH MLS/HR: 1 INJECTION, POWDER, LYOPHILIZED, FOR SOLUTION INTRAVENOUS at 20:46

## 2022-11-03 RX ADMIN — SODIUM CHLORIDE SCH UNIT: 9 INJECTION, SOLUTION INTRAVENOUS at 12:03

## 2022-11-03 RX ADMIN — SODIUM CHLORIDE SCH MLS/HR: 0.9 INJECTION, SOLUTION INTRAVENOUS at 08:30

## 2022-11-04 VITALS — SYSTOLIC BLOOD PRESSURE: 140 MMHG | DIASTOLIC BLOOD PRESSURE: 82 MMHG

## 2022-11-04 VITALS — DIASTOLIC BLOOD PRESSURE: 73 MMHG | SYSTOLIC BLOOD PRESSURE: 136 MMHG

## 2022-11-04 VITALS — SYSTOLIC BLOOD PRESSURE: 132 MMHG | DIASTOLIC BLOOD PRESSURE: 85 MMHG

## 2022-11-04 VITALS — SYSTOLIC BLOOD PRESSURE: 129 MMHG | DIASTOLIC BLOOD PRESSURE: 83 MMHG

## 2022-11-04 VITALS — SYSTOLIC BLOOD PRESSURE: 141 MMHG | DIASTOLIC BLOOD PRESSURE: 84 MMHG

## 2022-11-04 LAB
ALBUMIN SERPL-MCNC: 2.4 G/DL (ref 3.5–5)
ALT SERPL-CCNC: 14 U/L (ref 12–78)
AST SERPL-CCNC: 14 U/L (ref 10–37)
BUN SERPL-MCNC: 11 MG/DL (ref 7–18)
CHLORIDE SERPL-SCNC: 104 MMOL/L (ref 101–111)
CO2 SERPL-SCNC: 27 MMOL/L (ref 21–32)
CREAT SERPL-MCNC: 0.7 MG/DL (ref 0.5–1.5)
ERYTHROCYTE [DISTWIDTH] IN BLOOD BY AUTOMATED COUNT: 14.2 % (ref 11–15.5)
GFR SERPL CREATININE-BSD FRML MDRD: 122 ML/MIN (ref 60–?)
GLUCOSE SERPL-MCNC: 115 MG/DL (ref 70–105)
HCT VFR BLD AUTO: 27.6 % (ref 42–54)
INR PPP: 1.05 (ref 0.85–1.15)
MCH RBC QN AUTO: 33.1 PG (ref 27–33)
MCHC RBC AUTO-ENTMCNC: 34.4 G/DL (ref 32–36)
MCV RBC AUTO: 96.2 FL (ref 79–99)
NRBC BLD MANUAL-RTO: 0 % (ref 0–0.19)
PLATELET # BLD AUTO: 77 K/UL (ref 130–400)
POTASSIUM SERPL-SCNC: 3.2 MMOL/L (ref 3.5–5.1)
PROT SERPL-MCNC: 6.1 G/DL (ref 6–8.3)
PROTHROMBIN TIME: 11.4 SEC (ref 9.6–11.6)
RBC # BLD AUTO: 2.87 MIL/UL (ref 4.5–6.2)
SODIUM SERPL-SCNC: 138 MMOL/L (ref 136–145)
WBC # BLD AUTO: 3.2 K/UL (ref 4.8–10.8)

## 2022-11-04 RX ADMIN — CHLORTHALIDONE SCH EACH: 50 TABLET ORAL at 20:15

## 2022-11-04 RX ADMIN — POTASSIUM CHLORIDE PRN MEQ: 1500 TABLET, EXTENDED RELEASE ORAL at 20:17

## 2022-11-04 RX ADMIN — SODIUM CHLORIDE SCH UNIT: 9 INJECTION, SOLUTION INTRAVENOUS at 06:03

## 2022-11-04 RX ADMIN — CARVEDILOL SCH MG: 3.12 TABLET, FILM COATED ORAL at 20:15

## 2022-11-04 RX ADMIN — Medication SCH GM: at 09:42

## 2022-11-04 RX ADMIN — ATORVASTATIN CALCIUM SCH MG: 40 TABLET, FILM COATED ORAL at 20:16

## 2022-11-04 RX ADMIN — FAMOTIDINE SCH MG: 10 INJECTION INTRAVENOUS at 20:15

## 2022-11-04 RX ADMIN — POTASSIUM CHLORIDE PRN MEQ: 1500 TABLET, EXTENDED RELEASE ORAL at 21:17

## 2022-11-04 RX ADMIN — SODIUM CHLORIDE SCH UNIT: 9 INJECTION, SOLUTION INTRAVENOUS at 21:16

## 2022-11-04 RX ADMIN — Medication SCH GM: at 00:07

## 2022-11-04 RX ADMIN — SERTRALINE HYDROCHLORIDE SCH MG: 50 TABLET, FILM COATED ORAL at 20:15

## 2022-11-04 RX ADMIN — CHLORTHALIDONE SCH EACH: 50 TABLET ORAL at 09:00

## 2022-11-04 RX ADMIN — Medication SCH GM: at 20:16

## 2022-11-04 RX ADMIN — Medication SCH GM: at 15:02

## 2022-11-04 RX ADMIN — MORPHINE SULFATE PRN MG: 2 INJECTION, SOLUTION INTRAMUSCULAR; INTRAVENOUS at 21:07

## 2022-11-04 RX ADMIN — MUPIROCIN SCH APPL: 20 OINTMENT TOPICAL at 09:42

## 2022-11-04 RX ADMIN — VANCOMYCIN HYDROCHLORIDE SCH MLS/HR: 1 INJECTION, POWDER, LYOPHILIZED, FOR SOLUTION INTRAVENOUS at 09:41

## 2022-11-04 RX ADMIN — CARVEDILOL SCH MG: 3.12 TABLET, FILM COATED ORAL at 09:40

## 2022-11-04 RX ADMIN — SODIUM CHLORIDE SCH UNIT: 9 INJECTION, SOLUTION INTRAVENOUS at 17:11

## 2022-11-04 RX ADMIN — CHLORTHALIDONE SCH EACH: 50 TABLET ORAL at 21:00

## 2022-11-04 RX ADMIN — SODIUM CHLORIDE SCH MLS/HR: 0.9 INJECTION, SOLUTION INTRAVENOUS at 02:00

## 2022-11-04 RX ADMIN — FAMOTIDINE SCH MG: 10 INJECTION INTRAVENOUS at 09:40

## 2022-11-04 RX ADMIN — ENOXAPARIN SODIUM SCH MG: 30 INJECTION SUBCUTANEOUS at 08:44

## 2022-11-04 RX ADMIN — VANCOMYCIN HYDROCHLORIDE SCH MLS/HR: 1 INJECTION, POWDER, LYOPHILIZED, FOR SOLUTION INTRAVENOUS at 20:14

## 2022-11-04 RX ADMIN — SODIUM CHLORIDE SCH UNIT: 9 INJECTION, SOLUTION INTRAVENOUS at 13:06

## 2022-11-05 VITALS — SYSTOLIC BLOOD PRESSURE: 145 MMHG | DIASTOLIC BLOOD PRESSURE: 83 MMHG

## 2022-11-05 VITALS — DIASTOLIC BLOOD PRESSURE: 75 MMHG | SYSTOLIC BLOOD PRESSURE: 123 MMHG

## 2022-11-05 VITALS — SYSTOLIC BLOOD PRESSURE: 121 MMHG | DIASTOLIC BLOOD PRESSURE: 73 MMHG

## 2022-11-05 VITALS — DIASTOLIC BLOOD PRESSURE: 97 MMHG | SYSTOLIC BLOOD PRESSURE: 159 MMHG

## 2022-11-05 VITALS — DIASTOLIC BLOOD PRESSURE: 92 MMHG | SYSTOLIC BLOOD PRESSURE: 145 MMHG

## 2022-11-05 VITALS — DIASTOLIC BLOOD PRESSURE: 78 MMHG | SYSTOLIC BLOOD PRESSURE: 135 MMHG

## 2022-11-05 LAB
BUN SERPL-MCNC: 12 MG/DL (ref 7–18)
CHLORIDE SERPL-SCNC: 104 MMOL/L (ref 101–111)
CO2 SERPL-SCNC: 26 MMOL/L (ref 21–32)
CREAT SERPL-MCNC: 0.7 MG/DL (ref 0.5–1.5)
ERYTHROCYTE [DISTWIDTH] IN BLOOD BY AUTOMATED COUNT: 14.4 % (ref 11–15.5)
GFR SERPL CREATININE-BSD FRML MDRD: 122 ML/MIN (ref 60–?)
GLUCOSE SERPL-MCNC: 155 MG/DL (ref 70–105)
HCT VFR BLD AUTO: 27.8 % (ref 42–54)
MAGNESIUM SERPL-MCNC: 1.8 MG/DL (ref 1.8–2.4)
MCH RBC QN AUTO: 32.9 PG (ref 27–33)
MCHC RBC AUTO-ENTMCNC: 34.5 G/DL (ref 32–36)
MCV RBC AUTO: 95.2 FL (ref 79–99)
NRBC BLD MANUAL-RTO: 0 % (ref 0–0.19)
PLATELET # BLD AUTO: 78 K/UL (ref 130–400)
POTASSIUM SERPL-SCNC: 3.4 MMOL/L (ref 3.5–5.1)
RBC # BLD AUTO: 2.92 MIL/UL (ref 4.5–6.2)
SODIUM SERPL-SCNC: 137 MMOL/L (ref 136–145)
WBC # BLD AUTO: 3.6 K/UL (ref 4.8–10.8)

## 2022-11-05 RX ADMIN — SODIUM CHLORIDE SCH MLS/HR: 0.9 INJECTION, SOLUTION INTRAVENOUS at 04:58

## 2022-11-05 RX ADMIN — CHLORTHALIDONE SCH EACH: 50 TABLET ORAL at 09:00

## 2022-11-05 RX ADMIN — FAMOTIDINE SCH MG: 10 INJECTION INTRAVENOUS at 21:32

## 2022-11-05 RX ADMIN — CHLORTHALIDONE SCH EACH: 50 TABLET ORAL at 21:44

## 2022-11-05 RX ADMIN — FAMOTIDINE SCH MG: 10 INJECTION INTRAVENOUS at 08:28

## 2022-11-05 RX ADMIN — ATORVASTATIN CALCIUM SCH MG: 40 TABLET, FILM COATED ORAL at 21:31

## 2022-11-05 RX ADMIN — POTASSIUM CHLORIDE PRN MEQ: 1500 TABLET, EXTENDED RELEASE ORAL at 05:55

## 2022-11-05 RX ADMIN — Medication SCH GM: at 04:58

## 2022-11-05 RX ADMIN — SODIUM CHLORIDE SCH UNIT: 9 INJECTION, SOLUTION INTRAVENOUS at 11:30

## 2022-11-05 RX ADMIN — SODIUM CHLORIDE SCH UNIT: 9 INJECTION, SOLUTION INTRAVENOUS at 05:12

## 2022-11-05 RX ADMIN — SERTRALINE HYDROCHLORIDE SCH MG: 50 TABLET, FILM COATED ORAL at 21:30

## 2022-11-05 RX ADMIN — CARVEDILOL SCH MG: 3.12 TABLET, FILM COATED ORAL at 21:31

## 2022-11-05 RX ADMIN — SODIUM CHLORIDE PRN MG: 9 INJECTION, SOLUTION INTRAVENOUS at 08:27

## 2022-11-05 RX ADMIN — SODIUM CHLORIDE SCH MLS/HR: 0.9 INJECTION, SOLUTION INTRAVENOUS at 21:36

## 2022-11-05 RX ADMIN — SODIUM CHLORIDE SCH UNIT: 9 INJECTION, SOLUTION INTRAVENOUS at 21:34

## 2022-11-05 RX ADMIN — MUPIROCIN SCH APPL: 20 OINTMENT TOPICAL at 05:00

## 2022-11-05 RX ADMIN — VANCOMYCIN HYDROCHLORIDE SCH MLS/HR: 1 INJECTION, POWDER, LYOPHILIZED, FOR SOLUTION INTRAVENOUS at 08:28

## 2022-11-05 RX ADMIN — POTASSIUM CHLORIDE PRN MLS/HR: 14.9 INJECTION, SOLUTION INTRAVENOUS at 07:16

## 2022-11-05 RX ADMIN — CARVEDILOL SCH MG: 3.12 TABLET, FILM COATED ORAL at 08:27

## 2022-11-05 RX ADMIN — SODIUM CHLORIDE PRN MG: 9 INJECTION, SOLUTION INTRAVENOUS at 21:46

## 2022-11-05 RX ADMIN — VANCOMYCIN HYDROCHLORIDE SCH MLS/HR: 1 INJECTION, POWDER, LYOPHILIZED, FOR SOLUTION INTRAVENOUS at 21:32

## 2022-11-05 RX ADMIN — ENOXAPARIN SODIUM SCH MG: 30 INJECTION SUBCUTANEOUS at 08:29

## 2022-11-05 RX ADMIN — SODIUM CHLORIDE SCH UNIT: 9 INJECTION, SOLUTION INTRAVENOUS at 16:30

## 2022-11-05 RX ADMIN — Medication SCH GM: at 14:55

## 2022-11-06 VITALS — DIASTOLIC BLOOD PRESSURE: 81 MMHG | SYSTOLIC BLOOD PRESSURE: 125 MMHG

## 2022-11-06 VITALS — SYSTOLIC BLOOD PRESSURE: 135 MMHG | DIASTOLIC BLOOD PRESSURE: 79 MMHG

## 2022-11-06 VITALS — SYSTOLIC BLOOD PRESSURE: 97 MMHG | DIASTOLIC BLOOD PRESSURE: 62 MMHG

## 2022-11-06 VITALS — DIASTOLIC BLOOD PRESSURE: 75 MMHG | SYSTOLIC BLOOD PRESSURE: 116 MMHG

## 2022-11-06 VITALS — DIASTOLIC BLOOD PRESSURE: 59 MMHG | SYSTOLIC BLOOD PRESSURE: 103 MMHG

## 2022-11-06 VITALS — SYSTOLIC BLOOD PRESSURE: 108 MMHG | DIASTOLIC BLOOD PRESSURE: 72 MMHG

## 2022-11-06 VITALS — DIASTOLIC BLOOD PRESSURE: 64 MMHG | SYSTOLIC BLOOD PRESSURE: 102 MMHG

## 2022-11-06 LAB
BASOPHILS NFR BLD AUTO: 0.6 % (ref 0–5)
BUN SERPL-MCNC: 16 MG/DL (ref 7–18)
CHLORIDE SERPL-SCNC: 102 MMOL/L (ref 101–111)
CO2 SERPL-SCNC: 22 MMOL/L (ref 21–32)
CREAT SERPL-MCNC: 0.9 MG/DL (ref 0.5–1.5)
EOSINOPHIL NFR BLD AUTO: 0.2 % (ref 0–8)
ERYTHROCYTE [DISTWIDTH] IN BLOOD BY AUTOMATED COUNT: 14.3 % (ref 11–15.5)
GFR SERPL CREATININE-BSD FRML MDRD: 91 ML/MIN (ref 60–?)
GLUCOSE SERPL-MCNC: 240 MG/DL (ref 70–105)
HCT VFR BLD AUTO: 28.5 % (ref 42–54)
LYMPHOCYTES NFR SPEC AUTO: 28.5 % (ref 21–51)
MCH RBC QN AUTO: 33 PG (ref 27–33)
MCHC RBC AUTO-ENTMCNC: 34.7 G/DL (ref 32–36)
MCV RBC AUTO: 95 FL (ref 79–99)
MONOCYTES NFR BLD AUTO: 16.8 % (ref 3–13)
NEUTROPHILS NFR BLD AUTO: 53.7 % (ref 40–77)
NRBC BLD MANUAL-RTO: 0 % (ref 0–0.19)
PLATELET # BLD AUTO: 94 K/UL (ref 130–400)
POTASSIUM SERPL-SCNC: 3.8 MMOL/L (ref 3.5–5.1)
RBC # BLD AUTO: 3 MIL/UL (ref 4.5–6.2)
SODIUM SERPL-SCNC: 136 MMOL/L (ref 136–145)
WBC # BLD AUTO: 5.2 K/UL (ref 4.8–10.8)

## 2022-11-06 RX ADMIN — SERTRALINE HYDROCHLORIDE SCH MG: 50 TABLET, FILM COATED ORAL at 21:41

## 2022-11-06 RX ADMIN — ENOXAPARIN SODIUM SCH MG: 30 INJECTION SUBCUTANEOUS at 10:13

## 2022-11-06 RX ADMIN — CHLORTHALIDONE SCH EACH: 50 TABLET ORAL at 10:37

## 2022-11-06 RX ADMIN — Medication SCH GM: at 06:42

## 2022-11-06 RX ADMIN — Medication SCH GM: at 00:10

## 2022-11-06 RX ADMIN — CARVEDILOL SCH MG: 3.12 TABLET, FILM COATED ORAL at 10:12

## 2022-11-06 RX ADMIN — SODIUM CHLORIDE SCH UNIT: 9 INJECTION, SOLUTION INTRAVENOUS at 21:31

## 2022-11-06 RX ADMIN — SODIUM CHLORIDE SCH MLS/HR: 0.9 INJECTION, SOLUTION INTRAVENOUS at 17:19

## 2022-11-06 RX ADMIN — CHLORTHALIDONE SCH EACH: 50 TABLET ORAL at 21:50

## 2022-11-06 RX ADMIN — SODIUM CHLORIDE SCH UNIT: 9 INJECTION, SOLUTION INTRAVENOUS at 16:30

## 2022-11-06 RX ADMIN — FAMOTIDINE SCH MG: 10 INJECTION INTRAVENOUS at 21:40

## 2022-11-06 RX ADMIN — VANCOMYCIN HYDROCHLORIDE SCH MLS/HR: 1 INJECTION, POWDER, LYOPHILIZED, FOR SOLUTION INTRAVENOUS at 21:40

## 2022-11-06 RX ADMIN — VANCOMYCIN HYDROCHLORIDE SCH MLS/HR: 1 INJECTION, POWDER, LYOPHILIZED, FOR SOLUTION INTRAVENOUS at 10:12

## 2022-11-06 RX ADMIN — Medication SCH GM: at 17:19

## 2022-11-06 RX ADMIN — SODIUM CHLORIDE SCH UNIT: 9 INJECTION, SOLUTION INTRAVENOUS at 06:45

## 2022-11-06 RX ADMIN — FAMOTIDINE SCH MG: 10 INJECTION INTRAVENOUS at 10:13

## 2022-11-06 RX ADMIN — CARVEDILOL SCH MG: 3.12 TABLET, FILM COATED ORAL at 21:41

## 2022-11-06 RX ADMIN — SODIUM CHLORIDE SCH UNIT: 9 INJECTION, SOLUTION INTRAVENOUS at 12:02

## 2022-11-06 RX ADMIN — ATORVASTATIN CALCIUM SCH MG: 40 TABLET, FILM COATED ORAL at 21:40

## 2022-11-06 RX ADMIN — MUPIROCIN SCH APPL: 20 OINTMENT TOPICAL at 10:13

## 2022-11-06 RX ADMIN — CHLORTHALIDONE SCH EACH: 50 TABLET ORAL at 09:00

## 2022-11-07 VITALS — DIASTOLIC BLOOD PRESSURE: 92 MMHG | SYSTOLIC BLOOD PRESSURE: 136 MMHG

## 2022-11-07 VITALS — DIASTOLIC BLOOD PRESSURE: 65 MMHG | SYSTOLIC BLOOD PRESSURE: 101 MMHG

## 2022-11-07 VITALS — SYSTOLIC BLOOD PRESSURE: 132 MMHG | DIASTOLIC BLOOD PRESSURE: 47 MMHG

## 2022-11-07 VITALS — SYSTOLIC BLOOD PRESSURE: 122 MMHG | DIASTOLIC BLOOD PRESSURE: 76 MMHG

## 2022-11-07 VITALS — SYSTOLIC BLOOD PRESSURE: 106 MMHG | DIASTOLIC BLOOD PRESSURE: 66 MMHG

## 2022-11-07 LAB
BASOPHILS NFR BLD AUTO: 0.6 % (ref 0–5)
BUN SERPL-MCNC: 14 MG/DL (ref 7–18)
CHLORIDE SERPL-SCNC: 104 MMOL/L (ref 101–111)
CO2 SERPL-SCNC: 28 MMOL/L (ref 21–32)
CREAT SERPL-MCNC: 0.8 MG/DL (ref 0.5–1.5)
EOSINOPHIL NFR BLD AUTO: 1.1 % (ref 0–8)
ERYTHROCYTE [DISTWIDTH] IN BLOOD BY AUTOMATED COUNT: 14.6 % (ref 11–15.5)
GFR SERPL CREATININE-BSD FRML MDRD: 104 ML/MIN (ref 60–?)
GLUCOSE SERPL-MCNC: 189 MG/DL (ref 70–105)
HCT VFR BLD AUTO: 27.8 % (ref 42–54)
LYMPHOCYTES NFR SPEC AUTO: 38.7 % (ref 21–51)
MCH RBC QN AUTO: 32.8 PG (ref 27–33)
MCHC RBC AUTO-ENTMCNC: 34.2 G/DL (ref 32–36)
MCV RBC AUTO: 95.9 FL (ref 79–99)
MONOCYTES NFR BLD AUTO: 17.9 % (ref 3–13)
NEUTROPHILS NFR BLD AUTO: 41.7 % (ref 40–77)
NRBC BLD MANUAL-RTO: 0 % (ref 0–0.19)
PLATELET # BLD AUTO: 89 K/UL (ref 130–400)
POTASSIUM SERPL-SCNC: 3.2 MMOL/L (ref 3.5–5.1)
RBC # BLD AUTO: 2.9 MIL/UL (ref 4.5–6.2)
SODIUM SERPL-SCNC: 137 MMOL/L (ref 136–145)
WBC # BLD AUTO: 3.5 K/UL (ref 4.8–10.8)

## 2022-11-07 RX ADMIN — VANCOMYCIN HYDROCHLORIDE SCH MLS/HR: 1 INJECTION, POWDER, LYOPHILIZED, FOR SOLUTION INTRAVENOUS at 08:41

## 2022-11-07 RX ADMIN — CARVEDILOL SCH MG: 3.12 TABLET, FILM COATED ORAL at 08:10

## 2022-11-07 RX ADMIN — SODIUM CHLORIDE SCH UNIT: 9 INJECTION, SOLUTION INTRAVENOUS at 16:38

## 2022-11-07 RX ADMIN — CHLORTHALIDONE SCH EACH: 50 TABLET ORAL at 08:11

## 2022-11-07 RX ADMIN — ENOXAPARIN SODIUM SCH MG: 30 INJECTION SUBCUTANEOUS at 08:11

## 2022-11-07 RX ADMIN — SODIUM CHLORIDE SCH UNIT: 9 INJECTION, SOLUTION INTRAVENOUS at 21:12

## 2022-11-07 RX ADMIN — MUPIROCIN SCH APPL: 20 OINTMENT TOPICAL at 08:01

## 2022-11-07 RX ADMIN — CHLORTHALIDONE SCH EACH: 50 TABLET ORAL at 21:15

## 2022-11-07 RX ADMIN — ATORVASTATIN CALCIUM SCH MG: 40 TABLET, FILM COATED ORAL at 21:15

## 2022-11-07 RX ADMIN — Medication SCH GM: at 08:01

## 2022-11-07 RX ADMIN — Medication SCH GM: at 00:27

## 2022-11-07 RX ADMIN — SODIUM CHLORIDE SCH MLS/HR: 0.9 INJECTION, SOLUTION INTRAVENOUS at 12:00

## 2022-11-07 RX ADMIN — Medication SCH GM: at 23:38

## 2022-11-07 RX ADMIN — SODIUM CHLORIDE SCH UNIT: 9 INJECTION, SOLUTION INTRAVENOUS at 12:02

## 2022-11-07 RX ADMIN — SERTRALINE HYDROCHLORIDE SCH MG: 50 TABLET, FILM COATED ORAL at 21:15

## 2022-11-07 RX ADMIN — SODIUM CHLORIDE SCH MLS/HR: 0.9 INJECTION, SOLUTION INTRAVENOUS at 21:17

## 2022-11-07 RX ADMIN — FAMOTIDINE SCH MG: 10 INJECTION INTRAVENOUS at 21:15

## 2022-11-07 RX ADMIN — VANCOMYCIN HYDROCHLORIDE SCH MLS/HR: 1 INJECTION, POWDER, LYOPHILIZED, FOR SOLUTION INTRAVENOUS at 21:15

## 2022-11-07 RX ADMIN — CARVEDILOL SCH MG: 3.12 TABLET, FILM COATED ORAL at 20:53

## 2022-11-07 RX ADMIN — Medication SCH GM: at 16:34

## 2022-11-07 RX ADMIN — SODIUM CHLORIDE SCH UNIT: 9 INJECTION, SOLUTION INTRAVENOUS at 06:19

## 2022-11-07 RX ADMIN — FAMOTIDINE SCH MG: 10 INJECTION INTRAVENOUS at 08:10

## 2022-11-08 VITALS — DIASTOLIC BLOOD PRESSURE: 76 MMHG | SYSTOLIC BLOOD PRESSURE: 133 MMHG

## 2022-11-08 VITALS — SYSTOLIC BLOOD PRESSURE: 102 MMHG | DIASTOLIC BLOOD PRESSURE: 64 MMHG

## 2022-11-08 VITALS — DIASTOLIC BLOOD PRESSURE: 79 MMHG | SYSTOLIC BLOOD PRESSURE: 128 MMHG

## 2022-11-08 VITALS — DIASTOLIC BLOOD PRESSURE: 67 MMHG | SYSTOLIC BLOOD PRESSURE: 104 MMHG

## 2022-11-08 VITALS — DIASTOLIC BLOOD PRESSURE: 77 MMHG | SYSTOLIC BLOOD PRESSURE: 136 MMHG

## 2022-11-08 VITALS — SYSTOLIC BLOOD PRESSURE: 143 MMHG | DIASTOLIC BLOOD PRESSURE: 82 MMHG

## 2022-11-08 VITALS — DIASTOLIC BLOOD PRESSURE: 58 MMHG | SYSTOLIC BLOOD PRESSURE: 85 MMHG

## 2022-11-08 LAB
BUN SERPL-MCNC: 11 MG/DL (ref 7–18)
CHLORIDE SERPL-SCNC: 102 MMOL/L (ref 101–111)
CO2 SERPL-SCNC: 29 MMOL/L (ref 21–32)
CREAT SERPL-MCNC: 0.7 MG/DL (ref 0.5–1.5)
EOSINOPHIL NFR BLD MANUAL: 1 % (ref 1–6)
ERYTHROCYTE [DISTWIDTH] IN BLOOD BY AUTOMATED COUNT: 14.6 % (ref 11–15.5)
GFR SERPL CREATININE-BSD FRML MDRD: 122 ML/MIN (ref 60–?)
GLUCOSE SERPL-MCNC: 242 MG/DL (ref 70–105)
HCT VFR BLD AUTO: 28.7 % (ref 42–54)
LYMPHOCYTES NFR BLD MANUAL: 32 % (ref 22–44)
MANUAL DIF COMMENT BLD-IMP: (no result)
MCH RBC QN AUTO: 32.9 PG (ref 27–33)
MCHC RBC AUTO-ENTMCNC: 34.1 G/DL (ref 32–36)
MCV RBC AUTO: 96.3 FL (ref 79–99)
MONOCYTES NFR BLD MANUAL: 16 % (ref 2–9)
NEUTS SEG NFR BLD MANUAL: 51 % (ref 40–70)
NRBC BLD MANUAL-RTO: 0 % (ref 0–0.19)
PLAT MORPH BLD: (no result)
PLATELET # BLD AUTO: 84 K/UL (ref 130–400)
POTASSIUM SERPL-SCNC: 3.2 MMOL/L (ref 3.5–5.1)
RBC # BLD AUTO: 2.98 MIL/UL (ref 4.5–6.2)
RBC MORPH BLD: (no result)
SODIUM SERPL-SCNC: 136 MMOL/L (ref 136–145)
WBC # BLD AUTO: 2.3 K/UL (ref 4.8–10.8)

## 2022-11-08 RX ADMIN — POTASSIUM CHLORIDE PRN MLS/HR: 14.9 INJECTION, SOLUTION INTRAVENOUS at 19:07

## 2022-11-08 RX ADMIN — SODIUM CHLORIDE SCH UNIT: 9 INJECTION, SOLUTION INTRAVENOUS at 17:29

## 2022-11-08 RX ADMIN — CARVEDILOL SCH MG: 3.12 TABLET, FILM COATED ORAL at 21:44

## 2022-11-08 RX ADMIN — MUPIROCIN SCH APPL: 20 OINTMENT TOPICAL at 09:14

## 2022-11-08 RX ADMIN — CHLORTHALIDONE SCH EACH: 50 TABLET ORAL at 09:00

## 2022-11-08 RX ADMIN — FAMOTIDINE SCH MG: 10 INJECTION INTRAVENOUS at 09:08

## 2022-11-08 RX ADMIN — Medication SCH GM: at 17:15

## 2022-11-08 RX ADMIN — ATORVASTATIN CALCIUM SCH MG: 40 TABLET, FILM COATED ORAL at 21:44

## 2022-11-08 RX ADMIN — CHLORTHALIDONE SCH EACH: 50 TABLET ORAL at 22:22

## 2022-11-08 RX ADMIN — VANCOMYCIN HYDROCHLORIDE SCH MLS/HR: 1 INJECTION, POWDER, LYOPHILIZED, FOR SOLUTION INTRAVENOUS at 11:15

## 2022-11-08 RX ADMIN — CARVEDILOL SCH MG: 3.12 TABLET, FILM COATED ORAL at 09:09

## 2022-11-08 RX ADMIN — FAMOTIDINE SCH MG: 10 INJECTION INTRAVENOUS at 21:44

## 2022-11-08 RX ADMIN — VANCOMYCIN HYDROCHLORIDE SCH MLS/HR: 1 INJECTION, POWDER, LYOPHILIZED, FOR SOLUTION INTRAVENOUS at 22:22

## 2022-11-08 RX ADMIN — Medication SCH GM: at 09:08

## 2022-11-08 RX ADMIN — SODIUM CHLORIDE SCH UNIT: 9 INJECTION, SOLUTION INTRAVENOUS at 12:27

## 2022-11-08 RX ADMIN — SODIUM CHLORIDE SCH UNIT: 9 INJECTION, SOLUTION INTRAVENOUS at 21:43

## 2022-11-08 RX ADMIN — SERTRALINE HYDROCHLORIDE SCH MG: 50 TABLET, FILM COATED ORAL at 21:44

## 2022-11-08 RX ADMIN — SODIUM CHLORIDE SCH UNIT: 9 INJECTION, SOLUTION INTRAVENOUS at 06:24

## 2022-11-08 RX ADMIN — ENOXAPARIN SODIUM SCH MG: 30 INJECTION SUBCUTANEOUS at 09:00

## 2022-11-08 RX ADMIN — CHLORTHALIDONE SCH EACH: 50 TABLET ORAL at 09:11

## 2022-11-09 VITALS — SYSTOLIC BLOOD PRESSURE: 125 MMHG | DIASTOLIC BLOOD PRESSURE: 78 MMHG

## 2022-11-09 VITALS — SYSTOLIC BLOOD PRESSURE: 123 MMHG | DIASTOLIC BLOOD PRESSURE: 76 MMHG

## 2022-11-09 VITALS — SYSTOLIC BLOOD PRESSURE: 151 MMHG | DIASTOLIC BLOOD PRESSURE: 89 MMHG

## 2022-11-09 VITALS — DIASTOLIC BLOOD PRESSURE: 85 MMHG | SYSTOLIC BLOOD PRESSURE: 138 MMHG

## 2022-11-09 VITALS — SYSTOLIC BLOOD PRESSURE: 134 MMHG | DIASTOLIC BLOOD PRESSURE: 80 MMHG

## 2022-11-09 RX ADMIN — CHLORTHALIDONE SCH EACH: 50 TABLET ORAL at 21:55

## 2022-11-09 RX ADMIN — SODIUM CHLORIDE SCH MLS/HR: 0.9 INJECTION, SOLUTION INTRAVENOUS at 04:00

## 2022-11-09 RX ADMIN — SERTRALINE HYDROCHLORIDE SCH MG: 50 TABLET, FILM COATED ORAL at 21:54

## 2022-11-09 RX ADMIN — VANCOMYCIN HYDROCHLORIDE SCH MLS/HR: 1 INJECTION, POWDER, LYOPHILIZED, FOR SOLUTION INTRAVENOUS at 21:54

## 2022-11-09 RX ADMIN — CARVEDILOL SCH MG: 3.12 TABLET, FILM COATED ORAL at 21:54

## 2022-11-09 RX ADMIN — CHLORTHALIDONE SCH EACH: 50 TABLET ORAL at 09:00

## 2022-11-09 RX ADMIN — SODIUM CHLORIDE SCH MLS/HR: 0.9 INJECTION, SOLUTION INTRAVENOUS at 22:52

## 2022-11-09 RX ADMIN — ATORVASTATIN CALCIUM SCH MG: 40 TABLET, FILM COATED ORAL at 21:54

## 2022-11-09 RX ADMIN — CARVEDILOL SCH MG: 3.12 TABLET, FILM COATED ORAL at 10:18

## 2022-11-09 RX ADMIN — MUPIROCIN SCH APPL: 20 OINTMENT TOPICAL at 10:19

## 2022-11-09 RX ADMIN — SODIUM CHLORIDE SCH UNIT: 9 INJECTION, SOLUTION INTRAVENOUS at 12:12

## 2022-11-09 RX ADMIN — FAMOTIDINE SCH MG: 10 INJECTION INTRAVENOUS at 21:53

## 2022-11-09 RX ADMIN — SODIUM CHLORIDE SCH UNIT: 9 INJECTION, SOLUTION INTRAVENOUS at 22:42

## 2022-11-09 RX ADMIN — SODIUM CHLORIDE SCH UNIT: 9 INJECTION, SOLUTION INTRAVENOUS at 06:00

## 2022-11-09 RX ADMIN — VANCOMYCIN HYDROCHLORIDE SCH MLS/HR: 1 INJECTION, POWDER, LYOPHILIZED, FOR SOLUTION INTRAVENOUS at 10:18

## 2022-11-09 RX ADMIN — Medication SCH GM: at 16:57

## 2022-11-09 RX ADMIN — Medication SCH GM: at 00:27

## 2022-11-09 RX ADMIN — ENOXAPARIN SODIUM SCH MG: 30 INJECTION SUBCUTANEOUS at 08:45

## 2022-11-09 RX ADMIN — Medication SCH GM: at 10:18

## 2022-11-09 RX ADMIN — FAMOTIDINE SCH MG: 10 INJECTION INTRAVENOUS at 10:18

## 2022-11-09 RX ADMIN — SODIUM CHLORIDE SCH UNIT: 9 INJECTION, SOLUTION INTRAVENOUS at 17:19

## 2022-11-10 VITALS — DIASTOLIC BLOOD PRESSURE: 75 MMHG | SYSTOLIC BLOOD PRESSURE: 146 MMHG

## 2022-11-10 VITALS — SYSTOLIC BLOOD PRESSURE: 100 MMHG | DIASTOLIC BLOOD PRESSURE: 62 MMHG

## 2022-11-10 VITALS — SYSTOLIC BLOOD PRESSURE: 105 MMHG | DIASTOLIC BLOOD PRESSURE: 61 MMHG

## 2022-11-10 VITALS — DIASTOLIC BLOOD PRESSURE: 86 MMHG | SYSTOLIC BLOOD PRESSURE: 130 MMHG

## 2022-11-10 VITALS — DIASTOLIC BLOOD PRESSURE: 64 MMHG | SYSTOLIC BLOOD PRESSURE: 105 MMHG

## 2022-11-10 VITALS — SYSTOLIC BLOOD PRESSURE: 141 MMHG | DIASTOLIC BLOOD PRESSURE: 82 MMHG

## 2022-11-10 RX ADMIN — Medication SCH GM: at 08:59

## 2022-11-10 RX ADMIN — SODIUM CHLORIDE SCH UNIT: 9 INJECTION, SOLUTION INTRAVENOUS at 21:05

## 2022-11-10 RX ADMIN — ENOXAPARIN SODIUM SCH MG: 30 INJECTION SUBCUTANEOUS at 07:49

## 2022-11-10 RX ADMIN — FAMOTIDINE SCH MG: 10 INJECTION INTRAVENOUS at 09:00

## 2022-11-10 RX ADMIN — Medication SCH GM: at 16:57

## 2022-11-10 RX ADMIN — FAMOTIDINE SCH MG: 20 TABLET, FILM COATED ORAL at 20:59

## 2022-11-10 RX ADMIN — CARVEDILOL SCH MG: 3.12 TABLET, FILM COATED ORAL at 20:59

## 2022-11-10 RX ADMIN — CARVEDILOL SCH MG: 3.12 TABLET, FILM COATED ORAL at 08:59

## 2022-11-10 RX ADMIN — GABAPENTIN SCH MG: 300 CAPSULE ORAL at 20:59

## 2022-11-10 RX ADMIN — VANCOMYCIN HYDROCHLORIDE SCH MLS/HR: 1 INJECTION, POWDER, LYOPHILIZED, FOR SOLUTION INTRAVENOUS at 08:58

## 2022-11-10 RX ADMIN — SODIUM CHLORIDE SCH UNIT: 9 INJECTION, SOLUTION INTRAVENOUS at 11:59

## 2022-11-10 RX ADMIN — CHLORTHALIDONE SCH EACH: 50 TABLET ORAL at 09:00

## 2022-11-10 RX ADMIN — SODIUM CHLORIDE SCH UNIT: 9 INJECTION, SOLUTION INTRAVENOUS at 17:06

## 2022-11-10 RX ADMIN — MUPIROCIN SCH APPL: 20 OINTMENT TOPICAL at 08:58

## 2022-11-10 RX ADMIN — CHLORTHALIDONE SCH EACH: 50 TABLET ORAL at 20:59

## 2022-11-10 RX ADMIN — Medication SCH GM: at 00:05

## 2022-11-10 RX ADMIN — SERTRALINE HYDROCHLORIDE SCH MG: 50 TABLET, FILM COATED ORAL at 20:59

## 2022-11-10 RX ADMIN — Medication SCH GM: at 21:00

## 2022-11-10 RX ADMIN — POTASSIUM CHLORIDE PRN MLS/HR: 14.9 INJECTION, SOLUTION INTRAVENOUS at 21:04

## 2022-11-10 RX ADMIN — VANCOMYCIN HYDROCHLORIDE SCH MLS/HR: 1 INJECTION, POWDER, LYOPHILIZED, FOR SOLUTION INTRAVENOUS at 20:58

## 2022-11-10 RX ADMIN — ATORVASTATIN CALCIUM SCH MG: 40 TABLET, FILM COATED ORAL at 20:59

## 2022-11-10 RX ADMIN — SODIUM CHLORIDE SCH UNIT: 9 INJECTION, SOLUTION INTRAVENOUS at 06:13

## 2022-11-11 VITALS — SYSTOLIC BLOOD PRESSURE: 114 MMHG | DIASTOLIC BLOOD PRESSURE: 74 MMHG

## 2022-11-11 VITALS — DIASTOLIC BLOOD PRESSURE: 92 MMHG | SYSTOLIC BLOOD PRESSURE: 136 MMHG

## 2022-11-11 VITALS — DIASTOLIC BLOOD PRESSURE: 52 MMHG | SYSTOLIC BLOOD PRESSURE: 78 MMHG

## 2022-11-11 VITALS — DIASTOLIC BLOOD PRESSURE: 61 MMHG | SYSTOLIC BLOOD PRESSURE: 95 MMHG

## 2022-11-11 VITALS — DIASTOLIC BLOOD PRESSURE: 84 MMHG | SYSTOLIC BLOOD PRESSURE: 134 MMHG

## 2022-11-11 VITALS — DIASTOLIC BLOOD PRESSURE: 81 MMHG | SYSTOLIC BLOOD PRESSURE: 129 MMHG

## 2022-11-11 VITALS — SYSTOLIC BLOOD PRESSURE: 129 MMHG | DIASTOLIC BLOOD PRESSURE: 74 MMHG

## 2022-11-11 LAB
BNP SERPL-MCNC: 658 PG/ML (ref 0–100)
BUN SERPL-MCNC: 10 MG/DL (ref 7–18)
CHLORIDE SERPL-SCNC: 103 MMOL/L (ref 101–111)
CO2 SERPL-SCNC: 31 MMOL/L (ref 21–32)
CREAT SERPL-MCNC: 0.7 MG/DL (ref 0.5–1.5)
GFR SERPL CREATININE-BSD FRML MDRD: 122 ML/MIN (ref 60–?)
GLUCOSE SERPL-MCNC: 189 MG/DL (ref 70–105)
IRON SATN MFR SERPL: 36.6 % (ref 30–44)
IRON SERPL-MCNC: 102 MCG/DL (ref 65–175)
MAGNESIUM SERPL-MCNC: 1.6 MG/DL (ref 1.8–2.4)
PHOSPHATE SERPL-MCNC: 2.9 MG/DL (ref 2.5–4.9)
POTASSIUM SERPL-SCNC: 2.9 MMOL/L (ref 3.5–5.1)
RETICS/RBC NFR AUTO: 1.23 % (ref 0.42–2.23)
SODIUM SERPL-SCNC: 139 MMOL/L (ref 136–145)
TIBC SERPL-MCNC: 278 MCG/DL (ref 250–450)
TSH SERPL DL<=0.05 MIU/L-ACNC: 1.35 UIU/ML (ref 0.36–3.74)

## 2022-11-11 RX ADMIN — VANCOMYCIN HYDROCHLORIDE SCH MLS/HR: 1 INJECTION, POWDER, LYOPHILIZED, FOR SOLUTION INTRAVENOUS at 09:18

## 2022-11-11 RX ADMIN — SODIUM CHLORIDE SCH UNIT: 9 INJECTION, SOLUTION INTRAVENOUS at 11:34

## 2022-11-11 RX ADMIN — SODIUM CHLORIDE SCH UNIT: 9 INJECTION, SOLUTION INTRAVENOUS at 05:49

## 2022-11-11 RX ADMIN — SODIUM CHLORIDE SCH UNIT: 9 INJECTION, SOLUTION INTRAVENOUS at 20:07

## 2022-11-11 RX ADMIN — CARVEDILOL SCH MG: 3.12 TABLET, FILM COATED ORAL at 19:32

## 2022-11-11 RX ADMIN — SODIUM CHLORIDE SCH UNIT: 9 INJECTION, SOLUTION INTRAVENOUS at 17:02

## 2022-11-11 RX ADMIN — VANCOMYCIN HYDROCHLORIDE SCH MLS/HR: 1 INJECTION, POWDER, LYOPHILIZED, FOR SOLUTION INTRAVENOUS at 21:15

## 2022-11-11 RX ADMIN — FAMOTIDINE SCH MG: 20 TABLET, FILM COATED ORAL at 09:18

## 2022-11-11 RX ADMIN — CHLORTHALIDONE SCH EACH: 50 TABLET ORAL at 08:52

## 2022-11-11 RX ADMIN — SERTRALINE HYDROCHLORIDE SCH MG: 50 TABLET, FILM COATED ORAL at 19:32

## 2022-11-11 RX ADMIN — Medication SCH GM: at 23:00

## 2022-11-11 RX ADMIN — FAMOTIDINE SCH MG: 20 TABLET, FILM COATED ORAL at 19:32

## 2022-11-11 RX ADMIN — Medication SCH GM: at 05:34

## 2022-11-11 RX ADMIN — MUPIROCIN SCH APPL: 20 OINTMENT TOPICAL at 05:35

## 2022-11-11 RX ADMIN — GABAPENTIN SCH MG: 300 CAPSULE ORAL at 19:32

## 2022-11-11 RX ADMIN — CHLORTHALIDONE SCH EACH: 50 TABLET ORAL at 19:33

## 2022-11-11 RX ADMIN — CARVEDILOL SCH MG: 3.12 TABLET, FILM COATED ORAL at 09:18

## 2022-11-11 RX ADMIN — ATORVASTATIN CALCIUM SCH MG: 40 TABLET, FILM COATED ORAL at 19:32

## 2022-11-11 RX ADMIN — Medication SCH GM: at 16:59

## 2022-11-11 RX ADMIN — POTASSIUM CHLORIDE PRN MLS/HR: 14.9 INJECTION, SOLUTION INTRAVENOUS at 05:35

## 2022-11-11 RX ADMIN — CHLORTHALIDONE SCH EACH: 50 TABLET ORAL at 08:53

## 2022-11-11 RX ADMIN — POTASSIUM CHLORIDE PRN MLS/HR: 14.9 INJECTION, SOLUTION INTRAVENOUS at 11:35

## 2022-11-11 RX ADMIN — ENOXAPARIN SODIUM SCH MG: 30 INJECTION SUBCUTANEOUS at 08:53

## 2022-11-11 RX ADMIN — GABAPENTIN SCH MG: 300 CAPSULE ORAL at 09:19

## 2022-11-12 VITALS — SYSTOLIC BLOOD PRESSURE: 143 MMHG | DIASTOLIC BLOOD PRESSURE: 80 MMHG

## 2022-11-12 VITALS — DIASTOLIC BLOOD PRESSURE: 89 MMHG | SYSTOLIC BLOOD PRESSURE: 140 MMHG

## 2022-11-12 VITALS — SYSTOLIC BLOOD PRESSURE: 93 MMHG | DIASTOLIC BLOOD PRESSURE: 56 MMHG

## 2022-11-12 VITALS — SYSTOLIC BLOOD PRESSURE: 122 MMHG | DIASTOLIC BLOOD PRESSURE: 65 MMHG

## 2022-11-12 VITALS — SYSTOLIC BLOOD PRESSURE: 109 MMHG | DIASTOLIC BLOOD PRESSURE: 62 MMHG

## 2022-11-12 VITALS — DIASTOLIC BLOOD PRESSURE: 93 MMHG | SYSTOLIC BLOOD PRESSURE: 142 MMHG

## 2022-11-12 RX ADMIN — Medication SCH GM: at 23:26

## 2022-11-12 RX ADMIN — SODIUM CHLORIDE SCH UNIT: 9 INJECTION, SOLUTION INTRAVENOUS at 05:18

## 2022-11-12 RX ADMIN — FAMOTIDINE SCH MG: 20 TABLET, FILM COATED ORAL at 08:43

## 2022-11-12 RX ADMIN — GABAPENTIN SCH MG: 300 CAPSULE ORAL at 20:24

## 2022-11-12 RX ADMIN — SODIUM CHLORIDE SCH UNIT: 9 INJECTION, SOLUTION INTRAVENOUS at 16:22

## 2022-11-12 RX ADMIN — CARVEDILOL SCH MG: 3.12 TABLET, FILM COATED ORAL at 08:43

## 2022-11-12 RX ADMIN — FAMOTIDINE SCH MG: 20 TABLET, FILM COATED ORAL at 20:24

## 2022-11-12 RX ADMIN — SODIUM CHLORIDE SCH UNIT: 9 INJECTION, SOLUTION INTRAVENOUS at 11:30

## 2022-11-12 RX ADMIN — MUPIROCIN SCH APPL: 20 OINTMENT TOPICAL at 05:18

## 2022-11-12 RX ADMIN — CHLORTHALIDONE SCH EACH: 50 TABLET ORAL at 08:43

## 2022-11-12 RX ADMIN — CHLORTHALIDONE SCH EACH: 50 TABLET ORAL at 21:00

## 2022-11-12 RX ADMIN — GABAPENTIN SCH MG: 300 CAPSULE ORAL at 08:43

## 2022-11-12 RX ADMIN — ENOXAPARIN SODIUM SCH MG: 30 INJECTION SUBCUTANEOUS at 08:51

## 2022-11-12 RX ADMIN — SERTRALINE HYDROCHLORIDE SCH MG: 50 TABLET, FILM COATED ORAL at 20:24

## 2022-11-12 RX ADMIN — ATORVASTATIN CALCIUM SCH MG: 40 TABLET, FILM COATED ORAL at 20:24

## 2022-11-12 RX ADMIN — VANCOMYCIN HYDROCHLORIDE SCH MLS/HR: 1 INJECTION, POWDER, LYOPHILIZED, FOR SOLUTION INTRAVENOUS at 20:33

## 2022-11-12 RX ADMIN — VANCOMYCIN HYDROCHLORIDE SCH MLS/HR: 1 INJECTION, POWDER, LYOPHILIZED, FOR SOLUTION INTRAVENOUS at 08:43

## 2022-11-12 RX ADMIN — CARVEDILOL SCH MG: 3.12 TABLET, FILM COATED ORAL at 20:25

## 2022-11-12 RX ADMIN — Medication SCH GM: at 15:34

## 2022-11-12 RX ADMIN — SODIUM CHLORIDE SCH UNIT: 9 INJECTION, SOLUTION INTRAVENOUS at 20:42

## 2022-11-12 RX ADMIN — Medication SCH GM: at 05:18

## 2022-11-13 VITALS — DIASTOLIC BLOOD PRESSURE: 71 MMHG | SYSTOLIC BLOOD PRESSURE: 125 MMHG

## 2022-11-13 VITALS — SYSTOLIC BLOOD PRESSURE: 107 MMHG | DIASTOLIC BLOOD PRESSURE: 63 MMHG

## 2022-11-13 VITALS — SYSTOLIC BLOOD PRESSURE: 119 MMHG | DIASTOLIC BLOOD PRESSURE: 70 MMHG

## 2022-11-13 VITALS — DIASTOLIC BLOOD PRESSURE: 59 MMHG | SYSTOLIC BLOOD PRESSURE: 85 MMHG

## 2022-11-13 VITALS — DIASTOLIC BLOOD PRESSURE: 84 MMHG | SYSTOLIC BLOOD PRESSURE: 132 MMHG

## 2022-11-13 VITALS — SYSTOLIC BLOOD PRESSURE: 137 MMHG | DIASTOLIC BLOOD PRESSURE: 77 MMHG

## 2022-11-13 LAB
BUN SERPL-MCNC: 14 MG/DL (ref 7–18)
CHLORIDE SERPL-SCNC: 103 MMOL/L (ref 101–111)
CO2 SERPL-SCNC: 29 MMOL/L (ref 21–32)
CREAT SERPL-MCNC: 0.8 MG/DL (ref 0.5–1.5)
EOSINOPHIL NFR BLD MANUAL: 1 % (ref 1–6)
ERYTHROCYTE [DISTWIDTH] IN BLOOD BY AUTOMATED COUNT: 14.4 % (ref 11–15.5)
GFR SERPL CREATININE-BSD FRML MDRD: 104 ML/MIN (ref 60–?)
GLUCOSE SERPL-MCNC: 152 MG/DL (ref 70–105)
HCT VFR BLD AUTO: 27.7 % (ref 42–54)
LYMPHOCYTES NFR BLD MANUAL: 73 % (ref 22–44)
MAGNESIUM SERPL-MCNC: 1.9 MG/DL (ref 1.8–2.4)
MANUAL DIF COMMENT BLD-IMP: (no result)
MCH RBC QN AUTO: 33 PG (ref 27–33)
MCHC RBC AUTO-ENTMCNC: 34.3 G/DL (ref 32–36)
MCV RBC AUTO: 96.2 FL (ref 79–99)
MONOCYTES NFR BLD MANUAL: 16 % (ref 2–9)
NEUTS BAND NFR BLD MANUAL: 1 % (ref 0–2)
NEUTS SEG NFR BLD MANUAL: 9 % (ref 40–70)
NRBC BLD MANUAL-RTO: 0 % (ref 0–0.19)
PHOSPHATE SERPL-MCNC: 3.2 MG/DL (ref 2.5–4.9)
PLAT MORPH BLD: (no result)
PLATELET # BLD AUTO: 85 K/UL (ref 130–400)
POTASSIUM SERPL-SCNC: 3.1 MMOL/L (ref 3.5–5.1)
RBC # BLD AUTO: 2.88 MIL/UL (ref 4.5–6.2)
RBC MORPH BLD: (no result)
SODIUM SERPL-SCNC: 138 MMOL/L (ref 136–145)
WBC # BLD AUTO: 2.4 K/UL (ref 4.8–10.8)

## 2022-11-13 RX ADMIN — SODIUM CHLORIDE SCH UNIT: 9 INJECTION, SOLUTION INTRAVENOUS at 12:02

## 2022-11-13 RX ADMIN — VANCOMYCIN HYDROCHLORIDE SCH MLS/HR: 1 INJECTION, POWDER, LYOPHILIZED, FOR SOLUTION INTRAVENOUS at 21:02

## 2022-11-13 RX ADMIN — SODIUM CHLORIDE SCH UNIT: 9 INJECTION, SOLUTION INTRAVENOUS at 21:37

## 2022-11-13 RX ADMIN — POTASSIUM CHLORIDE PRN MEQ: 1.5 SOLUTION ORAL at 18:10

## 2022-11-13 RX ADMIN — CARVEDILOL SCH MG: 3.12 TABLET, FILM COATED ORAL at 21:01

## 2022-11-13 RX ADMIN — MUPIROCIN SCH APPL: 20 OINTMENT TOPICAL at 07:30

## 2022-11-13 RX ADMIN — Medication SCH GM: at 15:20

## 2022-11-13 RX ADMIN — GABAPENTIN SCH MG: 300 CAPSULE ORAL at 21:01

## 2022-11-13 RX ADMIN — ATORVASTATIN CALCIUM SCH MG: 40 TABLET, FILM COATED ORAL at 21:01

## 2022-11-13 RX ADMIN — SODIUM CHLORIDE SCH UNIT: 9 INJECTION, SOLUTION INTRAVENOUS at 11:30

## 2022-11-13 RX ADMIN — GABAPENTIN SCH MG: 300 CAPSULE ORAL at 09:46

## 2022-11-13 RX ADMIN — FAMOTIDINE SCH MG: 20 TABLET, FILM COATED ORAL at 21:01

## 2022-11-13 RX ADMIN — SODIUM CHLORIDE SCH UNIT: 9 INJECTION, SOLUTION INTRAVENOUS at 05:45

## 2022-11-13 RX ADMIN — ENOXAPARIN SODIUM SCH MG: 30 INJECTION SUBCUTANEOUS at 09:00

## 2022-11-13 RX ADMIN — SERTRALINE HYDROCHLORIDE SCH MG: 50 TABLET, FILM COATED ORAL at 21:01

## 2022-11-13 RX ADMIN — VANCOMYCIN HYDROCHLORIDE SCH MLS/HR: 1 INJECTION, POWDER, LYOPHILIZED, FOR SOLUTION INTRAVENOUS at 09:45

## 2022-11-13 RX ADMIN — FAMOTIDINE SCH MG: 20 TABLET, FILM COATED ORAL at 09:45

## 2022-11-13 RX ADMIN — CHLORTHALIDONE SCH EACH: 50 TABLET ORAL at 09:00

## 2022-11-13 RX ADMIN — CHLORTHALIDONE SCH EACH: 50 TABLET ORAL at 21:00

## 2022-11-13 RX ADMIN — Medication SCH GM: at 05:45

## 2022-11-13 RX ADMIN — SODIUM CHLORIDE SCH UNIT: 9 INJECTION, SOLUTION INTRAVENOUS at 16:54

## 2022-11-13 RX ADMIN — CARVEDILOL SCH MG: 3.12 TABLET, FILM COATED ORAL at 09:47

## 2022-11-13 RX ADMIN — POTASSIUM CHLORIDE PRN MEQ: 1.5 SOLUTION ORAL at 18:11

## 2022-11-14 VITALS — SYSTOLIC BLOOD PRESSURE: 120 MMHG | DIASTOLIC BLOOD PRESSURE: 66 MMHG

## 2022-11-14 VITALS — DIASTOLIC BLOOD PRESSURE: 78 MMHG | SYSTOLIC BLOOD PRESSURE: 127 MMHG

## 2022-11-14 VITALS — DIASTOLIC BLOOD PRESSURE: 63 MMHG | SYSTOLIC BLOOD PRESSURE: 115 MMHG

## 2022-11-14 VITALS — DIASTOLIC BLOOD PRESSURE: 88 MMHG | SYSTOLIC BLOOD PRESSURE: 148 MMHG

## 2022-11-14 VITALS — DIASTOLIC BLOOD PRESSURE: 60 MMHG | SYSTOLIC BLOOD PRESSURE: 128 MMHG

## 2022-11-14 RX ADMIN — CARVEDILOL SCH MG: 3.12 TABLET, FILM COATED ORAL at 22:23

## 2022-11-14 RX ADMIN — SERTRALINE HYDROCHLORIDE SCH MG: 50 TABLET, FILM COATED ORAL at 22:22

## 2022-11-14 RX ADMIN — GABAPENTIN SCH MG: 300 CAPSULE ORAL at 22:22

## 2022-11-14 RX ADMIN — CARVEDILOL SCH MG: 3.12 TABLET, FILM COATED ORAL at 09:11

## 2022-11-14 RX ADMIN — MUPIROCIN SCH APPL: 20 OINTMENT TOPICAL at 19:49

## 2022-11-14 RX ADMIN — FAMOTIDINE SCH MG: 20 TABLET, FILM COATED ORAL at 09:09

## 2022-11-14 RX ADMIN — CHLORTHALIDONE SCH EACH: 50 TABLET ORAL at 21:00

## 2022-11-14 RX ADMIN — SODIUM CHLORIDE SCH UNIT: 9 INJECTION, SOLUTION INTRAVENOUS at 16:37

## 2022-11-14 RX ADMIN — Medication SCH GM: at 09:21

## 2022-11-14 RX ADMIN — GABAPENTIN SCH MG: 300 CAPSULE ORAL at 09:10

## 2022-11-14 RX ADMIN — FAMOTIDINE SCH MG: 20 TABLET, FILM COATED ORAL at 22:22

## 2022-11-14 RX ADMIN — SODIUM CHLORIDE SCH UNIT: 9 INJECTION, SOLUTION INTRAVENOUS at 12:16

## 2022-11-14 RX ADMIN — ATORVASTATIN CALCIUM SCH MG: 40 TABLET, FILM COATED ORAL at 22:22

## 2022-11-14 RX ADMIN — CHLORTHALIDONE SCH EACH: 50 TABLET ORAL at 09:24

## 2022-11-14 RX ADMIN — SODIUM CHLORIDE SCH UNIT: 9 INJECTION, SOLUTION INTRAVENOUS at 06:53

## 2022-11-14 RX ADMIN — ENOXAPARIN SODIUM SCH MG: 30 INJECTION SUBCUTANEOUS at 09:00

## 2022-11-14 RX ADMIN — SODIUM CHLORIDE SCH UNIT: 9 INJECTION, SOLUTION INTRAVENOUS at 21:00

## 2022-11-14 RX ADMIN — Medication SCH GM: at 00:49

## 2022-11-14 RX ADMIN — CHLORTHALIDONE SCH EACH: 50 TABLET ORAL at 09:00

## 2022-11-15 VITALS — SYSTOLIC BLOOD PRESSURE: 108 MMHG | DIASTOLIC BLOOD PRESSURE: 66 MMHG

## 2022-11-15 VITALS — SYSTOLIC BLOOD PRESSURE: 101 MMHG | DIASTOLIC BLOOD PRESSURE: 64 MMHG

## 2022-11-15 VITALS — DIASTOLIC BLOOD PRESSURE: 79 MMHG | SYSTOLIC BLOOD PRESSURE: 130 MMHG

## 2022-11-15 VITALS — DIASTOLIC BLOOD PRESSURE: 79 MMHG | SYSTOLIC BLOOD PRESSURE: 118 MMHG

## 2022-11-15 VITALS — SYSTOLIC BLOOD PRESSURE: 135 MMHG | DIASTOLIC BLOOD PRESSURE: 81 MMHG

## 2022-11-15 VITALS — DIASTOLIC BLOOD PRESSURE: 75 MMHG | SYSTOLIC BLOOD PRESSURE: 136 MMHG

## 2022-11-15 RX ADMIN — CHLORTHALIDONE SCH EACH: 50 TABLET ORAL at 21:43

## 2022-11-15 RX ADMIN — GABAPENTIN SCH MG: 300 CAPSULE ORAL at 09:39

## 2022-11-15 RX ADMIN — SODIUM CHLORIDE SCH UNIT: 9 INJECTION, SOLUTION INTRAVENOUS at 16:10

## 2022-11-15 RX ADMIN — Medication SCH GM: at 21:29

## 2022-11-15 RX ADMIN — ATORVASTATIN CALCIUM SCH MG: 40 TABLET, FILM COATED ORAL at 21:29

## 2022-11-15 RX ADMIN — CHLORTHALIDONE SCH EACH: 50 TABLET ORAL at 09:00

## 2022-11-15 RX ADMIN — VANCOMYCIN HYDROCHLORIDE SCH MLS/HR: 1 INJECTION, POWDER, LYOPHILIZED, FOR SOLUTION INTRAVENOUS at 21:29

## 2022-11-15 RX ADMIN — GABAPENTIN SCH MG: 300 CAPSULE ORAL at 21:29

## 2022-11-15 RX ADMIN — VANCOMYCIN HYDROCHLORIDE SCH MLS/HR: 1 INJECTION, POWDER, LYOPHILIZED, FOR SOLUTION INTRAVENOUS at 13:34

## 2022-11-15 RX ADMIN — SERTRALINE HYDROCHLORIDE SCH MG: 50 TABLET, FILM COATED ORAL at 21:29

## 2022-11-15 RX ADMIN — ENOXAPARIN SODIUM SCH MG: 30 INJECTION SUBCUTANEOUS at 09:00

## 2022-11-15 RX ADMIN — SODIUM CHLORIDE SCH UNIT: 9 INJECTION, SOLUTION INTRAVENOUS at 06:28

## 2022-11-15 RX ADMIN — FAMOTIDINE SCH MG: 20 TABLET, FILM COATED ORAL at 21:30

## 2022-11-15 RX ADMIN — CARVEDILOL SCH MG: 3.12 TABLET, FILM COATED ORAL at 21:30

## 2022-11-15 RX ADMIN — SODIUM CHLORIDE SCH UNIT: 9 INJECTION, SOLUTION INTRAVENOUS at 13:08

## 2022-11-15 RX ADMIN — Medication SCH GM: at 13:06

## 2022-11-15 RX ADMIN — FAMOTIDINE SCH MG: 20 TABLET, FILM COATED ORAL at 09:40

## 2022-11-15 RX ADMIN — CARVEDILOL SCH MG: 3.12 TABLET, FILM COATED ORAL at 09:40

## 2022-11-15 RX ADMIN — SODIUM CHLORIDE SCH UNIT: 9 INJECTION, SOLUTION INTRAVENOUS at 21:43

## 2022-11-16 VITALS — DIASTOLIC BLOOD PRESSURE: 68 MMHG | SYSTOLIC BLOOD PRESSURE: 107 MMHG

## 2022-11-16 VITALS — SYSTOLIC BLOOD PRESSURE: 145 MMHG | DIASTOLIC BLOOD PRESSURE: 86 MMHG

## 2022-11-16 VITALS — DIASTOLIC BLOOD PRESSURE: 72 MMHG | SYSTOLIC BLOOD PRESSURE: 103 MMHG

## 2022-11-16 VITALS — SYSTOLIC BLOOD PRESSURE: 125 MMHG | DIASTOLIC BLOOD PRESSURE: 66 MMHG

## 2022-11-16 VITALS — SYSTOLIC BLOOD PRESSURE: 118 MMHG | DIASTOLIC BLOOD PRESSURE: 72 MMHG

## 2022-11-16 VITALS — SYSTOLIC BLOOD PRESSURE: 155 MMHG | DIASTOLIC BLOOD PRESSURE: 83 MMHG

## 2022-11-16 VITALS — DIASTOLIC BLOOD PRESSURE: 87 MMHG | SYSTOLIC BLOOD PRESSURE: 142 MMHG

## 2022-11-16 LAB
BUN SERPL-MCNC: 16 MG/DL (ref 7–18)
CHLORIDE SERPL-SCNC: 102 MMOL/L (ref 101–111)
CO2 SERPL-SCNC: 28 MMOL/L (ref 21–32)
CREAT SERPL-MCNC: 0.9 MG/DL (ref 0.5–1.5)
ERYTHROCYTE [DISTWIDTH] IN BLOOD BY AUTOMATED COUNT: 14.6 % (ref 11–15.5)
GFR SERPL CREATININE-BSD FRML MDRD: 91 ML/MIN (ref 60–?)
GLUCOSE SERPL-MCNC: 226 MG/DL (ref 70–105)
HCT VFR BLD AUTO: 27.4 % (ref 42–54)
MAGNESIUM SERPL-MCNC: 1.8 MG/DL (ref 1.8–2.4)
MCH RBC QN AUTO: 32.9 PG (ref 27–33)
MCHC RBC AUTO-ENTMCNC: 33.9 G/DL (ref 32–36)
MCV RBC AUTO: 96.8 FL (ref 79–99)
NRBC BLD MANUAL-RTO: 0 % (ref 0–0.19)
PHOSPHATE SERPL-MCNC: 3.6 MG/DL (ref 2.5–4.9)
PLATELET # BLD AUTO: 83 K/UL (ref 130–400)
POTASSIUM SERPL-SCNC: 3.5 MMOL/L (ref 3.5–5.1)
RBC # BLD AUTO: 2.83 MIL/UL (ref 4.5–6.2)
SODIUM SERPL-SCNC: 136 MMOL/L (ref 136–145)
WBC # BLD AUTO: 1.7 K/UL (ref 4.8–10.8)

## 2022-11-16 RX ADMIN — FAMOTIDINE SCH MG: 20 TABLET, FILM COATED ORAL at 10:14

## 2022-11-16 RX ADMIN — CARVEDILOL SCH MG: 3.12 TABLET, FILM COATED ORAL at 10:15

## 2022-11-16 RX ADMIN — FAMOTIDINE SCH MG: 20 TABLET, FILM COATED ORAL at 21:32

## 2022-11-16 RX ADMIN — SODIUM CHLORIDE SCH UNIT: 9 INJECTION, SOLUTION INTRAVENOUS at 13:24

## 2022-11-16 RX ADMIN — Medication SCH GM: at 04:40

## 2022-11-16 RX ADMIN — CARVEDILOL SCH MG: 3.12 TABLET, FILM COATED ORAL at 21:33

## 2022-11-16 RX ADMIN — GABAPENTIN SCH MG: 300 CAPSULE ORAL at 21:31

## 2022-11-16 RX ADMIN — GABAPENTIN SCH MG: 300 CAPSULE ORAL at 10:14

## 2022-11-16 RX ADMIN — Medication SCH GM: at 13:28

## 2022-11-16 RX ADMIN — CHLORTHALIDONE SCH EACH: 50 TABLET ORAL at 21:33

## 2022-11-16 RX ADMIN — ENOXAPARIN SODIUM SCH MG: 30 INJECTION SUBCUTANEOUS at 09:00

## 2022-11-16 RX ADMIN — SERTRALINE HYDROCHLORIDE SCH MG: 50 TABLET, FILM COATED ORAL at 21:32

## 2022-11-16 RX ADMIN — ATORVASTATIN CALCIUM SCH MG: 40 TABLET, FILM COATED ORAL at 21:33

## 2022-11-16 RX ADMIN — VANCOMYCIN HYDROCHLORIDE SCH MLS/HR: 1 INJECTION, POWDER, LYOPHILIZED, FOR SOLUTION INTRAVENOUS at 10:14

## 2022-11-16 RX ADMIN — VANCOMYCIN HYDROCHLORIDE SCH MLS/HR: 1 INJECTION, POWDER, LYOPHILIZED, FOR SOLUTION INTRAVENOUS at 21:37

## 2022-11-16 RX ADMIN — SODIUM CHLORIDE SCH UNIT: 9 INJECTION, SOLUTION INTRAVENOUS at 06:25

## 2022-11-16 RX ADMIN — MUPIROCIN SCH APPL: 20 OINTMENT TOPICAL at 06:38

## 2022-11-16 RX ADMIN — CHLORTHALIDONE SCH EACH: 50 TABLET ORAL at 09:00

## 2022-11-16 RX ADMIN — Medication SCH GM: at 21:33

## 2022-11-16 RX ADMIN — SODIUM CHLORIDE SCH UNIT: 9 INJECTION, SOLUTION INTRAVENOUS at 21:32

## 2022-11-17 VITALS — SYSTOLIC BLOOD PRESSURE: 142 MMHG | DIASTOLIC BLOOD PRESSURE: 85 MMHG

## 2022-11-17 VITALS — SYSTOLIC BLOOD PRESSURE: 150 MMHG | DIASTOLIC BLOOD PRESSURE: 95 MMHG

## 2022-11-17 VITALS — DIASTOLIC BLOOD PRESSURE: 75 MMHG | SYSTOLIC BLOOD PRESSURE: 127 MMHG

## 2022-11-17 RX ADMIN — CHLORTHALIDONE SCH EACH: 50 TABLET ORAL at 07:55

## 2022-11-17 RX ADMIN — FAMOTIDINE SCH MG: 20 TABLET, FILM COATED ORAL at 09:25

## 2022-11-17 RX ADMIN — CHLORTHALIDONE SCH EACH: 50 TABLET ORAL at 09:00

## 2022-11-17 RX ADMIN — CARVEDILOL SCH MG: 3.12 TABLET, FILM COATED ORAL at 09:25

## 2022-11-17 RX ADMIN — ENOXAPARIN SODIUM SCH MG: 30 INJECTION SUBCUTANEOUS at 09:24

## 2022-11-17 RX ADMIN — GABAPENTIN SCH MG: 300 CAPSULE ORAL at 09:24

## 2022-11-17 RX ADMIN — SODIUM CHLORIDE SCH UNIT: 9 INJECTION, SOLUTION INTRAVENOUS at 06:59

## 2022-11-17 RX ADMIN — Medication SCH GM: at 05:00

## 2022-11-17 RX ADMIN — SODIUM CHLORIDE SCH UNIT: 9 INJECTION, SOLUTION INTRAVENOUS at 11:44

## 2022-11-17 RX ADMIN — MUPIROCIN SCH APPL: 20 OINTMENT TOPICAL at 07:47

## 2022-12-05 ENCOUNTER — HOSPITAL ENCOUNTER (OUTPATIENT)
Dept: HOSPITAL 90 - LAB | Age: 62
Discharge: HOME | End: 2022-12-05
Attending: INTERNAL MEDICINE
Payer: COMMERCIAL

## 2022-12-05 DIAGNOSIS — C06.9: Primary | ICD-10-CM

## 2022-12-05 DIAGNOSIS — N18.2: ICD-10-CM

## 2022-12-05 DIAGNOSIS — E78.2: ICD-10-CM

## 2022-12-05 LAB
ALBUMIN SERPL-MCNC: 3.5 G/DL (ref 3.5–5)
ALT SERPL-CCNC: 14 U/L (ref 12–78)
AST SERPL-CCNC: 14 U/L (ref 10–37)
BASOPHILS NFR BLD AUTO: 0.7 % (ref 0–5)
BUN SERPL-MCNC: 21 MG/DL (ref 7–18)
CHLORIDE SERPL-SCNC: 95 MMOL/L (ref 101–111)
CO2 SERPL-SCNC: 26 MMOL/L (ref 21–32)
CREAT SERPL-MCNC: 1.2 MG/DL (ref 0.5–1.5)
EOSINOPHIL NFR BLD AUTO: 2.1 % (ref 0–8)
EOSINOPHIL NFR BLD MANUAL: 1 % (ref 1–6)
ERYTHROCYTE [DISTWIDTH] IN BLOOD BY AUTOMATED COUNT: 14.1 % (ref 11–15.5)
GFR SERPL CREATININE-BSD FRML MDRD: 65 ML/MIN (ref 60–?)
GLUCOSE SERPL-MCNC: 442 MG/DL (ref 70–105)
HCT VFR BLD AUTO: 34.9 % (ref 42–54)
LYMPHOCYTES NFR BLD MANUAL: 32 % (ref 22–44)
LYMPHOCYTES NFR SPEC AUTO: 32.7 % (ref 21–51)
MANUAL DIF COMMENT BLD-IMP: (no result)
MCH RBC QN AUTO: 31.1 PG (ref 27–33)
MCHC RBC AUTO-ENTMCNC: 32.4 G/DL (ref 32–36)
MCV RBC AUTO: 96.1 FL (ref 79–99)
MONOCYTES NFR BLD AUTO: 15.8 % (ref 3–13)
MONOCYTES NFR BLD MANUAL: 9 % (ref 2–9)
NEUTROPHILS NFR BLD AUTO: 48.3 % (ref 40–77)
NEUTS BAND NFR BLD MANUAL: 6 % (ref 0–2)
NEUTS SEG NFR BLD MANUAL: 47 % (ref 40–70)
NRBC BLD MANUAL-RTO: 0 % (ref 0–0.19)
PLATELET # BLD AUTO: 115 K/UL (ref 130–400)
POTASSIUM SERPL-SCNC: 4.4 MMOL/L (ref 3.5–5.1)
PROT SERPL-MCNC: 8.7 G/DL (ref 6–8.3)
RBC # BLD AUTO: 3.63 MIL/UL (ref 4.5–6.2)
RBC MORPH BLD: (no result)
SODIUM SERPL-SCNC: 131 MMOL/L (ref 136–145)
VARIANT LYMPHS NFR BLD MANUAL: 5 % (ref 0–0)
WBC # BLD AUTO: 2.8 K/UL (ref 4.8–10.8)

## 2022-12-05 PROCEDURE — 85025 COMPLETE CBC W/AUTO DIFF WBC: CPT

## 2022-12-05 PROCEDURE — 80053 COMPREHEN METABOLIC PANEL: CPT

## 2022-12-05 PROCEDURE — 80202 ASSAY OF VANCOMYCIN: CPT

## 2022-12-05 PROCEDURE — 36415 COLL VENOUS BLD VENIPUNCTURE: CPT

## 2022-12-12 ENCOUNTER — HOSPITAL ENCOUNTER (OUTPATIENT)
Dept: HOSPITAL 90 - LAB | Age: 62
Discharge: HOME | End: 2022-12-12
Attending: INTERNAL MEDICINE
Payer: COMMERCIAL

## 2022-12-12 DIAGNOSIS — E78.2: ICD-10-CM

## 2022-12-12 DIAGNOSIS — N18.2: Primary | ICD-10-CM

## 2022-12-12 DIAGNOSIS — C06.9: ICD-10-CM

## 2022-12-12 LAB
ALBUMIN SERPL-MCNC: 3.7 G/DL (ref 3.5–5)
ALT SERPL-CCNC: 18 U/L (ref 12–78)
AST SERPL-CCNC: 20 U/L (ref 10–37)
BASOPHILS NFR BLD AUTO: 0.7 % (ref 0–5)
BUN SERPL-MCNC: 17 MG/DL (ref 7–18)
CHLORIDE SERPL-SCNC: 97 MMOL/L (ref 101–111)
CO2 SERPL-SCNC: 26 MMOL/L (ref 21–32)
CREAT SERPL-MCNC: 1.1 MG/DL (ref 0.5–1.5)
EOSINOPHIL NFR BLD AUTO: 1.6 % (ref 0–8)
ERYTHROCYTE [DISTWIDTH] IN BLOOD BY AUTOMATED COUNT: 14 % (ref 11–15.5)
GFR SERPL CREATININE-BSD FRML MDRD: 72 ML/MIN (ref 60–?)
GLUCOSE SERPL-MCNC: 352 MG/DL (ref 70–105)
HCT VFR BLD AUTO: 41.4 % (ref 42–54)
LYMPHOCYTES NFR SPEC AUTO: 24.9 % (ref 21–51)
MCH RBC QN AUTO: 31.1 PG (ref 27–33)
MCHC RBC AUTO-ENTMCNC: 31.9 G/DL (ref 32–36)
MCV RBC AUTO: 97.6 FL (ref 79–99)
MONOCYTES NFR BLD AUTO: 10.7 % (ref 3–13)
NEUTROPHILS NFR BLD AUTO: 61.9 % (ref 40–77)
NRBC BLD MANUAL-RTO: 0 % (ref 0–0.19)
PLATELET # BLD AUTO: 73 K/UL (ref 130–400)
POTASSIUM SERPL-SCNC: 4.7 MMOL/L (ref 3.5–5.1)
PROT SERPL-MCNC: 8.9 G/DL (ref 6–8.3)
RBC # BLD AUTO: 4.24 MIL/UL (ref 4.5–6.2)
SODIUM SERPL-SCNC: 132 MMOL/L (ref 136–145)
WBC # BLD AUTO: 4.3 K/UL (ref 4.8–10.8)

## 2022-12-12 PROCEDURE — 80202 ASSAY OF VANCOMYCIN: CPT

## 2022-12-12 PROCEDURE — 85025 COMPLETE CBC W/AUTO DIFF WBC: CPT

## 2022-12-12 PROCEDURE — 80053 COMPREHEN METABOLIC PANEL: CPT

## 2022-12-12 PROCEDURE — 36415 COLL VENOUS BLD VENIPUNCTURE: CPT

## 2022-12-19 ENCOUNTER — HOSPITAL ENCOUNTER (OUTPATIENT)
Dept: HOSPITAL 90 - LAB | Age: 62
Discharge: HOME | End: 2022-12-19
Attending: INTERNAL MEDICINE
Payer: COMMERCIAL

## 2022-12-19 DIAGNOSIS — N18.2: ICD-10-CM

## 2022-12-19 DIAGNOSIS — C06.9: Primary | ICD-10-CM

## 2022-12-19 DIAGNOSIS — E78.2: ICD-10-CM

## 2022-12-19 LAB
ALBUMIN SERPL-MCNC: 3.5 G/DL (ref 3.5–5)
ALT SERPL-CCNC: 17 U/L (ref 12–78)
AST SERPL-CCNC: 12 U/L (ref 10–37)
BASOPHILS NFR BLD AUTO: 0.4 % (ref 0–5)
BUN SERPL-MCNC: 23 MG/DL (ref 7–18)
CHLORIDE SERPL-SCNC: 90 MMOL/L (ref 101–111)
CO2 SERPL-SCNC: 29 MMOL/L (ref 21–32)
CREAT SERPL-MCNC: 1.2 MG/DL (ref 0.5–1.5)
EOSINOPHIL NFR BLD AUTO: 0.6 % (ref 0–8)
ERYTHROCYTE [DISTWIDTH] IN BLOOD BY AUTOMATED COUNT: 13.9 % (ref 11–15.5)
GFR SERPL CREATININE-BSD FRML MDRD: 65 ML/MIN (ref 60–?)
GLUCOSE SERPL-MCNC: 667 MG/DL (ref 70–105)
HCT VFR BLD AUTO: 36.8 % (ref 42–54)
LYMPHOCYTES NFR SPEC AUTO: 19.2 % (ref 21–51)
MCH RBC QN AUTO: 30.5 PG (ref 27–33)
MCHC RBC AUTO-ENTMCNC: 32.3 G/DL (ref 32–36)
MCV RBC AUTO: 94.4 FL (ref 79–99)
MONOCYTES NFR BLD AUTO: 12.2 % (ref 3–13)
NEUTROPHILS NFR BLD AUTO: 67.2 % (ref 40–77)
NRBC BLD MANUAL-RTO: 0 % (ref 0–0.19)
PLATELET # BLD AUTO: 99 K/UL (ref 130–400)
POTASSIUM SERPL-SCNC: 4.9 MMOL/L (ref 3.5–5.1)
PROT SERPL-MCNC: 8.5 G/DL (ref 6–8.3)
RBC # BLD AUTO: 3.9 MIL/UL (ref 4.5–6.2)
SODIUM SERPL-SCNC: 126 MMOL/L (ref 136–145)
WBC # BLD AUTO: 5.3 K/UL (ref 4.8–10.8)

## 2022-12-19 PROCEDURE — 36415 COLL VENOUS BLD VENIPUNCTURE: CPT

## 2022-12-19 PROCEDURE — 80053 COMPREHEN METABOLIC PANEL: CPT

## 2022-12-19 PROCEDURE — 85025 COMPLETE CBC W/AUTO DIFF WBC: CPT

## 2022-12-19 PROCEDURE — 80202 ASSAY OF VANCOMYCIN: CPT

## 2023-11-17 ENCOUNTER — HOSPITAL ENCOUNTER (OUTPATIENT)
Dept: HOSPITAL 90 - LAB | Age: 63
Discharge: HOME | End: 2023-11-17
Attending: INTERNAL MEDICINE
Payer: COMMERCIAL

## 2023-11-17 DIAGNOSIS — I50.42: Primary | ICD-10-CM

## 2023-11-17 DIAGNOSIS — I25.2: ICD-10-CM

## 2023-11-17 DIAGNOSIS — I35.0: ICD-10-CM

## 2023-11-17 LAB
ALBUMIN SERPL-MCNC: 3.8 G/DL (ref 3.5–5)
ALT SERPL-CCNC: 16 U/L (ref 12–78)
AST SERPL-CCNC: 19 U/L (ref 10–37)
BASOPHILS # BLD AUTO: 0.02 K/UL (ref 0–0.2)
BASOPHILS NFR BLD AUTO: 0.3 % (ref 0–5)
BILIRUB SERPL-MCNC: 0.6 MG/DL (ref 0.2–1)
BNP SERPL-MCNC: 100 PG/ML (ref 0–100)
BUN SERPL-MCNC: 16 MG/DL (ref 7–18)
CHLORIDE SERPL-SCNC: 94 MMOL/L (ref 101–111)
CHOLEST SERPL-MCNC: 212 MG/DL (ref ?–200)
CO2 SERPL-SCNC: 28 MMOL/L (ref 21–32)
CREAT SERPL-MCNC: 0.9 MG/DL (ref 0.5–1.5)
EOSINOPHIL # BLD AUTO: 0.04 K/UL (ref 0–0.7)
EOSINOPHIL NFR BLD AUTO: 0.7 % (ref 0–8)
ERYTHROCYTE [DISTWIDTH] IN BLOOD BY AUTOMATED COUNT: 18 % (ref 11–15.5)
GFR SERPL CREATININE-BSD FRML MDRD: 97 ML/MIN (ref 90–?)
GLUCOSE SERPL-MCNC: 413 MG/DL (ref 70–105)
HCT VFR BLD AUTO: 46 % (ref 42–54)
HDLC SERPL-MCNC: 55 MG/DL (ref 29–71)
IMM GRANULOCYTES # BLD: 0.02 K/UL (ref 0–1)
LDLC SERPL CALC-MCNC: 121 MG/DL (ref 0–99)
LYMPHOCYTES # SPEC AUTO: 1.5 K/UL (ref 1–4.8)
LYMPHOCYTES NFR SPEC AUTO: 26.4 % (ref 21–51)
MCH RBC QN AUTO: 28.3 PG (ref 27–33)
MCHC RBC AUTO-ENTMCNC: 32.8 G/DL (ref 32–36)
MCV RBC AUTO: 86.3 FL (ref 79–99)
MONOCYTES # BLD AUTO: 0.5 K/UL (ref 0.1–1)
MONOCYTES NFR BLD AUTO: 8 % (ref 3–13)
NEUTROPHILS # BLD AUTO: 3.8 K/UL (ref 1.8–7.7)
NEUTROPHILS NFR BLD AUTO: 64.3 % (ref 40–77)
NRBC BLD MANUAL-RTO: 0 % (ref 0–0.19)
PLATELET # BLD AUTO: 100 K/UL (ref 130–400)
POTASSIUM SERPL-SCNC: 4.6 MMOL/L (ref 3.5–5.1)
PROT SERPL-MCNC: 7.8 G/DL (ref 6–8.3)
RBC # BLD AUTO: 5.33 MIL/UL (ref 4.5–6.2)
RBC MORPH BLD: (no result)
SODIUM SERPL-SCNC: 129 MMOL/L (ref 136–145)
TRIGL SERPL-MCNC: 306 MG/DL (ref 30–200)
WBC # BLD AUTO: 5.8 K/UL (ref 4.8–10.8)

## 2023-11-17 PROCEDURE — 80061 LIPID PANEL: CPT

## 2023-11-17 PROCEDURE — 36415 COLL VENOUS BLD VENIPUNCTURE: CPT

## 2023-11-17 PROCEDURE — 85025 COMPLETE CBC W/AUTO DIFF WBC: CPT

## 2023-11-17 PROCEDURE — 83880 ASSAY OF NATRIURETIC PEPTIDE: CPT

## 2023-11-17 PROCEDURE — 80053 COMPREHEN METABOLIC PANEL: CPT

## 2024-04-11 ENCOUNTER — HOSPITAL ENCOUNTER (OUTPATIENT)
Dept: HOSPITAL 90 - LAB | Age: 64
Discharge: HOME | End: 2024-04-11
Attending: INTERNAL MEDICINE
Payer: COMMERCIAL

## 2024-04-11 DIAGNOSIS — I50.22: ICD-10-CM

## 2024-04-11 DIAGNOSIS — R07.89: ICD-10-CM

## 2024-04-11 DIAGNOSIS — I35.0: Primary | ICD-10-CM

## 2024-04-11 LAB
ALBUMIN SERPL-MCNC: 3.8 G/DL (ref 3.5–5)
ALT SERPL-CCNC: 25 U/L (ref 12–78)
AST SERPL-CCNC: 22 U/L (ref 10–37)
BASOPHILS # BLD AUTO: 0.04 K/UL (ref 0–0.2)
BASOPHILS NFR BLD AUTO: 0.5 % (ref 0–5)
BILIRUB SERPL-MCNC: 0.6 MG/DL (ref 0.2–1)
BNP SERPL-MCNC: 142 PG/ML (ref 0–100)
BUN SERPL-MCNC: 14 MG/DL (ref 7–18)
CHLORIDE SERPL-SCNC: 99 MMOL/L (ref 101–111)
CHOLEST SERPL-MCNC: 155 MG/DL (ref ?–200)
CO2 SERPL-SCNC: 27 MMOL/L (ref 21–32)
CREAT SERPL-MCNC: 1 MG/DL (ref 0.5–1.3)
EOSINOPHIL # BLD AUTO: 0.13 K/UL (ref 0–0.7)
EOSINOPHIL NFR BLD AUTO: 1.7 % (ref 0–8)
ERYTHROCYTE [DISTWIDTH] IN BLOOD BY AUTOMATED COUNT: 18.7 % (ref 11–15.5)
GFR SERPL CREATININE-BSD FRML MDRD: 85 ML/MIN (ref 90–?)
GLUCOSE SERPL-MCNC: 168 MG/DL (ref 70–105)
HCT VFR BLD AUTO: 46 % (ref 42–54)
HDLC SERPL-MCNC: 73 MG/DL (ref 29–71)
IMM GRANULOCYTES # BLD: 0.03 K/UL (ref 0–1)
LDLC SERPL CALC-MCNC: 74 MG/DL (ref 0–99)
LYMPHOCYTES # SPEC AUTO: 2.1 K/UL (ref 1–4.8)
LYMPHOCYTES NFR SPEC AUTO: 27.5 % (ref 21–51)
MAGNESIUM SERPL-MCNC: 2.2 MG/DL (ref 1.8–2.4)
MCH RBC QN AUTO: 27.9 PG (ref 27–33)
MCHC RBC AUTO-ENTMCNC: 32.6 G/DL (ref 32–36)
MCV RBC AUTO: 85.7 FL (ref 79–99)
MONOCYTES # BLD AUTO: 0.7 K/UL (ref 0.1–1)
MONOCYTES NFR BLD AUTO: 9.7 % (ref 3–13)
NEUTROPHILS # BLD AUTO: 4.6 K/UL (ref 1.8–7.7)
NEUTROPHILS NFR BLD AUTO: 60.2 % (ref 40–77)
NRBC BLD MANUAL-RTO: 0 % (ref 0–0.19)
PLATELET # BLD AUTO: 100 K/UL (ref 130–400)
POTASSIUM SERPL-SCNC: 4.4 MMOL/L (ref 3.5–5.1)
PROT SERPL-MCNC: 8 G/DL (ref 6–8.3)
RBC # BLD AUTO: 5.37 MIL/UL (ref 4.5–6.2)
RBC MORPH BLD: (no result)
SODIUM SERPL-SCNC: 135 MMOL/L (ref 136–145)
TRIGL SERPL-MCNC: 68 MG/DL (ref 30–200)
WBC # BLD AUTO: 7.6 K/UL (ref 4.8–10.8)

## 2024-04-11 PROCEDURE — 36415 COLL VENOUS BLD VENIPUNCTURE: CPT

## 2024-04-11 PROCEDURE — 80053 COMPREHEN METABOLIC PANEL: CPT

## 2024-04-11 PROCEDURE — 83880 ASSAY OF NATRIURETIC PEPTIDE: CPT

## 2024-04-11 PROCEDURE — 83735 ASSAY OF MAGNESIUM: CPT

## 2024-04-11 PROCEDURE — 85025 COMPLETE CBC W/AUTO DIFF WBC: CPT

## 2024-04-11 PROCEDURE — 80061 LIPID PANEL: CPT

## 2024-04-30 ENCOUNTER — HOSPITAL ENCOUNTER (OUTPATIENT)
Dept: HOSPITAL 90 - DAH | Age: 64
Discharge: HOME | End: 2024-04-30
Attending: INTERNAL MEDICINE
Payer: COMMERCIAL

## 2024-04-30 VITALS — RESPIRATION RATE: 16 BRPM | HEART RATE: 94 BPM | SYSTOLIC BLOOD PRESSURE: 168 MMHG | DIASTOLIC BLOOD PRESSURE: 89 MMHG

## 2024-04-30 VITALS — BODY MASS INDEX: 24.46 KG/M2 | HEIGHT: 68 IN | WEIGHT: 161.4 LBS

## 2024-04-30 DIAGNOSIS — I45.10: Primary | ICD-10-CM

## 2024-04-30 DIAGNOSIS — I21.9: ICD-10-CM

## 2024-04-30 DIAGNOSIS — I73.9: ICD-10-CM

## 2024-04-30 DIAGNOSIS — R00.0: ICD-10-CM

## 2024-04-30 LAB
APPEARANCE UR: CLEAR
APTT PPP: 28.8 SEC (ref 26.3–35.5)
BASOPHILS # BLD AUTO: 0.02 K/UL (ref 0–0.2)
BASOPHILS NFR BLD AUTO: 0.3 % (ref 0–5)
BILIRUB UR QL STRIP: NEGATIVE MG/DL
BNP SERPL-MCNC: 101 PG/ML (ref 0–100)
BUN SERPL-MCNC: 12 MG/DL (ref 7–18)
CHLORIDE SERPL-SCNC: 100 MMOL/L (ref 101–111)
CO2 SERPL-SCNC: 33 MMOL/L (ref 21–32)
COLOR UR: (no result)
CREAT SERPL-MCNC: 1 MG/DL (ref 0.5–1.3)
EOSINOPHIL # BLD AUTO: 0.09 K/UL (ref 0–0.7)
EOSINOPHIL NFR BLD AUTO: 1.4 % (ref 0–8)
ERYTHROCYTE [DISTWIDTH] IN BLOOD BY AUTOMATED COUNT: 19.2 % (ref 11–15.5)
GFR SERPL CREATININE-BSD FRML MDRD: 85 ML/MIN (ref 90–?)
GLUCOSE SERPL-MCNC: 196 MG/DL (ref 70–105)
GLUCOSE UR STRIP-MCNC: >=1000 MG/DL
HCT VFR BLD AUTO: 45.4 % (ref 42–54)
HGB UR QL STRIP: NEGATIVE
IMM GRANULOCYTES # BLD: 0.02 K/UL (ref 0–1)
INR PPP: <= 0.93 (ref 0.85–1.15)
KETONES UR STRIP-MCNC: NEGATIVE MG/DL
LEUKOCYTE ESTERASE UR QL STRIP: NEGATIVE LEU/UL
LYMPHOCYTES # SPEC AUTO: 2 K/UL (ref 1–4.8)
LYMPHOCYTES NFR SPEC AUTO: 30.8 % (ref 21–51)
MCH RBC QN AUTO: 28.2 PG (ref 27–33)
MCHC RBC AUTO-ENTMCNC: 32.8 G/DL (ref 32–36)
MCV RBC AUTO: 85.8 FL (ref 79–99)
MICRO URNS: YES
MONOCYTES # BLD AUTO: 0.6 K/UL (ref 0.1–1)
MONOCYTES NFR BLD AUTO: 10 % (ref 3–13)
NEUTROPHILS # BLD AUTO: 3.7 K/UL (ref 1.8–7.7)
NEUTROPHILS NFR BLD AUTO: 57.2 % (ref 40–77)
NITRITE UR QL STRIP: NEGATIVE
NRBC BLD MANUAL-RTO: 0 % (ref 0–0.19)
PH UR STRIP: 6.5 [PH] (ref 5–8)
PLATELET # BLD AUTO: 106 K/UL (ref 130–400)
POTASSIUM SERPL-SCNC: 4.5 MMOL/L (ref 3.5–5.1)
PROT UR QL STRIP: NEGATIVE MG/DL
PROTHROMBIN TIME: 11 SEC (ref 9.6–11.6)
RBC # BLD AUTO: 5.29 MIL/UL (ref 4.5–6.2)
RBC #/AREA URNS HPF: (no result) /HPF (ref 0–1)
RBC MORPH BLD: (no result)
SODIUM SERPL-SCNC: 140 MMOL/L (ref 136–145)
SP GR UR STRIP: 1.03 (ref 1–1.03)
UROBILINOGEN UR STRIP-MCNC: 0.2 MG/DL (ref 0.2–1)
WBC # BLD AUTO: 6.4 K/UL (ref 4.8–10.8)

## 2024-04-30 PROCEDURE — 71045 X-RAY EXAM CHEST 1 VIEW: CPT

## 2024-04-30 PROCEDURE — 36415 COLL VENOUS BLD VENIPUNCTURE: CPT

## 2024-04-30 PROCEDURE — 85730 THROMBOPLASTIN TIME PARTIAL: CPT

## 2024-04-30 PROCEDURE — 83880 ASSAY OF NATRIURETIC PEPTIDE: CPT

## 2024-04-30 PROCEDURE — 85025 COMPLETE CBC W/AUTO DIFF WBC: CPT

## 2024-04-30 PROCEDURE — 93005 ELECTROCARDIOGRAM TRACING: CPT

## 2024-04-30 PROCEDURE — 81001 URINALYSIS AUTO W/SCOPE: CPT

## 2024-04-30 PROCEDURE — 80048 BASIC METABOLIC PNL TOTAL CA: CPT

## 2024-04-30 PROCEDURE — 85610 PROTHROMBIN TIME: CPT

## 2024-05-28 VITALS — SYSTOLIC BLOOD PRESSURE: 156 MMHG | RESPIRATION RATE: 16 BRPM | HEART RATE: 106 BPM | DIASTOLIC BLOOD PRESSURE: 77 MMHG

## 2024-05-28 LAB
APPEARANCE UR: CLEAR
APTT PPP: 26.8 SEC (ref 26.3–35.5)
BASOPHILS # BLD AUTO: 0.02 K/UL (ref 0–0.2)
BASOPHILS NFR BLD AUTO: 0.3 % (ref 0–5)
BILIRUB UR QL STRIP: NEGATIVE MG/DL
BNP SERPL-MCNC: 80 PG/ML (ref 0–100)
BUN SERPL-MCNC: 16 MG/DL (ref 7–18)
CHLORIDE SERPL-SCNC: 96 MMOL/L (ref 101–111)
CO2 SERPL-SCNC: 33 MMOL/L (ref 21–32)
COLOR UR: (no result)
CREAT SERPL-MCNC: 1 MG/DL (ref 0.5–1.3)
DEPRECATED SQUAMOUS URNS QL MICRO: (no result) /HPF (ref 0–2)
EOSINOPHIL # BLD AUTO: 0.05 K/UL (ref 0–0.7)
EOSINOPHIL NFR BLD AUTO: 0.6 % (ref 0–8)
ERYTHROCYTE [DISTWIDTH] IN BLOOD BY AUTOMATED COUNT: 19.2 % (ref 11–15.5)
GFR SERPL CREATININE-BSD FRML MDRD: 85 ML/MIN (ref 90–?)
GLUCOSE SERPL-MCNC: 452 MG/DL (ref 70–105)
GLUCOSE UR STRIP-MCNC: >=1000 MG/DL
HCT VFR BLD AUTO: 50.1 % (ref 42–54)
HGB UR QL STRIP: NEGATIVE
IMM GRANULOCYTES # BLD: 0.02 K/UL (ref 0–1)
INR PPP: 0.94 (ref 0.85–1.15)
KETONES UR STRIP-MCNC: 20 MG/DL
LEUKOCYTE ESTERASE UR QL STRIP: NEGATIVE LEU/UL
LYMPHOCYTES # SPEC AUTO: 1.5 K/UL (ref 1–4.8)
LYMPHOCYTES NFR SPEC AUTO: 19.6 % (ref 21–51)
MCH RBC QN AUTO: 28.7 PG (ref 27–33)
MCHC RBC AUTO-ENTMCNC: 32.5 G/DL (ref 32–36)
MCV RBC AUTO: 88.4 FL (ref 79–99)
MICRO URNS: YES
MONOCYTES # BLD AUTO: 0.8 K/UL (ref 0.1–1)
MONOCYTES NFR BLD AUTO: 10.4 % (ref 3–13)
NEUTROPHILS # BLD AUTO: 5.4 K/UL (ref 1.8–7.7)
NEUTROPHILS NFR BLD AUTO: 68.8 % (ref 40–77)
NITRITE UR QL STRIP: NEGATIVE
NRBC BLD MANUAL-RTO: 0 % (ref 0–0.19)
PH UR STRIP: 7 [PH] (ref 5–8)
PLATELET # BLD AUTO: 102 K/UL (ref 130–400)
POTASSIUM SERPL-SCNC: 4.8 MMOL/L (ref 3.5–5.1)
PROT UR QL STRIP: NEGATIVE MG/DL
PROTHROMBIN TIME: 11.1 SEC (ref 9.6–11.6)
RBC # BLD AUTO: 5.67 MIL/UL (ref 4.5–6.2)
RBC #/AREA URNS HPF: (no result) /HPF (ref 0–1)
RBC MORPH BLD: (no result)
SODIUM SERPL-SCNC: 134 MMOL/L (ref 136–145)
SP GR UR STRIP: 1.04 (ref 1–1.03)
UROBILINOGEN UR STRIP-MCNC: 0.2 MG/DL (ref 0.2–1)
WBC # BLD AUTO: 7.9 K/UL (ref 4.8–10.8)
WBC #/AREA URNS HPF: (no result) /HPF (ref 0–1)

## 2024-05-30 ENCOUNTER — HOSPITAL ENCOUNTER (OUTPATIENT)
Dept: HOSPITAL 90 - DAH | Age: 64
Setting detail: OBSERVATION
LOS: 1 days | Discharge: HOME | End: 2024-05-31
Attending: INTERNAL MEDICINE | Admitting: INTERNAL MEDICINE
Payer: COMMERCIAL

## 2024-05-30 VITALS — SYSTOLIC BLOOD PRESSURE: 93 MMHG | DIASTOLIC BLOOD PRESSURE: 59 MMHG

## 2024-05-30 VITALS — SYSTOLIC BLOOD PRESSURE: 177 MMHG | DIASTOLIC BLOOD PRESSURE: 100 MMHG | HEART RATE: 80 BPM

## 2024-05-30 VITALS — DIASTOLIC BLOOD PRESSURE: 93 MMHG | HEART RATE: 77 BPM | SYSTOLIC BLOOD PRESSURE: 168 MMHG

## 2024-05-30 VITALS — DIASTOLIC BLOOD PRESSURE: 85 MMHG | SYSTOLIC BLOOD PRESSURE: 144 MMHG

## 2024-05-30 VITALS — RESPIRATION RATE: 18 BRPM | HEART RATE: 80 BPM | SYSTOLIC BLOOD PRESSURE: 176 MMHG | DIASTOLIC BLOOD PRESSURE: 101 MMHG

## 2024-05-30 VITALS — RESPIRATION RATE: 20 BRPM | OXYGEN SATURATION: 100 % | HEART RATE: 78 BPM

## 2024-05-30 VITALS — SYSTOLIC BLOOD PRESSURE: 91 MMHG | RESPIRATION RATE: 20 BRPM | DIASTOLIC BLOOD PRESSURE: 34 MMHG | HEART RATE: 67 BPM

## 2024-05-30 VITALS — DIASTOLIC BLOOD PRESSURE: 85 MMHG | SYSTOLIC BLOOD PRESSURE: 144 MMHG | HEART RATE: 74 BPM

## 2024-05-30 VITALS — SYSTOLIC BLOOD PRESSURE: 106 MMHG | HEART RATE: 72 BPM | RESPIRATION RATE: 20 BRPM | DIASTOLIC BLOOD PRESSURE: 69 MMHG

## 2024-05-30 VITALS — OXYGEN SATURATION: 99 %

## 2024-05-30 VITALS — SYSTOLIC BLOOD PRESSURE: 96 MMHG | DIASTOLIC BLOOD PRESSURE: 72 MMHG | RESPIRATION RATE: 16 BRPM | HEART RATE: 88 BPM

## 2024-05-30 VITALS — HEART RATE: 80 BPM | SYSTOLIC BLOOD PRESSURE: 179 MMHG | DIASTOLIC BLOOD PRESSURE: 110 MMHG

## 2024-05-30 VITALS — WEIGHT: 154.2 LBS | BODY MASS INDEX: 22.84 KG/M2 | HEIGHT: 69 IN
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
## 2024-05-30 DIAGNOSIS — I25.10: ICD-10-CM
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
## 2024-05-30 DIAGNOSIS — I27.20: ICD-10-CM

## 2024-05-30 DIAGNOSIS — I50.32: ICD-10-CM
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
## 2024-05-30 DIAGNOSIS — I25.5: ICD-10-CM

## 2024-05-30 DIAGNOSIS — Z95.1: ICD-10-CM

## 2024-05-30 DIAGNOSIS — R29.700: ICD-10-CM

## 2024-05-30 DIAGNOSIS — Z86.73: ICD-10-CM

## 2024-05-30 DIAGNOSIS — I73.89: Primary | ICD-10-CM

## 2024-05-30 DIAGNOSIS — E87.1: ICD-10-CM

## 2024-05-30 DIAGNOSIS — I35.0: ICD-10-CM

## 2024-05-30 DIAGNOSIS — E78.5: ICD-10-CM

## 2024-05-30 DIAGNOSIS — I11.0: ICD-10-CM

## 2024-05-30 PROCEDURE — 85610 PROTHROMBIN TIME: CPT

## 2024-05-30 PROCEDURE — C1887 CATHETER, GUIDING: HCPCS

## 2024-05-30 PROCEDURE — 97530 THERAPEUTIC ACTIVITIES: CPT

## 2024-05-30 PROCEDURE — 97161 PT EVAL LOW COMPLEX 20 MIN: CPT

## 2024-05-30 PROCEDURE — 36415 COLL VENOUS BLD VENIPUNCTURE: CPT

## 2024-05-30 PROCEDURE — C1725 CATH, TRANSLUMIN NON-LASER: HCPCS

## 2024-05-30 PROCEDURE — 75716 ARTERY X-RAYS ARMS/LEGS: CPT

## 2024-05-30 PROCEDURE — 82948 REAGENT STRIP/BLOOD GLUCOSE: CPT

## 2024-05-30 PROCEDURE — 96374 THER/PROPH/DIAG INJ IV PUSH: CPT

## 2024-05-30 PROCEDURE — 96375 TX/PRO/DX INJ NEW DRUG ADDON: CPT

## 2024-05-30 PROCEDURE — 85347 COAGULATION TIME ACTIVATED: CPT

## 2024-05-30 PROCEDURE — C2623 CATH, TRANSLUMIN, DRUG-COAT: HCPCS

## 2024-05-30 PROCEDURE — 85730 THROMBOPLASTIN TIME PARTIAL: CPT

## 2024-05-30 PROCEDURE — C1760 CLOSURE DEV, VASC: HCPCS

## 2024-05-30 PROCEDURE — 70450 CT HEAD/BRAIN W/O DYE: CPT

## 2024-05-30 PROCEDURE — 81001 URINALYSIS AUTO W/SCOPE: CPT

## 2024-05-30 PROCEDURE — 80053 COMPREHEN METABOLIC PANEL: CPT

## 2024-05-30 PROCEDURE — 80048 BASIC METABOLIC PNL TOTAL CA: CPT

## 2024-05-30 PROCEDURE — 99156 MOD SED OTH PHYS/QHP 5/>YRS: CPT

## 2024-05-30 PROCEDURE — 85025 COMPLETE CBC W/AUTO DIFF WBC: CPT

## 2024-05-30 PROCEDURE — 96366 THER/PROPH/DIAG IV INF ADDON: CPT

## 2024-05-30 PROCEDURE — C1894 INTRO/SHEATH, NON-LASER: HCPCS

## 2024-05-30 PROCEDURE — 71045 X-RAY EXAM CHEST 1 VIEW: CPT

## 2024-05-30 PROCEDURE — 96365 THER/PROPH/DIAG IV INF INIT: CPT

## 2024-05-30 PROCEDURE — 83880 ASSAY OF NATRIURETIC PEPTIDE: CPT

## 2024-05-30 PROCEDURE — C1769 GUIDE WIRE: HCPCS

## 2024-05-30 PROCEDURE — 99157 MOD SED OTHER PHYS/QHP EA: CPT

## 2024-05-30 PROCEDURE — 93005 ELECTROCARDIOGRAM TRACING: CPT

## 2024-05-30 PROCEDURE — 96372 THER/PROPH/DIAG INJ SC/IM: CPT

## 2024-05-30 PROCEDURE — G0378 HOSPITAL OBSERVATION PER HR: HCPCS

## 2024-05-30 PROCEDURE — 83735 ASSAY OF MAGNESIUM: CPT

## 2024-05-30 PROCEDURE — C1893 INTRO/SHEATH, FIXED,NON-PEEL: HCPCS

## 2024-05-30 RX ADMIN — CARVEDILOL SCH MG: 3.12 TABLET, FILM COATED ORAL at 21:00

## 2024-05-30 RX ADMIN — ONDANSETRON PRN MG: 4 TABLET, ORALLY DISINTEGRATING ORAL at 18:24

## 2024-05-30 RX ADMIN — ENALAPRILAT PRN MG: 1.25 INJECTION INTRAVENOUS at 15:04

## 2024-05-30 RX ADMIN — SERTRALINE HYDROCHLORIDE SCH MG: 50 TABLET, FILM COATED ORAL at 22:00

## 2024-05-30 RX ADMIN — SODIUM CHLORIDE SCH UNIT: 9 INJECTION, SOLUTION INTRAVENOUS at 16:30

## 2024-05-30 RX ADMIN — INSULIN LISPRO SCH UNIT: 100 INJECTION, SOLUTION INTRAVENOUS; SUBCUTANEOUS at 18:20

## 2024-05-30 RX ADMIN — PROCHLORPERAZINE EDISYLATE PRN MG: 5 INJECTION INTRAMUSCULAR; INTRAVENOUS at 15:41

## 2024-05-30 RX ADMIN — ATORVASTATIN CALCIUM SCH MG: 40 TABLET, FILM COATED ORAL at 22:00

## 2024-05-31 VITALS — OXYGEN SATURATION: 93 % | RESPIRATION RATE: 20 BRPM | HEART RATE: 76 BPM

## 2024-05-31 VITALS — DIASTOLIC BLOOD PRESSURE: 81 MMHG | RESPIRATION RATE: 19 BRPM | HEART RATE: 97 BPM | SYSTOLIC BLOOD PRESSURE: 131 MMHG

## 2024-05-31 VITALS — SYSTOLIC BLOOD PRESSURE: 119 MMHG | HEART RATE: 99 BPM | RESPIRATION RATE: 18 BRPM | DIASTOLIC BLOOD PRESSURE: 66 MMHG

## 2024-05-31 VITALS — RESPIRATION RATE: 20 BRPM | SYSTOLIC BLOOD PRESSURE: 162 MMHG | HEART RATE: 95 BPM | DIASTOLIC BLOOD PRESSURE: 46 MMHG

## 2024-05-31 VITALS — OXYGEN SATURATION: 99 %

## 2024-05-31 VITALS — SYSTOLIC BLOOD PRESSURE: 100 MMHG

## 2024-05-31 LAB
ALBUMIN SERPL-MCNC: 3.3 G/DL (ref 3.5–5)
ALT SERPL-CCNC: 20 U/L (ref 12–78)
AST SERPL-CCNC: 16 U/L (ref 10–37)
BASOPHILS # BLD AUTO: 0.01 K/UL (ref 0–0.2)
BASOPHILS NFR BLD AUTO: 0.1 % (ref 0–5)
BILIRUB SERPL-MCNC: 1.1 MG/DL (ref 0.2–1)
BNP SERPL-MCNC: 210 PG/ML (ref 0–100)
BUN SERPL-MCNC: 14 MG/DL (ref 7–18)
CHLORIDE SERPL-SCNC: 101 MMOL/L (ref 101–111)
CO2 SERPL-SCNC: 25 MMOL/L (ref 21–32)
CREAT SERPL-MCNC: 0.9 MG/DL (ref 0.5–1.3)
EOSINOPHIL # BLD AUTO: 0 K/UL (ref 0–0.7)
EOSINOPHIL NFR BLD AUTO: 0 % (ref 0–8)
ERYTHROCYTE [DISTWIDTH] IN BLOOD BY AUTOMATED COUNT: 18.6 % (ref 11–15.5)
GFR SERPL CREATININE-BSD FRML MDRD: 97 ML/MIN (ref 90–?)
GLUCOSE SERPL-MCNC: 110 MG/DL (ref 70–105)
HCT VFR BLD AUTO: 43.2 % (ref 42–54)
IMM GRANULOCYTES # BLD: 0.05 K/UL (ref 0–1)
LYMPHOCYTES # SPEC AUTO: 0.9 K/UL (ref 1–4.8)
LYMPHOCYTES NFR SPEC AUTO: 9.2 % (ref 21–51)
MAGNESIUM SERPL-MCNC: 1.9 MG/DL (ref 1.8–2.4)
MCH RBC QN AUTO: 29.3 PG (ref 27–33)
MCHC RBC AUTO-ENTMCNC: 33.6 G/DL (ref 32–36)
MCV RBC AUTO: 87.3 FL (ref 79–99)
MONOCYTES # BLD AUTO: 0.7 K/UL (ref 0.1–1)
MONOCYTES NFR BLD AUTO: 7.2 % (ref 3–13)
NEUTROPHILS # BLD AUTO: 8 K/UL (ref 1.8–7.7)
NEUTROPHILS NFR BLD AUTO: 83 % (ref 40–77)
NRBC BLD MANUAL-RTO: 0 % (ref 0–0.19)
PLATELET # BLD AUTO: 85 K/UL (ref 130–400)
POTASSIUM SERPL-SCNC: 4.3 MMOL/L (ref 3.5–5.1)
PROT SERPL-MCNC: 7 G/DL (ref 6–8.3)
RBC # BLD AUTO: 4.95 MIL/UL (ref 4.5–6.2)
SODIUM SERPL-SCNC: 138 MMOL/L (ref 136–145)
WBC # BLD AUTO: 9.7 K/UL (ref 4.8–10.8)
WBC MORPH BLD: (no result)

## 2024-05-31 RX ADMIN — PANTOPRAZOLE SODIUM SCH MG: 40 TABLET, DELAYED RELEASE ORAL at 10:35

## 2024-05-31 RX ADMIN — BUPROPION HYDROCHLORIDE SCH MG: 150 TABLET, FILM COATED ORAL at 10:36

## 2024-05-31 RX ADMIN — CALCIUM CARBONATE PRN TAB: 500 TABLET ORAL at 00:41

## 2024-05-31 RX ADMIN — CLOPIDOGREL BISULFATE SCH MG: 75 TABLET, FILM COATED ORAL at 10:36

## 2024-05-31 RX ADMIN — THERA TABS SCH TAB: TAB at 10:36

## 2024-05-31 RX ADMIN — INSULIN GLARGINE SCH UNITS: 100 INJECTION, SOLUTION SUBCUTANEOUS at 10:35

## 2024-05-31 RX ADMIN — PANTOPRAZOLE SODIUM PRN MG: 40 TABLET, DELAYED RELEASE ORAL at 00:04

## 2024-05-31 RX ADMIN — MAGNESIUM SULFATE IN WATER PRN MLS/HR: 40 INJECTION, SOLUTION INTRAVENOUS at 06:30

## 2024-05-31 RX ADMIN — ASPIRIN SCH MG: 81 TABLET, CHEWABLE ORAL at 10:36

## 2024-10-24 ENCOUNTER — HOSPITAL ENCOUNTER (INPATIENT)
Dept: HOSPITAL 90 - EDH | Age: 64
LOS: 2 days | Discharge: HOME | DRG: 279 | End: 2024-10-26
Attending: INTERNAL MEDICINE | Admitting: INTERNAL MEDICINE
Payer: COMMERCIAL

## 2024-10-24 VITALS
DIASTOLIC BLOOD PRESSURE: 88 MMHG | RESPIRATION RATE: 18 BRPM | SYSTOLIC BLOOD PRESSURE: 145 MMHG | HEART RATE: 96 BPM | TEMPERATURE: 98.2 F

## 2024-10-24 VITALS — WEIGHT: 162.3 LBS | HEIGHT: 69 IN | BODY MASS INDEX: 24.04 KG/M2

## 2024-10-24 DIAGNOSIS — N18.2: ICD-10-CM

## 2024-10-24 DIAGNOSIS — E11.51: Primary | ICD-10-CM

## 2024-10-24 DIAGNOSIS — Z79.899: ICD-10-CM

## 2024-10-24 DIAGNOSIS — Z79.82: ICD-10-CM

## 2024-10-24 DIAGNOSIS — E78.00: ICD-10-CM

## 2024-10-24 DIAGNOSIS — I35.0: ICD-10-CM

## 2024-10-24 DIAGNOSIS — Z95.1: ICD-10-CM

## 2024-10-24 DIAGNOSIS — E11.22: ICD-10-CM

## 2024-10-24 DIAGNOSIS — I42.9: ICD-10-CM

## 2024-10-24 DIAGNOSIS — Z86.73: ICD-10-CM

## 2024-10-24 DIAGNOSIS — I27.20: ICD-10-CM

## 2024-10-24 DIAGNOSIS — Z95.5: ICD-10-CM

## 2024-10-24 DIAGNOSIS — I13.0: ICD-10-CM

## 2024-10-24 DIAGNOSIS — Z88.8: ICD-10-CM

## 2024-10-24 DIAGNOSIS — Z88.5: ICD-10-CM

## 2024-10-24 DIAGNOSIS — I50.22: ICD-10-CM

## 2024-10-24 DIAGNOSIS — L03.116: ICD-10-CM

## 2024-10-24 DIAGNOSIS — I25.10: ICD-10-CM

## 2024-10-24 DIAGNOSIS — E11.65: ICD-10-CM

## 2024-10-24 DIAGNOSIS — E87.1: ICD-10-CM

## 2024-10-24 LAB
APTT PPP: 26.5 SEC (ref 26.3–35.5)
BASOPHILS # BLD AUTO: 0.02 K/UL (ref 0–0.2)
BASOPHILS NFR BLD AUTO: 0.3 % (ref 0–5)
BUN SERPL-MCNC: 20 MG/DL (ref 7–18)
CHLORIDE SERPL-SCNC: 97 MMOL/L (ref 101–111)
CO2 SERPL-SCNC: 32 MMOL/L (ref 21–32)
CREAT SERPL-MCNC: 1.1 MG/DL (ref 0.5–1.3)
EOSINOPHIL # BLD AUTO: 0.09 K/UL (ref 0–0.7)
EOSINOPHIL NFR BLD AUTO: 1.2 % (ref 0–8)
ERYTHROCYTE [DISTWIDTH] IN BLOOD BY AUTOMATED COUNT: 17.4 % (ref 11–15.5)
GFR SERPL CREATININE-BSD FRML MDRD: 75 ML/MIN (ref 90–?)
GLUCOSE SERPL-MCNC: 235 MG/DL (ref 70–105)
HCT VFR BLD AUTO: 44.1 % (ref 42–54)
IMM GRANULOCYTES # BLD: 0.03 K/UL (ref 0–1)
INR PPP: 1.02 (ref 0.85–1.15)
LYMPHOCYTES # SPEC AUTO: 2 K/UL (ref 1–4.8)
LYMPHOCYTES NFR SPEC AUTO: 26.4 % (ref 21–51)
MCH RBC QN AUTO: 31.4 PG (ref 27–33)
MCHC RBC AUTO-ENTMCNC: 33.3 G/DL (ref 32–36)
MCV RBC AUTO: 94.2 FL (ref 79–99)
MONOCYTES # BLD AUTO: 0.7 K/UL (ref 0.1–1)
MONOCYTES NFR BLD AUTO: 9.6 % (ref 3–13)
NEUTROPHILS # BLD AUTO: 4.7 K/UL (ref 1.8–7.7)
NEUTROPHILS NFR BLD AUTO: 62.1 % (ref 40–77)
NRBC BLD MANUAL-RTO: 0 % (ref 0–0.19)
PLATELET # BLD AUTO: 147 K/UL (ref 130–400)
POTASSIUM SERPL-SCNC: 4.1 MMOL/L (ref 3.5–5.1)
PROTHROMBIN TIME: 11 SEC (ref 9.6–11.6)
RBC # BLD AUTO: 4.68 MIL/UL (ref 4.5–6.2)
SODIUM SERPL-SCNC: 134 MMOL/L (ref 136–145)
WBC # BLD AUTO: 7.5 K/UL (ref 4.8–10.8)

## 2024-10-24 PROCEDURE — C1725 CATH, TRANSLUMIN NON-LASER: HCPCS

## 2024-10-24 PROCEDURE — 84443 ASSAY THYROID STIM HORMONE: CPT

## 2024-10-24 PROCEDURE — 85025 COMPLETE CBC W/AUTO DIFF WBC: CPT

## 2024-10-24 PROCEDURE — 85610 PROTHROMBIN TIME: CPT

## 2024-10-24 PROCEDURE — C1894 INTRO/SHEATH, NON-LASER: HCPCS

## 2024-10-24 PROCEDURE — 96365 THER/PROPH/DIAG IV INF INIT: CPT

## 2024-10-24 PROCEDURE — 85730 THROMBOPLASTIN TIME PARTIAL: CPT

## 2024-10-24 PROCEDURE — 73630 X-RAY EXAM OF FOOT: CPT

## 2024-10-24 PROCEDURE — 75716 ARTERY X-RAYS ARMS/LEGS: CPT

## 2024-10-24 PROCEDURE — 99157 MOD SED OTHER PHYS/QHP EA: CPT

## 2024-10-24 PROCEDURE — 99156 MOD SED OTH PHYS/QHP 5/>YRS: CPT

## 2024-10-24 PROCEDURE — C1769 GUIDE WIRE: HCPCS

## 2024-10-24 PROCEDURE — 87040 BLOOD CULTURE FOR BACTERIA: CPT

## 2024-10-24 PROCEDURE — 75774 ARTERY X-RAY EACH VESSEL: CPT

## 2024-10-24 PROCEDURE — 80048 BASIC METABOLIC PNL TOTAL CA: CPT

## 2024-10-24 PROCEDURE — 85027 COMPLETE CBC AUTOMATED: CPT

## 2024-10-24 PROCEDURE — 83605 ASSAY OF LACTIC ACID: CPT

## 2024-10-24 PROCEDURE — 80053 COMPREHEN METABOLIC PANEL: CPT

## 2024-10-24 PROCEDURE — C2623 CATH, TRANSLUMIN, DRUG-COAT: HCPCS

## 2024-10-24 PROCEDURE — C1893 INTRO/SHEATH, FIXED,NON-PEEL: HCPCS

## 2024-10-24 PROCEDURE — 84100 ASSAY OF PHOSPHORUS: CPT

## 2024-10-24 PROCEDURE — 82948 REAGENT STRIP/BLOOD GLUCOSE: CPT

## 2024-10-24 PROCEDURE — 36415 COLL VENOUS BLD VENIPUNCTURE: CPT

## 2024-10-24 PROCEDURE — 83735 ASSAY OF MAGNESIUM: CPT

## 2024-10-24 PROCEDURE — C1887 CATHETER, GUIDING: HCPCS

## 2024-10-24 PROCEDURE — 75625 CONTRAST EXAM ABDOMINL AORTA: CPT

## 2024-10-24 PROCEDURE — 93926 LOWER EXTREMITY STUDY: CPT

## 2024-10-24 RX ADMIN — CEFTRIAXONE SODIUM ONE GM: 2 INJECTION, POWDER, FOR SOLUTION INTRAMUSCULAR; INTRAVENOUS at 21:57

## 2024-10-24 RX ADMIN — SODIUM CHLORIDE ONE MLS/HR: 0.9 INJECTION, SOLUTION INTRAVENOUS at 21:57

## 2024-10-25 VITALS — DIASTOLIC BLOOD PRESSURE: 93 MMHG | HEART RATE: 101 BPM | SYSTOLIC BLOOD PRESSURE: 150 MMHG

## 2024-10-25 VITALS
TEMPERATURE: 98.1 F | RESPIRATION RATE: 16 BRPM | SYSTOLIC BLOOD PRESSURE: 122 MMHG | DIASTOLIC BLOOD PRESSURE: 73 MMHG | HEART RATE: 103 BPM

## 2024-10-25 VITALS — SYSTOLIC BLOOD PRESSURE: 150 MMHG | HEART RATE: 101 BPM | DIASTOLIC BLOOD PRESSURE: 89 MMHG

## 2024-10-25 VITALS
SYSTOLIC BLOOD PRESSURE: 127 MMHG | HEART RATE: 105 BPM | TEMPERATURE: 98.1 F | RESPIRATION RATE: 18 BRPM | DIASTOLIC BLOOD PRESSURE: 74 MMHG

## 2024-10-25 VITALS
DIASTOLIC BLOOD PRESSURE: 96 MMHG | TEMPERATURE: 98.7 F | RESPIRATION RATE: 17 BRPM | HEART RATE: 104 BPM | SYSTOLIC BLOOD PRESSURE: 138 MMHG

## 2024-10-25 VITALS — DIASTOLIC BLOOD PRESSURE: 90 MMHG | SYSTOLIC BLOOD PRESSURE: 138 MMHG | HEART RATE: 101 BPM

## 2024-10-25 VITALS
RESPIRATION RATE: 20 BRPM | DIASTOLIC BLOOD PRESSURE: 90 MMHG | OXYGEN SATURATION: 100 % | SYSTOLIC BLOOD PRESSURE: 161 MMHG | TEMPERATURE: 97.6 F | HEART RATE: 102 BPM

## 2024-10-25 VITALS
HEART RATE: 105 BPM | TEMPERATURE: 97.5 F | SYSTOLIC BLOOD PRESSURE: 133 MMHG | RESPIRATION RATE: 18 BRPM | DIASTOLIC BLOOD PRESSURE: 82 MMHG

## 2024-10-25 VITALS — OXYGEN SATURATION: 100 %

## 2024-10-25 VITALS
DIASTOLIC BLOOD PRESSURE: 54 MMHG | SYSTOLIC BLOOD PRESSURE: 100 MMHG | HEART RATE: 58 BPM | TEMPERATURE: 98.1 F | RESPIRATION RATE: 18 BRPM

## 2024-10-25 VITALS — SYSTOLIC BLOOD PRESSURE: 146 MMHG | DIASTOLIC BLOOD PRESSURE: 82 MMHG | HEART RATE: 99 BPM

## 2024-10-25 VITALS — DIASTOLIC BLOOD PRESSURE: 88 MMHG | HEART RATE: 99 BPM | SYSTOLIC BLOOD PRESSURE: 146 MMHG

## 2024-10-25 VITALS — SYSTOLIC BLOOD PRESSURE: 145 MMHG | DIASTOLIC BLOOD PRESSURE: 85 MMHG | HEART RATE: 100 BPM

## 2024-10-25 VITALS — DIASTOLIC BLOOD PRESSURE: 91 MMHG | HEART RATE: 106 BPM | SYSTOLIC BLOOD PRESSURE: 136 MMHG

## 2024-10-25 VITALS — HEART RATE: 104 BPM | DIASTOLIC BLOOD PRESSURE: 82 MMHG | SYSTOLIC BLOOD PRESSURE: 135 MMHG

## 2024-10-25 VITALS — OXYGEN SATURATION: 95 %

## 2024-10-25 LAB
BASOPHILS # BLD AUTO: 0.01 K/UL (ref 0–0.2)
BASOPHILS NFR BLD AUTO: 0.2 % (ref 0–5)
BUN SERPL-MCNC: 15 MG/DL (ref 7–18)
CHLORIDE SERPL-SCNC: 100 MMOL/L (ref 101–111)
CO2 SERPL-SCNC: 32 MMOL/L (ref 21–32)
CREAT SERPL-MCNC: 1 MG/DL (ref 0.5–1.3)
EOSINOPHIL # BLD AUTO: 0.07 K/UL (ref 0–0.7)
EOSINOPHIL NFR BLD AUTO: 1.4 % (ref 0–8)
ERYTHROCYTE [DISTWIDTH] IN BLOOD BY AUTOMATED COUNT: 17.2 % (ref 11–15.5)
GFR SERPL CREATININE-BSD FRML MDRD: 85 ML/MIN (ref 90–?)
GLUCOSE SERPL-MCNC: 158 MG/DL (ref 70–105)
HCT VFR BLD AUTO: 43.3 % (ref 42–54)
IMM GRANULOCYTES # BLD: 0.03 K/UL (ref 0–1)
LYMPHOCYTES # SPEC AUTO: 1 K/UL (ref 1–4.8)
LYMPHOCYTES NFR SPEC AUTO: 19.3 % (ref 21–51)
MAGNESIUM SERPL-MCNC: 2 MG/DL (ref 1.8–2.4)
MCH RBC QN AUTO: 31.2 PG (ref 27–33)
MCHC RBC AUTO-ENTMCNC: 33 G/DL (ref 32–36)
MCV RBC AUTO: 94.3 FL (ref 79–99)
MONOCYTES # BLD AUTO: 0.1 K/UL (ref 0.1–1)
MONOCYTES NFR BLD AUTO: 1.6 % (ref 3–13)
NEUTROPHILS # BLD AUTO: 3.8 K/UL (ref 1.8–7.7)
NEUTROPHILS NFR BLD AUTO: 76.9 % (ref 40–77)
NRBC BLD MANUAL-RTO: 0 % (ref 0–0.19)
PHOSPHATE SERPL-MCNC: 3.9 MG/DL (ref 2.5–4.9)
PLATELET # BLD AUTO: 131 K/UL (ref 130–400)
POTASSIUM SERPL-SCNC: 3.7 MMOL/L (ref 3.5–5.1)
RBC # BLD AUTO: 4.59 MIL/UL (ref 4.5–6.2)
SODIUM SERPL-SCNC: 138 MMOL/L (ref 136–145)
TSH SERPL DL<=0.05 MIU/L-ACNC: 1.22 UIU/ML (ref 0.36–3.74)
WBC # BLD AUTO: 4.9 K/UL (ref 4.8–10.8)

## 2024-10-25 PROCEDURE — 047N3Z1 DILATION OF LEFT POPLITEAL ARTERY USING DRUG-COATED BALLOON, PERCUTANEOUS APPROACH: ICD-10-PCS | Performed by: INTERNAL MEDICINE

## 2024-10-25 PROCEDURE — B41G1ZZ FLUOROSCOPY OF LEFT LOWER EXTREMITY ARTERIES USING LOW OSMOLAR CONTRAST: ICD-10-PCS | Performed by: INTERNAL MEDICINE

## 2024-10-25 PROCEDURE — B4101ZZ FLUOROSCOPY OF ABDOMINAL AORTA USING LOW OSMOLAR CONTRAST: ICD-10-PCS | Performed by: INTERNAL MEDICINE

## 2024-10-25 PROCEDURE — 04FN3ZZ FRAGMENTATION OF LEFT POPLITEAL ARTERY, PERCUTANEOUS APPROACH: ICD-10-PCS | Performed by: INTERNAL MEDICINE

## 2024-10-25 RX ADMIN — FAMOTIDINE SCH MG: 10 INJECTION INTRAVENOUS at 09:00

## 2024-10-25 RX ADMIN — VANCOMYCIN ONE MLS/HR: 1.25 INJECTION, SOLUTION INTRAVENOUS at 08:00

## 2024-10-25 RX ADMIN — SUCRALFATE SCH GM: 1 TABLET ORAL at 12:35

## 2024-10-25 RX ADMIN — ASPIRIN SCH MG: 81 TABLET, CHEWABLE ORAL at 11:00

## 2024-10-25 RX ADMIN — SODIUM CHLORIDE SCH MLS/HR: 0.9 INJECTION, SOLUTION INTRAVENOUS at 16:10

## 2024-10-25 RX ADMIN — VANCOMYCIN HYDROCHLORIDE SCH MLS/HR: 1 INJECTION, POWDER, LYOPHILIZED, FOR SOLUTION INTRAVENOUS at 20:23

## 2024-10-26 VITALS
HEART RATE: 98 BPM | TEMPERATURE: 97.7 F | SYSTOLIC BLOOD PRESSURE: 121 MMHG | RESPIRATION RATE: 16 BRPM | DIASTOLIC BLOOD PRESSURE: 70 MMHG

## 2024-10-26 VITALS
RESPIRATION RATE: 16 BRPM | TEMPERATURE: 98 F | DIASTOLIC BLOOD PRESSURE: 68 MMHG | HEART RATE: 90 BPM | SYSTOLIC BLOOD PRESSURE: 118 MMHG

## 2024-10-26 VITALS
SYSTOLIC BLOOD PRESSURE: 107 MMHG | TEMPERATURE: 97.8 F | DIASTOLIC BLOOD PRESSURE: 62 MMHG | RESPIRATION RATE: 16 BRPM | HEART RATE: 91 BPM

## 2024-10-26 VITALS
HEART RATE: 96 BPM | SYSTOLIC BLOOD PRESSURE: 101 MMHG | TEMPERATURE: 98.1 F | DIASTOLIC BLOOD PRESSURE: 68 MMHG | RESPIRATION RATE: 18 BRPM

## 2024-10-26 VITALS — OXYGEN SATURATION: 95 %

## 2024-10-26 LAB
ALBUMIN SERPL-MCNC: 2.9 G/DL (ref 3.5–5)
ALT SERPL-CCNC: 16 U/L (ref 12–78)
APTT PPP: 78.8 SEC (ref 26.3–35.5)
AST SERPL-CCNC: 14 U/L (ref 10–37)
BILIRUB SERPL-MCNC: 0.5 MG/DL (ref 0.2–1)
BUN SERPL-MCNC: 21 MG/DL (ref 7–18)
CELLS COUNTED: 100
CHLORIDE SERPL-SCNC: 105 MMOL/L (ref 101–111)
CO2 SERPL-SCNC: 26 MMOL/L (ref 21–32)
CREAT SERPL-MCNC: 0.8 MG/DL (ref 0.5–1.3)
EOSINOPHIL NFR BLD MANUAL: 2 % (ref 1–6)
ERYTHROCYTE [DISTWIDTH] IN BLOOD BY AUTOMATED COUNT: 17.1 % (ref 11–15.5)
ERYTHROCYTE [DISTWIDTH] IN BLOOD BY AUTOMATED COUNT: 17.3 % (ref 11–15.5)
GFR SERPL CREATININE-BSD FRML MDRD: 99 ML/MIN (ref 90–?)
GLUCOSE SERPL-MCNC: 121 MG/DL (ref 70–105)
HCT VFR BLD AUTO: 35.7 % (ref 42–54)
HCT VFR BLD AUTO: 37 % (ref 42–54)
INR PPP: 1.08 (ref 0.85–1.15)
LYMPHOCYTES NFR BLD MANUAL: 23 % (ref 22–44)
MAGNESIUM SERPL-MCNC: 1.9 MG/DL (ref 1.8–2.4)
MANUAL DIF COMMENT BLD-IMP: (no result)
MCH RBC QN AUTO: 30.5 PG (ref 27–33)
MCH RBC QN AUTO: 30.7 PG (ref 27–33)
MCHC RBC AUTO-ENTMCNC: 33.2 G/DL (ref 32–36)
MCHC RBC AUTO-ENTMCNC: 33.3 G/DL (ref 32–36)
MCV RBC AUTO: 91.5 FL (ref 79–99)
MCV RBC AUTO: 92.3 FL (ref 79–99)
MONOCYTES NFR BLD MANUAL: 8 % (ref 2–9)
NEUTS BAND NFR BLD MANUAL: 5 % (ref 0–2)
NEUTS SEG NFR BLD MANUAL: 62 % (ref 40–70)
NRBC BLD MANUAL-RTO: 0 % (ref 0–0.19)
NRBC BLD MANUAL-RTO: 0 % (ref 0–0.19)
PLAT MORPH BLD: (no result)
PLATELET # BLD AUTO: 104 K/UL (ref 130–400)
PLATELET # BLD AUTO: 109 K/UL (ref 130–400)
POTASSIUM SERPL-SCNC: 4 MMOL/L (ref 3.5–5.1)
PROT SERPL-MCNC: 6.4 G/DL (ref 6–8.3)
PROTHROMBIN TIME: 11.6 SEC (ref 9.6–11.6)
RBC # BLD AUTO: 3.9 MIL/UL (ref 4.5–6.2)
RBC # BLD AUTO: 4.01 MIL/UL (ref 4.5–6.2)
RBC MORPH BLD: (no result)
SODIUM SERPL-SCNC: 138 MMOL/L (ref 136–145)
WBC # BLD AUTO: 4.7 K/UL (ref 4.8–10.8)
WBC # BLD AUTO: 5 K/UL (ref 4.8–10.8)
WBC MORPH BLD: (no result)

## 2024-10-26 RX ADMIN — FERROUS SULFATE TAB EC 324 MG (65 MG FE EQUIVALENT) SCH MG: 324 (65 FE) TABLET DELAYED RESPONSE at 09:16

## 2024-11-21 ENCOUNTER — HOSPITAL ENCOUNTER (OUTPATIENT)
Dept: HOSPITAL 90 - LAB | Age: 64
Discharge: HOME | End: 2024-11-21
Attending: INTERNAL MEDICINE
Payer: COMMERCIAL

## 2024-11-21 DIAGNOSIS — I73.9: ICD-10-CM

## 2024-11-21 DIAGNOSIS — I50.22: Primary | ICD-10-CM

## 2024-11-21 DIAGNOSIS — I35.2: ICD-10-CM

## 2024-11-21 LAB
BUN SERPL-MCNC: 24 MG/DL (ref 7–18)
CHLORIDE SERPL-SCNC: 97 MMOL/L (ref 101–111)
CO2 SERPL-SCNC: 23 MMOL/L (ref 21–32)
CREAT SERPL-MCNC: 1.3 MG/DL (ref 0.5–1.3)
GFR SERPL CREATININE-BSD FRML MDRD: 62 ML/MIN (ref 90–?)
GLUCOSE SERPL-MCNC: 484 MG/DL (ref 70–105)
POTASSIUM SERPL-SCNC: 5.1 MMOL/L (ref 3.5–5.1)
SODIUM SERPL-SCNC: 135 MMOL/L (ref 136–145)

## 2024-11-21 PROCEDURE — 83880 ASSAY OF NATRIURETIC PEPTIDE: CPT

## 2024-11-21 PROCEDURE — 80048 BASIC METABOLIC PNL TOTAL CA: CPT

## 2024-11-21 PROCEDURE — 36415 COLL VENOUS BLD VENIPUNCTURE: CPT
